# Patient Record
Sex: FEMALE | Race: WHITE | NOT HISPANIC OR LATINO | Employment: OTHER | URBAN - METROPOLITAN AREA
[De-identification: names, ages, dates, MRNs, and addresses within clinical notes are randomized per-mention and may not be internally consistent; named-entity substitution may affect disease eponyms.]

---

## 2023-11-01 ENCOUNTER — OFFICE VISIT (OUTPATIENT)
Dept: PRIMARY CARE | Facility: CLINIC | Age: 68
End: 2023-11-01
Payer: COMMERCIAL

## 2023-11-01 VITALS
WEIGHT: 215.39 LBS | OXYGEN SATURATION: 94 % | RESPIRATION RATE: 16 BRPM | SYSTOLIC BLOOD PRESSURE: 130 MMHG | DIASTOLIC BLOOD PRESSURE: 80 MMHG | BODY MASS INDEX: 35.89 KG/M2 | HEIGHT: 65 IN | HEART RATE: 79 BPM

## 2023-11-01 DIAGNOSIS — R26.9 GAIT ABNORMALITY: Primary | ICD-10-CM

## 2023-11-01 DIAGNOSIS — F41.9 ANXIETY: ICD-10-CM

## 2023-11-01 DIAGNOSIS — M16.10 HIP ARTHRITIS: ICD-10-CM

## 2023-11-01 DIAGNOSIS — B19.20 HEPATITIS C VIRUS INFECTION WITHOUT HEPATIC COMA, UNSPECIFIED CHRONICITY: ICD-10-CM

## 2023-11-01 PROBLEM — Z96.652 HISTORY OF TOTAL LEFT KNEE REPLACEMENT: Status: ACTIVE | Noted: 2023-11-01

## 2023-11-01 PROBLEM — F41.0 PANIC DISORDER: Status: ACTIVE | Noted: 2023-11-01

## 2023-11-01 PROBLEM — A69.20 LYME DISEASE: Status: ACTIVE | Noted: 2023-11-01

## 2023-11-01 PROBLEM — Z96.643 HISTORY OF TOTAL REPLACEMENT OF BOTH HIP JOINTS: Status: ACTIVE | Noted: 2023-11-01

## 2023-11-01 PROBLEM — M53.86 SCIATICA ASSOCIATED WITH DISORDER OF LUMBAR SPINE: Status: ACTIVE | Noted: 2023-11-01

## 2023-11-01 PROBLEM — Z86.74 HISTORY OF CARDIAC ARREST: Status: ACTIVE | Noted: 2023-11-01

## 2023-11-01 PROBLEM — B18.2 CHRONIC HEPATITIS C WITHOUT HEPATIC COMA (MULTI): Status: ACTIVE | Noted: 2023-11-01

## 2023-11-01 PROBLEM — M54.16 LUMBAR RADICULOPATHY, CHRONIC: Status: ACTIVE | Noted: 2023-11-01

## 2023-11-01 PROBLEM — E89.0 POSTOPERATIVE HYPOTHYROIDISM: Status: ACTIVE | Noted: 2023-11-01

## 2023-11-01 PROBLEM — M48.061 LUMBAR STENOSIS: Status: ACTIVE | Noted: 2023-11-01

## 2023-11-01 PROBLEM — I10 PRIMARY HYPERTENSION: Status: ACTIVE | Noted: 2023-11-01

## 2023-11-01 PROBLEM — Z86.2 HISTORY OF THROMBOCYTOPENIA: Status: ACTIVE | Noted: 2023-11-01

## 2023-11-01 PROBLEM — Z96.649 HISTORY OF REVISION OF TOTAL HIP ARTHROPLASTY: Status: ACTIVE | Noted: 2023-11-01

## 2023-11-01 PROCEDURE — 3075F SYST BP GE 130 - 139MM HG: CPT | Performed by: INTERNAL MEDICINE

## 2023-11-01 PROCEDURE — 99204 OFFICE O/P NEW MOD 45 MIN: CPT | Performed by: INTERNAL MEDICINE

## 2023-11-01 PROCEDURE — 3079F DIAST BP 80-89 MM HG: CPT | Performed by: INTERNAL MEDICINE

## 2023-11-01 PROCEDURE — 1159F MED LIST DOCD IN RCRD: CPT | Performed by: INTERNAL MEDICINE

## 2023-11-01 RX ORDER — FLUOCINONIDE 0.5 MG/G
CREAM TOPICAL 2 TIMES DAILY
COMMUNITY
Start: 2023-01-20 | End: 2023-11-01 | Stop reason: ALTCHOICE

## 2023-11-01 RX ORDER — GABAPENTIN 400 MG/1
800 CAPSULE ORAL 3 TIMES DAILY
COMMUNITY
Start: 2023-10-24

## 2023-11-01 RX ORDER — ASPIRIN 81 MG/1
81 TABLET ORAL 2 TIMES DAILY
COMMUNITY
Start: 2020-12-15

## 2023-11-01 RX ORDER — CLINDAMYCIN PHOSPHATE 11.9 MG/ML
SOLUTION TOPICAL
COMMUNITY
Start: 2016-02-03 | End: 2023-11-01 | Stop reason: ALTCHOICE

## 2023-11-01 RX ORDER — ALPRAZOLAM 0.5 MG/1
0.5 TABLET ORAL 2 TIMES DAILY PRN
COMMUNITY
Start: 2023-10-24

## 2023-11-01 RX ORDER — LEVOTHYROXINE SODIUM 137 UG/1
137 TABLET ORAL DAILY
COMMUNITY
Start: 2023-08-11

## 2023-11-01 RX ORDER — ERGOCALCIFEROL 1.25 MG/1
100000 CAPSULE ORAL WEEKLY
COMMUNITY
Start: 2020-12-15

## 2023-11-01 RX ORDER — LISINOPRIL AND HYDROCHLOROTHIAZIDE 12.5; 2 MG/1; MG/1
1 TABLET ORAL DAILY
COMMUNITY
Start: 2023-06-20

## 2023-11-01 RX ORDER — MUPIROCIN 20 MG/G
OINTMENT TOPICAL
COMMUNITY
Start: 2023-01-05 | End: 2023-11-01 | Stop reason: ALTCHOICE

## 2023-11-01 ASSESSMENT — ENCOUNTER SYMPTOMS
LOSS OF SENSATION IN FEET: 0
OCCASIONAL FEELINGS OF UNSTEADINESS: 1
DEPRESSION: 0

## 2023-11-01 NOTE — PROGRESS NOTES
Subjective   Patient ID: Rashmi Oreilly is a 68 y.o. female who presents to [facility]. She is switching care from [facility]  She is mostly here because she was told that [facility] has very good physical therapy.  She has a longstanding relationship with her PCP at [facility].    Chief complaint: Patient had a L hip arthroplasty in 2019 with complicated recovery and subsequent revision and L TKA , and has not had full function of the leg since. She would like a referral for physical therapy.     Since the surgery, she experiences significant pain of the L leg and foot with cramping of L foot. She manages her pain with ibuprofen, 600-800 mg at a time TID. Ibuprofen does help with the pain, but she is concerned long-term effects on her liver.     Mental health: Patient has a history of panic disorder as diagnosed by a psychiatrist in the past. She manages her panic with alprazolam, which she says has significantly improved her quality of life by limiting the number of panic attacks that she experiences. Current frequency of panic attacks is every 6 weeks or so, and they generally occur at night, awakening her from sleep.         Also with reported history of hepatitis C, she says she has never received treatment for this.  She is not very interested, says she has talked to her [employer] group and she believes that unless she has actual symptoms does any treatment.      PMH  - Panic disorder, with frequent panic attacks    - Cardiac arrest with ventricular fibrillation 2/2 AICD implantation (2008); [facility] clinical summary note generated 11/1/23 notes AICD may no longer be functional due to fractured lead   - Chronic HCV  - Hypertension - patient does not check her BP at home  - Hypothyroidism 2/2 thyroidectomy for malignant neoplasm of thyroid  - Lyme Disease, 1995 (treated)  - Sciatica 2/2 spondylosis, occurs in either leg     PSH  Bilateral hip arthroplasty  Total knee arthroplasty (left)    Review of Systems  No fevers no chills,  "no weight changes.    No URI symptoms    No cough no shortness of breath    No chest pain no palpitations    Appetite intact, no abdominal pain.    No new or unusual headaches.        Objective   /80   Pulse 79   Resp 16   Ht 1.651 m (5' 5\")   Wt 97.7 kg (215 lb 6.2 oz)   SpO2 94%   BMI 35.84 kg/m²     Physical Exam  Coherent and appropriate.    Neck is supple and none tender    Breathing comfortably, clear to auscultation bilaterally    Regular rate rhythm, no murmur gallops or rubs.  Good distal pulses.  No edema.  No JVD.    Abdomen soft and nontender, normal bowel sounds.    Extremities warm well perfused with no clubbing or cyanosis    Cognition intact.      Assessment/Plan   Rashmi Oreilly is a 68-yo woman with PMH of HTN, hypothyroidism s/p thyroidectomy for PTC, anxiety, chronic HCV (not treated), cardiac arrest 2/2 ventricular fibrillation s/p AICD implantation (2008) here to establish primary care Her chief concern at this time is getting a referral to physical therapy in hopes of improving use of her left leg s/p lasting damage from SANDI and revision.     Chronic hip pain: Referral to physical therapy.    Chronic hepatitis C: Does not appear to be treated, she is very hesitant, strongly encouraged her to follow-up with her primary PCP at [facility] to get tested, I do not see any viral load in the past.  Based on exam no concern for cirrhosis.  She also had recent ultrasound of liver that was essentially normal.    Chronic anxiety: Had a long discussion with patient, explained to her that alprazolam is not really treatment of choice for chronic anxiety, especially with history of alcohol abuse, patient understands but says that this is the only that works for her, I have encouraged her to follow-up with her regular PCP at [facility], patient understands that I will not be giving her alprazolam.    Patient will resume her care at [facility].  "

## 2023-11-30 ENCOUNTER — APPOINTMENT (OUTPATIENT)
Dept: PHYSICAL THERAPY | Facility: CLINIC | Age: 68
End: 2023-11-30
Payer: COMMERCIAL

## 2023-11-30 NOTE — PROGRESS NOTES
Physical Therapy  Physical Therapy Orthopedic Evaluation    Patient Name: Rashmi Oreilly  MRN: [id number]  Today's Date: 11/30/2023       Insurance:  Visit number: *** of ***  Authorization info: ***  Insurance Type: Payor: [facility] / Plan: [facility] / Product Type: *No Product type* /      Current Problem  No diagnosis found.    Referred by: ***    General:       Precautions:     Medical History Form: Reviewed (scanned into chart)    Subjective:   CLAUDINE: Pt reports to evaluation due to chronic R/L hip pain.     Previous Med Management:    PMH:    Aggravating Factors:    Relieving Factors:    Pain/Symptom Scale:  Current:  Worst:  Best:  Location/Nature:  Relieving/ Aggravating/ Meds:    PLOF:  ADL:  Work:   Athletics:   Recreation/ Hobbies:    Red Flags:    Goals:     Language: English      Red Flags: Do you have any of the following? {Yes/No:73265}  Fever/chills, unexplained weight changes, dizziness/fainting, unexplained change in bowel or bladder functions, unexplained malaise or muscle weakness, night pain/sweats, numbness or tingling    Objective:      Outcome Measures:  {PT Outcome Measures:69508}     Treatments:      EDUCATION: Home exercise program, plan of care, activity modifications, pain management, and injury pathology       Assessment:     Patient is a [__] year old male/female that presents to physical therapy evaluation today due to complaints of []. Patient impairments include flexibility deficits of [], strength deficits in [] musculature, and motor control deficits including []. Due to these impairments, the patients has the following functional limitations: pain with [], difficulty [] etc. Skilled therapy recommended in order to to address their impairments and progress towards the associated functional goals. Prognosis is excellent/good/fair secondary to their current presentation and client understanding. The pt demonstrates a  good understanding of their current plan of care, rehab expectations, and prognosis.       Plan:     Planned Interventions include: therapeutic exercise, self-care home management, manual therapy, therapeutic activities, gait training, neuromuscular coordination, vasopneumatic, dry needling, aquatic therapy  Frequency: {Frequency:87134}  Duration: {Duration:08902}  Goals: Set and discussed today      Plan of care was developed with input and agreement by the patient      Zachery Ivey PT

## 2023-12-07 ENCOUNTER — EVALUATION (OUTPATIENT)
Dept: PHYSICAL THERAPY | Facility: CLINIC | Age: 68
End: 2023-12-07
Payer: COMMERCIAL

## 2023-12-07 DIAGNOSIS — M25.552 CHRONIC LEFT HIP PAIN: Primary | ICD-10-CM

## 2023-12-07 DIAGNOSIS — M16.10 HIP ARTHRITIS: ICD-10-CM

## 2023-12-07 DIAGNOSIS — R26.2 DIFFICULTY WALKING: ICD-10-CM

## 2023-12-07 DIAGNOSIS — G89.29 CHRONIC LEFT HIP PAIN: Primary | ICD-10-CM

## 2023-12-07 DIAGNOSIS — R29.898 WEAKNESS OF BOTH HIPS: ICD-10-CM

## 2023-12-07 PROCEDURE — 97162 PT EVAL MOD COMPLEX 30 MIN: CPT | Mod: GP

## 2023-12-07 PROCEDURE — 97110 THERAPEUTIC EXERCISES: CPT | Mod: GP

## 2023-12-07 NOTE — PROGRESS NOTES
Physical Therapy  Physical Therapy Orthopedic Evaluation    Patient Name: Rashmi Oreilly  MRN: [id number]  Today's Date: 12/12/2023       Insurance:  Visit number: 1 of [place]  Authorization info: No auth   Insurance Type: Payor: [facility] / Plan: [facility] / Product Type: *No Product type* /      Current Problem  1. Chronic left hip pain        2. Hip arthritis  Referral to Physical Therapy      3. Difficulty walking        4. Weakness of both hips            Referred by: Dr. Stewart    General:  General  Reason for Referral: L hip pain  Referred By: Dr. Nadir Stewart    Precautions:  Precautions  Precautions Comment: Panic disorder, Neuropathy, HBP, Hep C, Osteoporosis.  Medical History Form: Reviewed (scanned into chart)    Subjective:   CLAUDINE: Pt reports to evaluation due to L hip pain. Pt currently ambulating with step to gait pattern with walker assistance. Pt had her L hip replaced in 2018. She also had her R hip and L knee replaced. Pt felt like the surgeon who did her L hip did not measure correctly because her L leg is longer than her R. Pt has tried to put a heel lift in her R shoe before but does not feel like it has helped. Pt has to walk with a walker now and has gained 40lbs because of it. Pt also has some sciatica down the L leg and low back. Pt also has neuropathy in her feet.     Previous Med Management: Nothing besides her replacement    PMH: None relevant to the L hip. Hx of sciatica    Aggravating Factors: Walking, sitting for a long, rolling in bed. (everything)     Relieving Factors: Nothing really    Pain/Symptom Scale:  Current: 6/10  Worst: 9/10  Best: 5/10  Location/Nature: All around the hip and down the L leg. Describes as a bad tooth ache   Meds: Gabapentin, Advil     PLOF: Prior to 2018 pt was able to get around a lot better but always had pain  ADL: Pt needs to take her time with ADLs but is independent   Work: Retired teacher    Recreation/ Hobbies: None     Goals: Pt would like to try and walk better and decrease her pain.     Language: English      Red Flags: Do you have any of the following? No  Fever/chills, unexplained weight changes, dizziness/fainting, unexplained change in bowel or bladder functions, unexplained malaise or muscle weakness, night pain/sweats, numbness or tingling    Objective:  Palpation/ Observation   Pt's L hip higher than R in standing but not sitting. Increased tenderness and tightness over lateral aspect of L hip. R leg 1 inch shorter than L.     LE MMT  Hip flexion- R: 4/5 L: 4/5  Hip abduction - R: 4+/5 L:4/5  Hip adduction- R: 4+/5 L: 4+/5  Hip extension- R: 4/5 L: 4/5  Knee extension- R: 4/5 L: 4/5  Knee flexion- R: 4/5 L: 4/5    Gait assessment  Pt walked with lateral lean to to R, step to gait pattern, and assistance from walker. Pt only able to walk unassisted for very short distances.     Outcome Measures:  Other Measures  Lower Extremity Funtional Score (LEFS): 18/80     Treatments:  Access Code: [email or web address]  URL: [email or web address]  Date: 12/12/2023  Prepared by: Zachery Ivey    Exercises  - Supine Posterior Pelvic Tilt  - 1 x daily - 7 x weekly - 3 sets - 10 reps  - Supine Hip Adduction Isometric with Ball  - 1 x daily - 7 x weekly - 3 sets - 10 reps  - Hooklying Isometric Hip Abduction with Belt  - 1 x daily - 7 x weekly - 3 sets - 10 reps    EDUCATION: Home exercise program, plan of care, activity modifications, pain management, and injury pathology       Assessment:     Patient is a 68 year old female that presents to physical therapy evaluation today due to complaints of L hip pain. Patient impairments include flexibility deficits of L hip, strength deficits in hip musculature, and motor control deficits including lack of LE control. Due to these impairments, the patients has the following functional limitations: pain with walking, difficulty standing for long periods of  time, decreased ability to perform ADLs, and an overall decrease in functional mobility. Skilled therapy recommended in order to to address their impairments and progress towards the associated functional goals. Prognosis is fair secondary to their current presentation and client understanding. The pt demonstrates a good understanding of their current plan of care, rehab expectations, and prognosis.          Plan:     Planned Interventions include: therapeutic exercise, self-care home management, manual therapy, therapeutic activities, gait training, neuromuscular coordination, vasopneumatic, dry needling, aquatic therapy  Frequency: 1 x Week  Duration: 8 Weeks  Goals: Set and discussed today  Active       PT Problem       PT Goals       Start:  12/12/23       1. Pt will increase LEFS with 50/80 or better in order to demo an increase in L knee function  2. Pt will demo 4+/5 or all hip/knee MMT in order to demo an increase in LE strength   3.Pt will be able to ascend and descend 10 steps with reciprocal gait pattern and proper knee control in order to demo and increase in functional mobility   4. Pt will demo compliance and independence with HEP   5.  Pt will be able to walk for 10 mins with step through gait pattern and non antalgic gait pattern with no AD               Plan of care was developed with input and agreement by the patient      Zachery Ivey PT

## 2023-12-12 PROBLEM — R29.898 WEAKNESS OF BOTH HIPS: Status: ACTIVE | Noted: 2023-12-12

## 2023-12-12 PROBLEM — R26.2 DIFFICULTY WALKING: Status: ACTIVE | Noted: 2023-12-12

## 2023-12-12 PROBLEM — G89.29 CHRONIC LEFT HIP PAIN: Status: ACTIVE | Noted: 2023-12-12

## 2023-12-12 PROBLEM — M25.552 CHRONIC LEFT HIP PAIN: Status: ACTIVE | Noted: 2023-12-12

## 2023-12-14 ENCOUNTER — APPOINTMENT (OUTPATIENT)
Dept: PHYSICAL THERAPY | Facility: CLINIC | Age: 68
End: 2023-12-14
Payer: COMMERCIAL

## 2023-12-22 ENCOUNTER — APPOINTMENT (OUTPATIENT)
Dept: PHYSICAL THERAPY | Facility: CLINIC | Age: 68
End: 2023-12-22
Payer: COMMERCIAL

## 2025-02-10 ENCOUNTER — APPOINTMENT (OUTPATIENT)
Dept: RADIOLOGY | Facility: HOSPITAL | Age: 70
DRG: 917 | End: 2025-02-10
Payer: MEDICARE

## 2025-02-10 ENCOUNTER — HOSPITAL ENCOUNTER (INPATIENT)
Facility: HOSPITAL | Age: 70
DRG: 917 | End: 2025-02-10
Attending: EMERGENCY MEDICINE | Admitting: INTERNAL MEDICINE
Payer: MEDICARE

## 2025-02-10 ENCOUNTER — APPOINTMENT (OUTPATIENT)
Dept: CARDIOLOGY | Facility: HOSPITAL | Age: 70
DRG: 917 | End: 2025-02-10
Payer: MEDICARE

## 2025-02-10 DIAGNOSIS — J96.00 ACUTE RESPIRATORY FAILURE, UNSPECIFIED WHETHER WITH HYPOXIA OR HYPERCAPNIA: ICD-10-CM

## 2025-02-10 DIAGNOSIS — T50.904D: ICD-10-CM

## 2025-02-10 DIAGNOSIS — Z95.810 S/P ICD (INTERNAL CARDIAC DEFIBRILLATOR) PROCEDURE: ICD-10-CM

## 2025-02-10 DIAGNOSIS — I47.29 OTHER VENTRICULAR TACHYCARDIA: ICD-10-CM

## 2025-02-10 DIAGNOSIS — T50.902A INTENTIONAL DRUG OVERDOSE, INITIAL ENCOUNTER (MULTI): Primary | ICD-10-CM

## 2025-02-10 DIAGNOSIS — I46.9 CARDIAC ARREST: ICD-10-CM

## 2025-02-10 DIAGNOSIS — M79.89 LEG SWELLING: ICD-10-CM

## 2025-02-10 DIAGNOSIS — I27.20 PULMONARY HYPERTENSION (MULTI): ICD-10-CM

## 2025-02-10 DIAGNOSIS — Z95.810 ICD (IMPLANTABLE CARDIOVERTER-DEFIBRILLATOR) IN PLACE: ICD-10-CM

## 2025-02-10 DIAGNOSIS — I50.31 ACUTE DIASTOLIC (CONGESTIVE) HEART FAILURE: ICD-10-CM

## 2025-02-10 DIAGNOSIS — L70.0 ACNE VULGARIS: ICD-10-CM

## 2025-02-10 DIAGNOSIS — R60.0 EDEMA OF LEFT UPPER EXTREMITY: ICD-10-CM

## 2025-02-10 DIAGNOSIS — I07.1 SEVERE TRICUSPID REGURGITATION BY PRIOR ECHOCARDIOGRAM: ICD-10-CM

## 2025-02-10 PROBLEM — M87.052 IDIOPATHIC ASEPTIC NECROSIS OF LEFT FEMUR (MULTI): Status: ACTIVE | Noted: 2018-12-05

## 2025-02-10 PROBLEM — A69.20 LYME DISEASE, UNSPECIFIED: Status: ACTIVE | Noted: 2018-12-05

## 2025-02-10 PROBLEM — G92.9 TOXIC ENCEPHALOPATHY: Status: ACTIVE | Noted: 2025-02-10

## 2025-02-10 PROBLEM — K59.00 CONSTIPATION, UNSPECIFIED: Status: ACTIVE | Noted: 2018-12-05

## 2025-02-10 PROBLEM — N18.30 STAGE 3 CHRONIC KIDNEY DISEASE (MULTI): Status: ACTIVE | Noted: 2018-12-05

## 2025-02-10 PROBLEM — F32.9 MAJOR DEPRESSIVE DISORDER, SINGLE EPISODE, UNSPECIFIED: Status: ACTIVE | Noted: 2018-12-05

## 2025-02-10 PROBLEM — Z96.642 STATUS POST TOTAL REPLACEMENT OF LEFT HIP: Status: ACTIVE | Noted: 2018-11-29

## 2025-02-10 PROBLEM — E04.1 NONTOXIC SINGLE THYROID NODULE: Status: ACTIVE | Noted: 2018-12-05

## 2025-02-10 PROBLEM — T50.901A POLYSUBSTANCE OVERDOSE: Status: ACTIVE | Noted: 2025-02-10

## 2025-02-10 PROBLEM — F17.210 NICOTINE DEPENDENCE, CIGARETTES, UNCOMPLICATED: Status: ACTIVE | Noted: 2018-12-05

## 2025-02-10 PROBLEM — M62.50 MUSCLE WASTING AND ATROPHY, NOT ELSEWHERE CLASSIFIED, UNSPECIFIED SITE: Status: ACTIVE | Noted: 2018-12-05

## 2025-02-10 PROBLEM — D61.818 OTHER PANCYTOPENIA (MULTI): Status: ACTIVE | Noted: 2018-12-05

## 2025-02-10 PROBLEM — J98.8 AIRWAY COMPROMISE: Status: ACTIVE | Noted: 2025-02-10

## 2025-02-10 PROBLEM — I48.0 PAROXYSMAL ATRIAL FIBRILLATION (MULTI): Status: ACTIVE | Noted: 2023-09-13

## 2025-02-10 LAB
ALBUMIN SERPL BCP-MCNC: 3.2 G/DL (ref 3.4–5)
ALP SERPL-CCNC: 114 U/L (ref 33–136)
ALT SERPL W P-5'-P-CCNC: 37 U/L (ref 7–45)
AMMONIA PLAS-SCNC: 47 UMOL/L (ref 16–53)
AMPHETAMINES UR QL SCN: ABNORMAL
ANION GAP BLDA CALCULATED.4IONS-SCNC: 12 MMO/L (ref 10–25)
ANION GAP BLDA CALCULATED.4IONS-SCNC: 12 MMO/L (ref 10–25)
ANION GAP BLDV CALCULATED.4IONS-SCNC: 11 MMOL/L (ref 10–25)
ANION GAP SERPL CALC-SCNC: 11 MMOL/L (ref 10–20)
ANION GAP SERPL CALC-SCNC: 13 MMOL/L (ref 10–20)
APAP SERPL-MCNC: <10 UG/ML
APPEARANCE UR: CLEAR
AST SERPL W P-5'-P-CCNC: 51 U/L (ref 9–39)
BARBITURATES UR QL SCN: ABNORMAL
BASE EXCESS BLDA CALC-SCNC: -0.8 MMOL/L (ref -2–3)
BASE EXCESS BLDA CALC-SCNC: 0.6 MMOL/L (ref -2–3)
BASE EXCESS BLDV CALC-SCNC: 1.8 MMOL/L (ref -2–3)
BASOPHILS # BLD AUTO: 0.04 X10*3/UL (ref 0–0.1)
BASOPHILS # BLD AUTO: 0.08 X10*3/UL (ref 0–0.1)
BASOPHILS NFR BLD AUTO: 0.8 %
BASOPHILS NFR BLD AUTO: 1 %
BENZODIAZ UR QL SCN: ABNORMAL
BILIRUB DIRECT SERPL-MCNC: 0.4 MG/DL (ref 0–0.3)
BILIRUB SERPL-MCNC: 1.2 MG/DL (ref 0–1.2)
BILIRUB UR STRIP.AUTO-MCNC: NEGATIVE MG/DL
BNP SERPL-MCNC: 111 PG/ML (ref 0–99)
BODY TEMPERATURE: 37 DEGREES CELSIUS
BUN SERPL-MCNC: 26 MG/DL (ref 6–23)
BUN SERPL-MCNC: 26 MG/DL (ref 6–23)
BZE UR QL SCN: ABNORMAL
CA-I BLDA-SCNC: 1.2 MMOL/L (ref 1.1–1.33)
CA-I BLDA-SCNC: 1.31 MMOL/L (ref 1.1–1.33)
CA-I BLDV-SCNC: 1.3 MMOL/L (ref 1.1–1.33)
CALCIUM SERPL-MCNC: 9.3 MG/DL (ref 8.6–10.3)
CALCIUM SERPL-MCNC: 9.4 MG/DL (ref 8.6–10.3)
CANNABINOIDS UR QL SCN: ABNORMAL
CARDIAC TROPONIN I PNL SERPL HS: 4 NG/L (ref 0–13)
CARDIAC TROPONIN I PNL SERPL HS: 4 NG/L (ref 0–13)
CHLORIDE BLDA-SCNC: 109 MMOL/L (ref 98–107)
CHLORIDE BLDA-SCNC: 110 MMOL/L (ref 98–107)
CHLORIDE BLDV-SCNC: 109 MMOL/L (ref 98–107)
CHLORIDE SERPL-SCNC: 108 MMOL/L (ref 98–107)
CHLORIDE SERPL-SCNC: 111 MMOL/L (ref 98–107)
CO2 SERPL-SCNC: 23 MMOL/L (ref 21–32)
CO2 SERPL-SCNC: 28 MMOL/L (ref 21–32)
COLOR UR: ABNORMAL
CREAT SERPL-MCNC: 1.33 MG/DL (ref 0.5–1.05)
CREAT SERPL-MCNC: 1.38 MG/DL (ref 0.5–1.05)
EGFRCR SERPLBLD CKD-EPI 2021: 42 ML/MIN/1.73M*2
EGFRCR SERPLBLD CKD-EPI 2021: 44 ML/MIN/1.73M*2
EOSINOPHIL # BLD AUTO: 0.09 X10*3/UL (ref 0–0.7)
EOSINOPHIL # BLD AUTO: 0.1 X10*3/UL (ref 0–0.7)
EOSINOPHIL NFR BLD AUTO: 1 %
EOSINOPHIL NFR BLD AUTO: 2.3 %
ERYTHROCYTE [DISTWIDTH] IN BLOOD BY AUTOMATED COUNT: 14.4 % (ref 11.5–14.5)
ERYTHROCYTE [DISTWIDTH] IN BLOOD BY AUTOMATED COUNT: 14.5 % (ref 11.5–14.5)
ETHANOL SERPL-MCNC: <10 MG/DL
FENTANYL+NORFENTANYL UR QL SCN: ABNORMAL
FLUAV RNA RESP QL NAA+PROBE: NOT DETECTED
FLUBV RNA RESP QL NAA+PROBE: NOT DETECTED
GLUCOSE BLD MANUAL STRIP-MCNC: 156 MG/DL (ref 74–99)
GLUCOSE BLD MANUAL STRIP-MCNC: 157 MG/DL (ref 74–99)
GLUCOSE BLDA-MCNC: 134 MG/DL (ref 74–99)
GLUCOSE BLDA-MCNC: 159 MG/DL (ref 74–99)
GLUCOSE BLDV-MCNC: 121 MG/DL (ref 74–99)
GLUCOSE SERPL-MCNC: 118 MG/DL (ref 74–99)
GLUCOSE SERPL-MCNC: 146 MG/DL (ref 74–99)
GLUCOSE UR STRIP.AUTO-MCNC: NORMAL MG/DL
HCO3 BLDA-SCNC: 23.5 MMOL/L (ref 22–26)
HCO3 BLDA-SCNC: 24.5 MMOL/L (ref 22–26)
HCO3 BLDV-SCNC: 25.7 MMOL/L (ref 22–26)
HCT VFR BLD AUTO: 27.9 % (ref 36–46)
HCT VFR BLD AUTO: 37.2 % (ref 36–46)
HCT VFR BLD EST: 38 % (ref 36–46)
HCT VFR BLD EST: 41 % (ref 36–46)
HCT VFR BLD EST: 41 % (ref 36–46)
HGB BLD-MCNC: 12.8 G/DL (ref 12–16)
HGB BLD-MCNC: 9 G/DL (ref 12–16)
HGB BLDA-MCNC: 13.7 G/DL (ref 12–16)
HGB BLDA-MCNC: 13.8 G/DL (ref 12–16)
HGB BLDV-MCNC: 12.8 G/DL (ref 12–16)
HOLD SPECIMEN: NORMAL
IMM GRANULOCYTES # BLD AUTO: 0.01 X10*3/UL (ref 0–0.7)
IMM GRANULOCYTES # BLD AUTO: 0.06 X10*3/UL (ref 0–0.7)
IMM GRANULOCYTES NFR BLD AUTO: 0.3 % (ref 0–0.9)
IMM GRANULOCYTES NFR BLD AUTO: 0.6 % (ref 0–0.9)
INHALED O2 CONCENTRATION: 100 %
INHALED O2 CONCENTRATION: 100 %
INHALED O2 CONCENTRATION: 36 %
INR PPP: 1.4 (ref 0.9–1.1)
KETONES UR STRIP.AUTO-MCNC: NEGATIVE MG/DL
LACTATE BLDA-SCNC: 1.3 MMOL/L (ref 0.4–2)
LACTATE BLDA-SCNC: 3.3 MMOL/L (ref 0.4–2)
LACTATE BLDV-SCNC: 1.3 MMOL/L (ref 0.4–2)
LACTATE SERPL-SCNC: 1.1 MMOL/L (ref 0.4–2)
LEUKOCYTE ESTERASE UR QL STRIP.AUTO: ABNORMAL
LYMPHOCYTES # BLD AUTO: 1.55 X10*3/UL (ref 1.2–4.8)
LYMPHOCYTES # BLD AUTO: 5.13 X10*3/UL (ref 1.2–4.8)
LYMPHOCYTES NFR BLD AUTO: 39.4 %
LYMPHOCYTES NFR BLD AUTO: 52.6 %
MAGNESIUM SERPL-MCNC: 1.48 MG/DL (ref 1.6–2.4)
MAGNESIUM SERPL-MCNC: 1.54 MG/DL (ref 1.6–2.4)
MCH RBC QN AUTO: 33.3 PG (ref 26–34)
MCH RBC QN AUTO: 33.5 PG (ref 26–34)
MCHC RBC AUTO-ENTMCNC: 32.3 G/DL (ref 32–36)
MCHC RBC AUTO-ENTMCNC: 34.4 G/DL (ref 32–36)
MCV RBC AUTO: 104 FL (ref 80–100)
MCV RBC AUTO: 97 FL (ref 80–100)
METHADONE UR QL SCN: ABNORMAL
MONOCYTES # BLD AUTO: 0.35 X10*3/UL (ref 0.1–1)
MONOCYTES # BLD AUTO: 0.66 X10*3/UL (ref 0.1–1)
MONOCYTES NFR BLD AUTO: 6.8 %
MONOCYTES NFR BLD AUTO: 8.9 %
MUCOUS THREADS #/AREA URNS AUTO: ABNORMAL /LPF
NEUTROPHILS # BLD AUTO: 1.89 X10*3/UL (ref 1.2–7.7)
NEUTROPHILS # BLD AUTO: 3.73 X10*3/UL (ref 1.2–7.7)
NEUTROPHILS NFR BLD AUTO: 38.2 %
NEUTROPHILS NFR BLD AUTO: 48.1 %
NITRITE UR QL STRIP.AUTO: NEGATIVE
NRBC BLD-RTO: 0 /100 WBCS (ref 0–0)
NRBC BLD-RTO: 0 /100 WBCS (ref 0–0)
OPIATES UR QL SCN: ABNORMAL
OXYCODONE+OXYMORPHONE UR QL SCN: ABNORMAL
OXYHGB MFR BLDA: 93 % (ref 94–98)
OXYHGB MFR BLDA: 96.4 % (ref 94–98)
OXYHGB MFR BLDV: 65.3 % (ref 45–75)
PCO2 BLDA: 36 MM HG (ref 38–42)
PCO2 BLDA: 37 MM HG (ref 38–42)
PCO2 BLDV: 37 MM HG (ref 41–51)
PCP UR QL SCN: ABNORMAL
PH BLDA: 7.41 PH (ref 7.38–7.42)
PH BLDA: 7.44 PH (ref 7.38–7.42)
PH BLDV: 7.45 PH (ref 7.33–7.43)
PH UR STRIP.AUTO: 5.5 [PH]
PLATELET # BLD AUTO: 51 X10*3/UL (ref 150–450)
PLATELET # BLD AUTO: 77 X10*3/UL (ref 150–450)
PO2 BLDA: 130 MM HG (ref 85–95)
PO2 BLDA: 82 MM HG (ref 85–95)
PO2 BLDV: 39 MM HG (ref 35–45)
POTASSIUM BLDA-SCNC: 3.1 MMOL/L (ref 3.5–5.3)
POTASSIUM BLDA-SCNC: 4.8 MMOL/L (ref 3.5–5.3)
POTASSIUM BLDV-SCNC: 4.5 MMOL/L (ref 3.5–5.3)
POTASSIUM SERPL-SCNC: 3.1 MMOL/L (ref 3.5–5.3)
POTASSIUM SERPL-SCNC: 4.2 MMOL/L (ref 3.5–5.3)
PROT SERPL-MCNC: 6.8 G/DL (ref 6.4–8.2)
PROT UR STRIP.AUTO-MCNC: NEGATIVE MG/DL
PROTHROMBIN TIME: 15.9 SECONDS (ref 9.8–12.8)
RBC # BLD AUTO: 2.69 X10*6/UL (ref 4–5.2)
RBC # BLD AUTO: 3.84 X10*6/UL (ref 4–5.2)
RBC # UR STRIP.AUTO: NEGATIVE MG/DL
RBC #/AREA URNS AUTO: ABNORMAL /HPF
RSV RNA RESP QL NAA+PROBE: NOT DETECTED
SALICYLATES SERPL-MCNC: <3 MG/DL
SAO2 % BLDA: 97 % (ref 94–100)
SAO2 % BLDA: 99 % (ref 94–100)
SAO2 % BLDV: 69 % (ref 45–75)
SARS-COV-2 RNA RESP QL NAA+PROBE: NOT DETECTED
SODIUM BLDA-SCNC: 140 MMOL/L (ref 136–145)
SODIUM BLDA-SCNC: 143 MMOL/L (ref 136–145)
SODIUM BLDV-SCNC: 141 MMOL/L (ref 136–145)
SODIUM SERPL-SCNC: 141 MMOL/L (ref 136–145)
SODIUM SERPL-SCNC: 146 MMOL/L (ref 136–145)
SP GR UR STRIP.AUTO: 1.01
SQUAMOUS #/AREA URNS AUTO: ABNORMAL /HPF
TSH SERPL-ACNC: 1.16 MIU/L (ref 0.44–3.98)
UROBILINOGEN UR STRIP.AUTO-MCNC: NORMAL MG/DL
WBC # BLD AUTO: 3.9 X10*3/UL (ref 4.4–11.3)
WBC # BLD AUTO: 9.8 X10*3/UL (ref 4.4–11.3)
WBC #/AREA URNS AUTO: ABNORMAL /HPF

## 2025-02-10 PROCEDURE — 84132 ASSAY OF SERUM POTASSIUM: CPT | Performed by: EMERGENCY MEDICINE

## 2025-02-10 PROCEDURE — 80143 DRUG ASSAY ACETAMINOPHEN: CPT | Performed by: EMERGENCY MEDICINE

## 2025-02-10 PROCEDURE — 2500000005 HC RX 250 GENERAL PHARMACY W/O HCPCS: Performed by: INTERNAL MEDICINE

## 2025-02-10 PROCEDURE — 74018 RADEX ABDOMEN 1 VIEW: CPT | Mod: FOREIGN READ | Performed by: RADIOLOGY

## 2025-02-10 PROCEDURE — 84132 ASSAY OF SERUM POTASSIUM: CPT | Performed by: NURSE PRACTITIONER

## 2025-02-10 PROCEDURE — 74018 RADEX ABDOMEN 1 VIEW: CPT

## 2025-02-10 PROCEDURE — 87086 URINE CULTURE/COLONY COUNT: CPT | Mod: AHULAB | Performed by: EMERGENCY MEDICINE

## 2025-02-10 PROCEDURE — 36415 COLL VENOUS BLD VENIPUNCTURE: CPT | Performed by: EMERGENCY MEDICINE

## 2025-02-10 PROCEDURE — 84443 ASSAY THYROID STIM HORMONE: CPT | Performed by: EMERGENCY MEDICINE

## 2025-02-10 PROCEDURE — 96374 THER/PROPH/DIAG INJ IV PUSH: CPT | Mod: 59

## 2025-02-10 PROCEDURE — 70450 CT HEAD/BRAIN W/O DYE: CPT | Performed by: RADIOLOGY

## 2025-02-10 PROCEDURE — 2500000004 HC RX 250 GENERAL PHARMACY W/ HCPCS (ALT 636 FOR OP/ED): Performed by: EMERGENCY MEDICINE

## 2025-02-10 PROCEDURE — 2500000004 HC RX 250 GENERAL PHARMACY W/ HCPCS (ALT 636 FOR OP/ED): Performed by: INTERNAL MEDICINE

## 2025-02-10 PROCEDURE — 93005 ELECTROCARDIOGRAM TRACING: CPT

## 2025-02-10 PROCEDURE — 81001 URINALYSIS AUTO W/SCOPE: CPT | Performed by: EMERGENCY MEDICINE

## 2025-02-10 PROCEDURE — 82947 ASSAY GLUCOSE BLOOD QUANT: CPT

## 2025-02-10 PROCEDURE — 0BH17EZ INSERTION OF ENDOTRACHEAL AIRWAY INTO TRACHEA, VIA NATURAL OR ARTIFICIAL OPENING: ICD-10-PCS | Performed by: INTERNAL MEDICINE

## 2025-02-10 PROCEDURE — 99291 CRITICAL CARE FIRST HOUR: CPT | Performed by: INTERNAL MEDICINE

## 2025-02-10 PROCEDURE — 85610 PROTHROMBIN TIME: CPT | Performed by: EMERGENCY MEDICINE

## 2025-02-10 PROCEDURE — 85025 COMPLETE CBC W/AUTO DIFF WBC: CPT | Performed by: NURSE PRACTITIONER

## 2025-02-10 PROCEDURE — 5A1945Z RESPIRATORY VENTILATION, 24-96 CONSECUTIVE HOURS: ICD-10-PCS | Performed by: INTERNAL MEDICINE

## 2025-02-10 PROCEDURE — 2500000005 HC RX 250 GENERAL PHARMACY W/O HCPCS: Performed by: EMERGENCY MEDICINE

## 2025-02-10 PROCEDURE — 93010 ELECTROCARDIOGRAM REPORT: CPT | Performed by: INTERNAL MEDICINE

## 2025-02-10 PROCEDURE — 31500 INSERT EMERGENCY AIRWAY: CPT | Performed by: EMERGENCY MEDICINE

## 2025-02-10 PROCEDURE — 83880 ASSAY OF NATRIURETIC PEPTIDE: CPT | Performed by: EMERGENCY MEDICINE

## 2025-02-10 PROCEDURE — 82140 ASSAY OF AMMONIA: CPT | Performed by: EMERGENCY MEDICINE

## 2025-02-10 PROCEDURE — 99291 CRITICAL CARE FIRST HOUR: CPT | Performed by: NURSE PRACTITIONER

## 2025-02-10 PROCEDURE — 2500000004 HC RX 250 GENERAL PHARMACY W/ HCPCS (ALT 636 FOR OP/ED)

## 2025-02-10 PROCEDURE — 83735 ASSAY OF MAGNESIUM: CPT | Performed by: EMERGENCY MEDICINE

## 2025-02-10 PROCEDURE — 84484 ASSAY OF TROPONIN QUANT: CPT | Performed by: EMERGENCY MEDICINE

## 2025-02-10 PROCEDURE — 85025 COMPLETE CBC W/AUTO DIFF WBC: CPT | Performed by: EMERGENCY MEDICINE

## 2025-02-10 PROCEDURE — 2500000004 HC RX 250 GENERAL PHARMACY W/ HCPCS (ALT 636 FOR OP/ED): Performed by: NURSE PRACTITIONER

## 2025-02-10 PROCEDURE — 71045 X-RAY EXAM CHEST 1 VIEW: CPT | Mod: FOREIGN READ | Performed by: RADIOLOGY

## 2025-02-10 PROCEDURE — 96375 TX/PRO/DX INJ NEW DRUG ADDON: CPT | Mod: 59

## 2025-02-10 PROCEDURE — 70450 CT HEAD/BRAIN W/O DYE: CPT

## 2025-02-10 PROCEDURE — 94002 VENT MGMT INPAT INIT DAY: CPT

## 2025-02-10 PROCEDURE — 87637 SARSCOV2&INF A&B&RSV AMP PRB: CPT | Performed by: EMERGENCY MEDICINE

## 2025-02-10 PROCEDURE — 80307 DRUG TEST PRSMV CHEM ANLYZR: CPT | Performed by: EMERGENCY MEDICINE

## 2025-02-10 PROCEDURE — 83735 ASSAY OF MAGNESIUM: CPT | Performed by: INTERNAL MEDICINE

## 2025-02-10 PROCEDURE — 2020000001 HC ICU ROOM DAILY

## 2025-02-10 PROCEDURE — 3E043XZ INTRODUCTION OF VASOPRESSOR INTO CENTRAL VEIN, PERCUTANEOUS APPROACH: ICD-10-PCS | Performed by: INTERNAL MEDICINE

## 2025-02-10 PROCEDURE — 37799 UNLISTED PX VASCULAR SURGERY: CPT | Performed by: NURSE PRACTITIONER

## 2025-02-10 PROCEDURE — 83605 ASSAY OF LACTIC ACID: CPT | Performed by: EMERGENCY MEDICINE

## 2025-02-10 PROCEDURE — 51702 INSERT TEMP BLADDER CATH: CPT

## 2025-02-10 PROCEDURE — 2500000005 HC RX 250 GENERAL PHARMACY W/O HCPCS

## 2025-02-10 PROCEDURE — 82248 BILIRUBIN DIRECT: CPT | Performed by: EMERGENCY MEDICINE

## 2025-02-10 PROCEDURE — 99291 CRITICAL CARE FIRST HOUR: CPT | Mod: 25 | Performed by: EMERGENCY MEDICINE

## 2025-02-10 PROCEDURE — 99152 MOD SED SAME PHYS/QHP 5/>YRS: CPT

## 2025-02-10 RX ORDER — ONDANSETRON HYDROCHLORIDE 2 MG/ML
4 INJECTION, SOLUTION INTRAVENOUS ONCE
Status: COMPLETED | OUTPATIENT
Start: 2025-02-10 | End: 2025-02-10

## 2025-02-10 RX ORDER — GABAPENTIN 400 MG/1
400 CAPSULE ORAL 3 TIMES DAILY
Status: DISCONTINUED | OUTPATIENT
Start: 2025-02-10 | End: 2025-02-19 | Stop reason: HOSPADM

## 2025-02-10 RX ORDER — POLYETHYLENE GLYCOL 3350 17 G/17G
17 POWDER, FOR SOLUTION ORAL DAILY
Status: DISCONTINUED | OUTPATIENT
Start: 2025-02-10 | End: 2025-02-19 | Stop reason: HOSPADM

## 2025-02-10 RX ORDER — ENOXAPARIN SODIUM 100 MG/ML
40 INJECTION SUBCUTANEOUS EVERY 24 HOURS
Status: DISCONTINUED | OUTPATIENT
Start: 2025-02-10 | End: 2025-02-12

## 2025-02-10 RX ORDER — POTASSIUM CHLORIDE 14.9 MG/ML
INJECTION INTRAVENOUS
Status: COMPLETED
Start: 2025-02-10 | End: 2025-02-10

## 2025-02-10 RX ORDER — MIDAZOLAM HYDROCHLORIDE 1 MG/ML
2 INJECTION, SOLUTION INTRAMUSCULAR; INTRAVENOUS ONCE
Status: COMPLETED | OUTPATIENT
Start: 2025-02-10 | End: 2025-02-10

## 2025-02-10 RX ORDER — FAMOTIDINE 20 MG/1
20 TABLET, FILM COATED ORAL 2 TIMES DAILY
Status: DISCONTINUED | OUTPATIENT
Start: 2025-02-10 | End: 2025-02-11

## 2025-02-10 RX ORDER — NOREPINEPHRINE BITARTRATE/D5W 8 MG/250ML
0-.5 PLASTIC BAG, INJECTION (ML) INTRAVENOUS CONTINUOUS
Status: DISCONTINUED | OUTPATIENT
Start: 2025-02-10 | End: 2025-02-13

## 2025-02-10 RX ORDER — ALPRAZOLAM 0.5 MG/1
0.25 TABLET ORAL DAILY PRN
Status: DISCONTINUED | OUTPATIENT
Start: 2025-02-10 | End: 2025-02-14

## 2025-02-10 RX ORDER — LISINOPRIL AND HYDROCHLOROTHIAZIDE 12.5; 2 MG/1; MG/1
1 TABLET ORAL DAILY
Status: DISCONTINUED | OUTPATIENT
Start: 2025-02-10 | End: 2025-02-10 | Stop reason: CLARIF

## 2025-02-10 RX ORDER — ONDANSETRON HYDROCHLORIDE 2 MG/ML
4 INJECTION, SOLUTION INTRAVENOUS ONCE
Status: COMPLETED | OUTPATIENT
Start: 2025-02-10 | End: 2025-02-16

## 2025-02-10 RX ORDER — POTASSIUM CHLORIDE 14.9 MG/ML
20 INJECTION INTRAVENOUS ONCE
Status: COMPLETED | OUTPATIENT
Start: 2025-02-10 | End: 2025-02-10

## 2025-02-10 RX ORDER — POLYETHYLENE GLYCOL 3350 17 G/17G
17 POWDER, FOR SOLUTION ORAL DAILY
Status: DISCONTINUED | OUTPATIENT
Start: 2025-02-11 | End: 2025-02-10 | Stop reason: SDUPTHER

## 2025-02-10 RX ORDER — MAGNESIUM SULFATE HEPTAHYDRATE 40 MG/ML
4 INJECTION, SOLUTION INTRAVENOUS ONCE
Status: COMPLETED | OUTPATIENT
Start: 2025-02-10 | End: 2025-02-11

## 2025-02-10 RX ORDER — ETOMIDATE 2 MG/ML
INJECTION INTRAVENOUS CODE/TRAUMA/SEDATION MEDICATION
Status: COMPLETED | OUTPATIENT
Start: 2025-02-10 | End: 2025-02-10

## 2025-02-10 RX ORDER — ASPIRIN 81 MG/1
81 TABLET ORAL DAILY
Status: DISCONTINUED | OUTPATIENT
Start: 2025-02-10 | End: 2025-02-19 | Stop reason: HOSPADM

## 2025-02-10 RX ORDER — EPINEPHRINE 0.1 MG/ML
INJECTION INTRACARDIAC; INTRAVENOUS CODE/TRAUMA/SEDATION MEDICATION
Status: COMPLETED | OUTPATIENT
Start: 2025-02-10 | End: 2025-02-10

## 2025-02-10 RX ORDER — MIDAZOLAM HYDROCHLORIDE 1 MG/ML
INJECTION INTRAMUSCULAR; INTRAVENOUS
Status: COMPLETED
Start: 2025-02-10 | End: 2025-02-10

## 2025-02-10 RX ORDER — NALOXONE HYDROCHLORIDE 1 MG/ML
INJECTION INTRAMUSCULAR; INTRAVENOUS; SUBCUTANEOUS
Status: COMPLETED
Start: 2025-02-10 | End: 2025-02-10

## 2025-02-10 RX ORDER — NOREPINEPHRINE BITARTRATE/D5W 8 MG/250ML
PLASTIC BAG, INJECTION (ML) INTRAVENOUS
Status: COMPLETED
Start: 2025-02-10 | End: 2025-02-10

## 2025-02-10 RX ORDER — HYDROCHLOROTHIAZIDE 12.5 MG/1
12.5 TABLET ORAL DAILY
Status: DISCONTINUED | OUTPATIENT
Start: 2025-02-11 | End: 2025-02-15

## 2025-02-10 RX ORDER — PROPOFOL 10 MG/ML
INJECTION, EMULSION INTRAVENOUS
Status: COMPLETED
Start: 2025-02-10 | End: 2025-02-10

## 2025-02-10 RX ORDER — SODIUM CHLORIDE, SODIUM LACTATE, POTASSIUM CHLORIDE, CALCIUM CHLORIDE 600; 310; 30; 20 MG/100ML; MG/100ML; MG/100ML; MG/100ML
75 INJECTION, SOLUTION INTRAVENOUS CONTINUOUS
Status: ACTIVE | OUTPATIENT
Start: 2025-02-10 | End: 2025-02-11

## 2025-02-10 RX ORDER — POTASSIUM CHLORIDE 14.9 MG/ML
20 INJECTION INTRAVENOUS ONCE
Status: DISCONTINUED | OUTPATIENT
Start: 2025-02-10 | End: 2025-02-10

## 2025-02-10 RX ORDER — INDOMETHACIN 25 MG/1
CAPSULE ORAL CODE/TRAUMA/SEDATION MEDICATION
Status: COMPLETED | OUTPATIENT
Start: 2025-02-10 | End: 2025-02-10

## 2025-02-10 RX ORDER — SUCCINYLCHOLINE CHLORIDE 20 MG/ML
INJECTION INTRAMUSCULAR; INTRAVENOUS CODE/TRAUMA/SEDATION MEDICATION
Status: COMPLETED | OUTPATIENT
Start: 2025-02-10 | End: 2025-02-10

## 2025-02-10 RX ORDER — PROPOFOL 10 MG/ML
0-50 INJECTION, EMULSION INTRAVENOUS CONTINUOUS
Status: DISCONTINUED | OUTPATIENT
Start: 2025-02-10 | End: 2025-02-12

## 2025-02-10 RX ORDER — MAGNESIUM SULFATE HEPTAHYDRATE 40 MG/ML
2 INJECTION, SOLUTION INTRAVENOUS ONCE
Status: COMPLETED | OUTPATIENT
Start: 2025-02-10 | End: 2025-02-10

## 2025-02-10 RX ORDER — LISINOPRIL 20 MG/1
20 TABLET ORAL DAILY
Status: DISCONTINUED | OUTPATIENT
Start: 2025-02-11 | End: 2025-02-15

## 2025-02-10 RX ORDER — ONDANSETRON HYDROCHLORIDE 2 MG/ML
INJECTION, SOLUTION INTRAVENOUS
Status: COMPLETED
Start: 2025-02-10 | End: 2025-02-10

## 2025-02-10 RX ORDER — POTASSIUM CHLORIDE 29.8 MG/ML
40 INJECTION INTRAVENOUS ONCE
Status: COMPLETED | OUTPATIENT
Start: 2025-02-10 | End: 2025-02-10

## 2025-02-10 RX ORDER — FAMOTIDINE 10 MG/ML
20 INJECTION, SOLUTION INTRAVENOUS 2 TIMES DAILY
Status: DISCONTINUED | OUTPATIENT
Start: 2025-02-10 | End: 2025-02-11

## 2025-02-10 RX ORDER — NALOXONE HYDROCHLORIDE 1 MG/ML
2 INJECTION INTRAMUSCULAR; INTRAVENOUS; SUBCUTANEOUS ONCE
Status: COMPLETED | OUTPATIENT
Start: 2025-02-10 | End: 2025-02-10

## 2025-02-10 RX ORDER — ERGOCALCIFEROL 1.25 MG/1
100000 CAPSULE ORAL WEEKLY
Status: DISCONTINUED | OUTPATIENT
Start: 2025-02-10 | End: 2025-02-10 | Stop reason: WASHOUT

## 2025-02-10 RX ORDER — AMIODARONE HYDROCHLORIDE 150 MG/3ML
INJECTION, SOLUTION INTRAVENOUS CODE/TRAUMA/SEDATION MEDICATION
Status: COMPLETED | OUTPATIENT
Start: 2025-02-10 | End: 2025-02-10

## 2025-02-10 RX ADMIN — AMIODARONE HYDROCHLORIDE 150 MG: 50 INJECTION, SOLUTION INTRAVENOUS at 19:05

## 2025-02-10 RX ADMIN — MIDAZOLAM HYDROCHLORIDE 2 MG: 1 INJECTION, SOLUTION INTRAMUSCULAR; INTRAVENOUS at 19:30

## 2025-02-10 RX ADMIN — SODIUM CHLORIDE, POTASSIUM CHLORIDE, SODIUM LACTATE AND CALCIUM CHLORIDE 75 ML/HR: 600; 310; 30; 20 INJECTION, SOLUTION INTRAVENOUS at 19:35

## 2025-02-10 RX ADMIN — PROPOFOL 10 MCG/KG/MIN: 10 INJECTION, EMULSION INTRAVENOUS at 15:44

## 2025-02-10 RX ADMIN — MAGNESIUM SULFATE HEPTAHYDRATE 4 G: 40 INJECTION, SOLUTION INTRAVENOUS at 21:50

## 2025-02-10 RX ADMIN — EPINEPHRINE 0.1 MG: 0.1 INJECTION INTRACARDIAC; INTRAVENOUS at 19:11

## 2025-02-10 RX ADMIN — Medication 0.15 MCG/KG/MIN: at 19:13

## 2025-02-10 RX ADMIN — POTASSIUM CHLORIDE 20 MEQ: 14.9 INJECTION INTRAVENOUS at 20:28

## 2025-02-10 RX ADMIN — MIDAZOLAM HYDROCHLORIDE 2 MG: 1 INJECTION, SOLUTION INTRAMUSCULAR; INTRAVENOUS at 19:16

## 2025-02-10 RX ADMIN — PROPOFOL 15 MCG/KG/MIN: 10 INJECTION, EMULSION INTRAVENOUS at 16:06

## 2025-02-10 RX ADMIN — AMIODARONE HYDROCHLORIDE 360 MG: 1.8 INJECTION, SOLUTION INTRAVENOUS at 19:32

## 2025-02-10 RX ADMIN — SODIUM BICARBONATE 50 MEQ: 84 INJECTION, SOLUTION INTRAVENOUS at 19:07

## 2025-02-10 RX ADMIN — ETOMIDATE 20 MG: 2 INJECTION, SOLUTION INTRAVENOUS at 15:37

## 2025-02-10 RX ADMIN — NALOXONE HYDROCHLORIDE 2 MG: 1 INJECTION PARENTERAL at 15:59

## 2025-02-10 RX ADMIN — POTASSIUM CHLORIDE 20 MEQ: 14.9 INJECTION, SOLUTION INTRAVENOUS at 20:28

## 2025-02-10 RX ADMIN — ONDANSETRON 4 MG: 2 INJECTION INTRAMUSCULAR; INTRAVENOUS at 15:58

## 2025-02-10 RX ADMIN — SUCCINYLCHOLINE CHLORIDE 140 MG: 20 INJECTION, SOLUTION INTRAMUSCULAR; INTRAVENOUS at 15:37

## 2025-02-10 RX ADMIN — ONDANSETRON HYDROCHLORIDE 4 MG: 2 INJECTION, SOLUTION INTRAVENOUS at 15:58

## 2025-02-10 RX ADMIN — EPINEPHRINE 1 MG: 0.1 INJECTION INTRACARDIAC; INTRAVENOUS at 19:01

## 2025-02-10 RX ADMIN — FAMOTIDINE 20 MG: 10 INJECTION, SOLUTION INTRAVENOUS at 20:08

## 2025-02-10 RX ADMIN — POTASSIUM CHLORIDE 40 MEQ: 29.8 INJECTION, SOLUTION INTRAVENOUS at 21:49

## 2025-02-10 RX ADMIN — VASOPRESSIN 0.03 UNITS/MIN: 0.2 INJECTION INTRAVENOUS at 20:09

## 2025-02-10 RX ADMIN — PROPOFOL 20 MCG/KG/MIN: 10 INJECTION, EMULSION INTRAVENOUS at 16:21

## 2025-02-10 RX ADMIN — MAGNESIUM SULFATE IN WATER 2 G: 2 INJECTION, SOLUTION INTRAVENOUS at 19:24

## 2025-02-10 RX ADMIN — NALOXONE HYDROCHLORIDE 2 MG: 1 INJECTION INTRAMUSCULAR; INTRAVENOUS; SUBCUTANEOUS at 15:59

## 2025-02-10 RX ADMIN — Medication 60 PERCENT: at 23:17

## 2025-02-10 RX ADMIN — HYDROCORTISONE SODIUM SUCCINATE 100 MG: 100 INJECTION, POWDER, FOR SOLUTION INTRAMUSCULAR; INTRAVENOUS at 20:08

## 2025-02-10 SDOH — SOCIAL STABILITY: SOCIAL INSECURITY: HAS ANYONE EVER THREATENED TO HURT YOUR FAMILY OR YOUR PETS?: UNABLE TO ASSESS

## 2025-02-10 SDOH — SOCIAL STABILITY: SOCIAL INSECURITY: ARE YOU OR HAVE YOU BEEN THREATENED OR ABUSED PHYSICALLY, EMOTIONALLY, OR SEXUALLY BY ANYONE?: UNABLE TO ASSESS

## 2025-02-10 SDOH — SOCIAL STABILITY: SOCIAL INSECURITY: HAVE YOU HAD THOUGHTS OF HARMING ANYONE ELSE?: UNABLE TO ASSESS

## 2025-02-10 SDOH — SOCIAL STABILITY: SOCIAL INSECURITY: DO YOU FEEL ANYONE HAS EXPLOITED OR TAKEN ADVANTAGE OF YOU FINANCIALLY OR OF YOUR PERSONAL PROPERTY?: UNABLE TO ASSESS

## 2025-02-10 SDOH — SOCIAL STABILITY: SOCIAL INSECURITY: ARE THERE ANY APPARENT SIGNS OF INJURIES/BEHAVIORS THAT COULD BE RELATED TO ABUSE/NEGLECT?: UNABLE TO ASSESS

## 2025-02-10 SDOH — SOCIAL STABILITY: SOCIAL INSECURITY: ABUSE: ADULT

## 2025-02-10 SDOH — SOCIAL STABILITY: SOCIAL INSECURITY: DOES ANYONE TRY TO KEEP YOU FROM HAVING/CONTACTING OTHER FRIENDS OR DOING THINGS OUTSIDE YOUR HOME?: UNABLE TO ASSESS

## 2025-02-10 SDOH — SOCIAL STABILITY: SOCIAL INSECURITY: HAVE YOU HAD ANY THOUGHTS OF HARMING ANYONE ELSE?: UNABLE TO ASSESS

## 2025-02-10 SDOH — SOCIAL STABILITY: SOCIAL INSECURITY: WERE YOU ABLE TO COMPLETE ALL THE BEHAVIORAL HEALTH SCREENINGS?: NO

## 2025-02-10 SDOH — SOCIAL STABILITY: SOCIAL INSECURITY: DO YOU FEEL UNSAFE GOING BACK TO THE PLACE WHERE YOU ARE LIVING?: UNABLE TO ASSESS

## 2025-02-10 ASSESSMENT — ACTIVITIES OF DAILY LIVING (ADL)
PATIENT'S MEMORY ADEQUATE TO SAFELY COMPLETE DAILY ACTIVITIES?: UNABLE TO ASSESS
HEARING - RIGHT EAR: UNABLE TO ASSESS
FEEDING YOURSELF: UNABLE TO ASSESS
JUDGMENT_ADEQUATE_SAFELY_COMPLETE_DAILY_ACTIVITIES: UNABLE TO ASSESS
TOILETING: UNABLE TO ASSESS
GROOMING: UNABLE TO ASSESS
ADEQUATE_TO_COMPLETE_ADL: UNABLE TO ASSESS
HEARING - LEFT EAR: UNABLE TO ASSESS
WALKS IN HOME: UNABLE TO ASSESS
BATHING: UNABLE TO ASSESS
DRESSING YOURSELF: UNABLE TO ASSESS

## 2025-02-10 ASSESSMENT — LIFESTYLE VARIABLES
AUDIT-C TOTAL SCORE: -1
HOW MANY STANDARD DRINKS CONTAINING ALCOHOL DO YOU HAVE ON A TYPICAL DAY: PATIENT UNABLE TO ANSWER
HOW OFTEN DO YOU HAVE A DRINK CONTAINING ALCOHOL: PATIENT UNABLE TO ANSWER
AUDIT-C TOTAL SCORE: -1
SKIP TO QUESTIONS 9-10: 0
HOW OFTEN DO YOU HAVE 6 OR MORE DRINKS ON ONE OCCASION: PATIENT UNABLE TO ANSWER

## 2025-02-10 ASSESSMENT — PAIN - FUNCTIONAL ASSESSMENT
PAIN_FUNCTIONAL_ASSESSMENT: CPOT (CRITICAL CARE PAIN OBSERVATION TOOL)
PAIN_FUNCTIONAL_ASSESSMENT: CPOT (CRITICAL CARE PAIN OBSERVATION TOOL)

## 2025-02-10 ASSESSMENT — COGNITIVE AND FUNCTIONAL STATUS - GENERAL: PATIENT BASELINE BEDBOUND: UNABLE TO ASSESS AT THIS TIME

## 2025-02-10 NOTE — ED NOTES
Zofran and narcan override pull per MD at bedside and administered to patient./      Hoang Granados RN  02/10/25 [id number]

## 2025-02-10 NOTE — ED TRIAGE NOTES
"Pt brought to room 2 from EMS with the c/o overdose. Per  EMS pt snorted a white substance from a bag that she got from her friend because she \"just wanted to feel better\". Pt non-responsive upon entry of room. MD and RT at bedside with multiple RN and medic. Pt placed on cardiac monitor with cycling BP's and continuous pulse oximetry. IV inserted 18F L-AC, blood work obtained. While medic was performing EKG, RN completed straight cath for urine toxicology panel and urinalysis.   "

## 2025-02-10 NOTE — H&P
"  Impression/Plan: Rashmi Oreilly is a 68 y/o f pmhx of HCV cirrhosis, OA s/p Left SANDI, ICD placement, depression, anxiety on Alprazolam at home, who is admitted to the ICU with acute respiratory failure with hypoxemia d/t aspiration (most likely) in the setting of airway compromise as a consequence of toxic encephalopathy d/t cocaine, fentanyl with likely less contribution from benzodiazepines as patient takes Alprazolam at home.         Neuro  - A-F bundle   - Sleep Hygiene measures: appropriate daytime stimulation/physical activity. Lights out at 2100, avoidance of night time lab draws/night baths, avoidance of delirium provoking medications  - Sedation: propofol gtt titrated to goal RASS 0 to -2       CV  - goal MAP >/= 65 mm hg         Pulm:   - Vent support: adjust settings as needed to maintain SpO2 > 88%, pH >7.25, Ppl < 30 cm H2O while optimizing patient vent synchrony       Renal/electrolyte/acid-base:   - avoid nephrotoxins as much as possible   - replace electrolytes as indicated     GI/Nutrition   - advance diet once swallow eval is passed   - GI ppx: H2B    ID  - nil     Heme   - DVT ppx: SCenox    Endocrine  - goal serum glucose 140-180 mg/dL     Musculoskeletal/skin  - skin protective measures   - PT/OT when able     Family contact:  Margaret Baer (mother)       Critical Care time: 50 min                 History Of Present Illness  Rashmi Oreilly is a 69 y.o. female with a pmhx of HCV cirrhosis, OA s/p Left SANDI, ICD placement, depression, anxiety on Alprazolam at home, who presented to the ED with acute encephalopathy after nasally ingesting an \"unknown\" white substance from a friend as she \" wanted to feel better\" per ED triage notes.  Per ED staff, patient was profoundly encephalopathic, thus was intubated for airway protection. Subsequent tox screen was notably + for cocaine and fentanyl in addition to Benzodiazepines. CT head without contrast was unremarkable for ICH. Patient is admitted to " the ICU for further care.      Past Medical History  Past Medical History:   Diagnosis Date    Anxiety     Cirrhosis (Multi)     Depression     Disease of thyroid gland     Hepatitis C     Spondylolysis, lumbar region        Surgical History  Past Surgical History:   Procedure Laterality Date    PACEMAKER PLACEMENT      TOTAL HIP ARTHROPLASTY          Social History  She reports that she has been smoking cigarettes. She has a 6 pack-year smoking history. She has been exposed to tobacco smoke. She has never used smokeless tobacco. She reports that she does not drink alcohol and does not use drugs.    Family History  Reviewed      Allergies  Cephalexin    Review of Systems   Unable to perform ROS: Intubated        Physical Exam     Last Recorded Vitals  Blood pressure 89/76, pulse 65, temperature 34.9 °C (94.8 °F), resp. rate (!) 26, weight 93 kg (205 lb 0.4 oz), SpO2 (!) 93%.    Obese, elderly female, Intubated, sedated   Neuro: No focal deficits, however exam is limited by sedation   CV: S1s2  Pulm: CTAB  Chest: neg accessory muscle recruitment   Abd: soft, non-tender, umbilical hernia which is reducible   Ext: well perfused, cap refill < 2 sec  Skin: warm and dry, chronic venous stasis changes over lower legs b/l, no mottling or rash

## 2025-02-10 NOTE — H&P
"Critical Care Medicine:  History & Physical    History of Present Illness     69 y.o. female with a PMH of cardiac arrest in 2010 s/p AICD ,  Afib, HTN HLD, chronically on xanax (11 years) panic disorder, remote hx of IVDU,, chronic Low back pain,  Chronic Hep C with cirrhosis, hypothyroid,  former smoker, presented to [facility] ED ? From [place] d/t change in [place]. Allegedly a friend brought in a \" white powder\" and she snorted. Limited HPI, gathered from EMR and report, EMS report she was somnolent and lethargic, given 8mg of Narcan which did not yield  positive result. She was intubated in the ER.  No report of fall or trauma.   Her Utox came back positive Benzodiazepine, Cocaine and Fentanyl. Head CT was negative for bleed. Initial ABG was unremarkbale. Scr 1.33 ( previously was 1.43)     Interval Note: code blue was called, patient HR bertha down and became pulseless. 6-10 mins of CPR,  with ~1mg +0.5mg + 0 .1 epi given, in between sequence with brief rosc.. Ultimately obtained ROSC  See Code Run sheet.   Was started on Levo and Vasopressin.     ROS:  Review of Systems   Reason unable to perform ROS: unable: Intubated and sedated.       Objective   Previous History     Medical History  As above.    Surgical History  Past Surgical History:   Procedure Laterality Date    PACEMAKER PLACEMENT      TOTAL HIP ARTHROPLASTY       Allergies  Allergies   Allergen Reactions    Cephalexin Unknown     Pt reported swelling of breast and abdomen and not feeling like herself.       Home Medications  Current Outpatient Medications   Medication Instructions    ALPRAZolam (XANAX) 0.5 mg, oral, 2 times daily PRN    aspirin 81 mg, oral, 2 times daily    ergocalciferol (VITAMIN D-2) 100,000 Units, oral, Weekly    gabapentin (NEURONTIN) 800 mg, oral, 3 times daily    levothyroxine (SYNTHROID, LEVOXYL) 137 mcg, oral, Daily, 3 tablets once per day    lisinopriL-hydrochlorothiazide 20-12.5 mg tablet 1 tablet, oral, Daily "     Social History  Social History     Tobacco Use   Smoking Status Every Day    Current packs/day: 1.00    Average packs/day: 1 pack/day for 6.0 years (6.0 ttl pk-yrs)    Types: Cigarettes    Passive exposure: Current   Smokeless Tobacco Never       reports no history of alcohol use.    reports no history of drug use.     Family History  family history is not on file.    Objective     Vitals:    02/10/25 1533   Weight: 93 kg (205 lb 0.4 oz)   Body mass index is 34.12 kg/m².        2/10/2025     4:15 PM 2/10/2025     4:30 PM 2/10/2025     4:45 PM 2/10/2025     5:02 PM 2/10/2025     5:20 PM 2/10/2025     6:05 PM 2/10/2025     6:29 PM   Vitals   Systolic 115 107 102  89     Diastolic 92 90 87  76     Heart Rate 71 65 65 63 65     Temp    35 °C (95 °F) 34.9 °C (94.8 °F)  35.1 °C (95.1 °F)   Resp   27  26 23         Vent settings:  Vent Mode: Assist control/Volume control plus  S RR:  [14] 14  S VT:  [400 mL] 400 mL  PEEP/CPAP (cm H2O):  [5 cm H20] 5 cm H20  MAP (cm H2O):  [8.1-9.3] 9.3  No intake or output data in the 24 hours ending 02/10/25 1849    Physical Exam  Constitutional:       Appearance: She is ill-appearing.   Eyes:      Comments: Sluggish reaction,    Cardiovascular:      Rate and Rhythm: Rhythm irregular.   Pulmonary:      Comments: Dimished   Abdominal:      General: Abdomen is protuberant. Bowel sounds are decreased.   Genitourinary:     Comments: Bazan Clear yellow   Skin:     General: Skin is cool.   Neurological:      Mental Status: She is unresponsive.          Medications     Scheduled Medications:   [Held by provider] aspirin, 81 mg, oral, Daily  enoxaparin, 40 mg, subcutaneous, q24h  [Held by provider] ergocalciferol, 100,000 Units, oral, Weekly  famotidine, 20 mg, oral, BID   Or  famotidine, 20 mg, intravenous, BID  [Held by provider] gabapentin, 800 mg, oral, TID  [Held by provider] levothyroxine, 137 mcg, oral, Daily  [Held by provider] lisinopriL-hydrochlorothiazide, 1 tablet, oral,  "Daily  magnesium sulfate, 2 g, intravenous, Once  ondansetron, 4 mg, intravenous, Once  polyethylene glycol, 17 g, oral, Daily  psyllium, 1 packet, oral, Daily       Continuous Medications:   lactated Ringer's, 75 mL/hr  propofol, 0-50 mcg/kg/min, Last Rate: 10 mcg/kg/min (02/10/25 1821)       PRN Medications:     Labs     Results from last 72 hours   Lab Units 02/10/25  1524   GLUCOSE mg/dL 118*   SODIUM mmol/L 141   POTASSIUM mmol/L 4.2   CHLORIDE mmol/L 111*   CO2 mmol/L 23   BUN mg/dL 26*   CREATININE mg/dL 1.33*   EGFR mL/min/1.73m*2 44*   CALCIUM mg/dL 9.4   ALBUMIN g/dL 3.2*   MAGNESIUM mg/dL 1.48*     Results from last 72 hours   Lab Units 02/10/25  1524   ALK PHOS U/L 114   ALT U/L 37   AST U/L 51*   BILIRUBIN TOTAL mg/dL 1.2   PROTEIN TOTAL g/dL 6.8     Results from last 72 hours   Lab Units 02/10/25  1722 02/10/25  1524   TROPHS ng/L 4 4     Results from last 72 hours   Lab Units 02/10/25  1521   LACTATE mmol/L 1.1     Results from last 72 hours   Lab Units 02/10/25  1521   WBC AUTO x10*3/uL 3.9*   NRBC AUTO /100 WBCs 0.0   RBC AUTO x10*6/uL 2.69*   HEMOGLOBIN g/dL 9.0*   HEMATOCRIT % 27.9*   MCV fL 104*   MCH pg 33.5   MCHC g/dL 32.3   RDW % 14.5   PLATELETS AUTO x10*3/uL 51*     Results from last 72 hours   Lab Units 02/10/25  1611 02/10/25  1524   POCT PH, ARTERIAL pH 7.41  --    POCT PCO2, ARTERIAL mm Hg 37*  --    POCT PO2, ARTERIAL mm Hg 82*  --    POCT HCO3 CALCULATED, ARTERIAL mmol/L 23.5  --    POCT LACTATE, ARTERIAL mmol/L 1.3  --    POCT BASE EXCESS, ARTERIAL mmol/L -0.8  --    FIO2 % 100 36     No results found for: \"BLOODCULT\", \"GRAMSTAIN\"  No results found for: \"URINECULTURE\"    Imaging and Diagnostic Studies   CT head wo IV contrast    Result Date: 2/10/2025  Interpreted By:  Blaine Perez, STUDY: SARIKA RAMIREZ; 2/10/2025 5:17 pm   INDICATION: Signs/Symptoms:change in ms;   COMPARISON: None   ACCESSION NUMBER(S): [id number]   ORDERING CLINICIAN: SARIKA RAMIREZ   TECHNIQUE: Contiguous " axial images were acquired from the vertex through the posterior fossa without IV contrast. All CT examinations are performed with 1 or more of the following dose reduction techniques: Automated exposure control, adjustment of mA and/or kv according to patient's size, or use of iterative reconstruction techniques.   FINDINGS: No focal mass effect or midline shift is identified. The ventricles and sulci are symmetric and appropriate for the patient's age.   The gray white matter differentiation is preserved. Mild-moderate degree of nonspecific white matter hypodensity which can be seen with chronic small-vessel ischemic disease.   No acute intracranial hemorrhage is seen. No intra-axial or extra-axial fluid collection is seen.   The visualized paranasal sinuses and mastoid air cells are clear.       No CT evidence for acute intracranial pathology.   Signed by: Blaine Perez 2/10/2025 5:49 PM Dictation workstation:   [id number]    XR chest abdomen for OG NG placement    Result Date: 2/10/2025  Interpreted By:  Rachael James, STUDY: XR CHEST ABDOMEN FOR OG NG PLACEMENT; ;  2/10/2025 3:59 pm   INDICATION: Signs/Symptoms:sob.     COMPARISON: None.   ACCESSION NUMBER(S): [id number]   ORDERING CLINICIAN: SARIKA RAMIREZ   FINDINGS: AP radiographs of the chest/upper abdomen   Left subclavian pacemaker/AICD. The enteric tube distal tip projects at the expected location of the gastric body. The endotracheal tube distal tip projects 5.3 cm above the seven. Cardiomediastinal silhouette is within the upper limits of normal in size. Mild opacification of the left lung base may reflect atelectasis or small infiltrate. The right lung is clear. No pulmonary edema, pleural effusion or pneumothorax. Limited evaluation of the bowel gas pattern due to overlying soft tissue opacification.       1. Mild opacification of the left lung base may reflect atelectasis or small infiltrate. 2. The enteric tube distal tip projects at the  expected location of the gastric body. 3. The endotracheal tube distal tip projects 5.3 cm above the seven.   MACRO: None   Signed by: Rachael James 2/10/2025 4:28 PM Dictation workstation:   [id number]    ECG 12 lead    Result Date: 2/10/2025  Sinus rhythm with occasional Premature ventricular complexes Low voltage QRS Possible Anterolateral infarct , age undetermined Prolonged QT Abnormal ECG No previous ECGs available          Assessment / Plan     # Acute Toxic Encephalopathy   # ? Overdose  #Cardiac arrest    -? AICD function vs Malfunction   # Acute Resp Failure   # Lactic Acidosis     Neuro:    -keep intubated, sedated  -temp goal @ 36   -Q1 neuro check per ICU protocol   -CAM ICU score q-shift   -Sleep/wake cycle hygiene   -Delirium precaution     CV:     Hx  HLD HTN   -Levophed and Vassorepssin with map goal >65mmhg   -TTE  -Cards cx for AICD interrogation, given the arrest   -continue Asprin  -hold  HTN medication     -Continuous cardiac monitoring and hourly vital signs??   -Electrolytes daily monitoring     ? PULM:    -keep pt on the vent   -oxygen support   -keep pt on the vent  -titrate fio2 with ABG/SPO2   ?   GI/FEN/Endo:??    -NPO,   -PPI  -synthroid   -Daily electrolyte as replete as needed??   -POCT Q4, ISS, Hypoglycemia protocol      Renal/: currently at baseline  -Strict I&O   -Daily weights   -Renal Panel in am   -ICU electrolyte replacement protocol- keep potassium >4, magnesium >2. Trend daily BMP, mag, phos.   -Avoid Nephrotoxic Agents, NSAIDs/ Renal dose medications?   -Beni      Heme/ID:???   -SCDs   -pDVT-  lovenox  Subcutaneous   -ATB ; curerntly I have no indication for ATB  -she was given ATB in the ED>   -urinalysis: small LE    MSK/Integumentary:   Reposition, Pressure relieving measures   PT/OT-may need   Post discharge care: [facility]      Lines: IV, CVC, art line placed    ???????????????????????????????????????????????????????????????????????????????????????????????????????????????????????????????????????????????????????  Social/family/dispo: ICU, called patient mother Corinne Healey, updated. Patient has a , mother will try to reach him and update him     Code; Full.        Ana Paula Mcclain, APRN-CNP    This patient is critically ill/injured due to acute impairment in one or more vital organ systems, such that there is a probably of imminent or life-threatening deterioration of the patient's condition.  I spent 120 minutes in the direct delivery of medical care that involves high complexity decision making to treat single or multiple vital organ system failure and/or prevent further life-threatening deterioration of the patient's condition.  This time does not include separately billable procedures.

## 2025-02-10 NOTE — ED PROVIDER NOTES
HPI   Chief Complaint   Patient presents with    Drug Overdose       HPI: []  69-year-old white female history of anxiety, liver cirrhosis, depression, thyroid disease, hepatitis C chronic back pain and ICD placement comes in with the overdose.  History obtained from EMS.  They states that patient took unknown amount of white powder from a friend and snorted it.  She became unresponsive.  EMS found her to be somewhat somnolent and lethargic.  She was given intranasal naloxone 8 mg with no results.  No stiff trauma falls fever chills nausea vomit diarrhea cough congestion incontinence seizures syncope onus given no hematemesis melena or hematochezia, no  hemoptysis.  History somewhat limited unfortunately.    Past history: Hepatitis C, liver cirrhosis, anxiety, depression, ICD placement  Social: Not available.  REVIEW OF SYSTEMS:    GENERAL.: No weight loss, fatigue, anorexia, insomnia, fever.    EYES: No vision loss, double vision, drainage, eye pain.    ENT: No pharyngitis, dry mouth.    CARDIOPULMONARY: No chest pain, palpitations, syncope, near syncope. No shortness of breath, cough, hemoptysis.    GI: No abdominal pain, change in bowel habits, melena, hematemesis, hematochezia, nausea, vomiting, diarrhea.    : No discharge, dysuria, frequency, urgency, hematuria.    MS: No limb pain, joint pain, joint swelling.  Neuro: Positive altered mental status  SKIN: No rashes.    PSYCH: No depression, anxiety, suicidality, homicidality.    Review of systems is otherwise negative unless stated above or in history of present illness.  Social history, family history, allergies reviewed.  PHYSICAL EXAM:    GENERAL: Vitals noted, rate distress.  Extremely lethargic and somnolent but arousable  Eyes: Pupils about 3 mm equal round reactive to light and accommodation EOMs could not be assessed she was uncooperative.  EENT: TMs clear. Posterior oropharynx unremarkable. No meningismus. No LAD.     NECK: Supple. Nontender. No  midline tenderness.     CARDIAC: Regular, rate, rhythm. No murmurs rubs or gallops. No JVD    PULMONARY: Lungs clear bilaterally with good aeration. No wheezes rales or rhonchi. No respiratory distress.     ABDOMEN: Soft, nonsurgical. Nontender. No peritoneal signs. Normoactive bowel sounds. No pulsatile masses.     EXTREMITIES: 2+ bilateral symmetric nonpitting peripheral edema. Negative Homans bilaterally, no cords.    SKIN: No rash. Intact.  Chronic venous dermatitis lower extremities bilaterally.    NEURO: No focal neurologic deficits, NIH score of 0. Cranial nerves normal as tested from II through XII.  Grossly intact moving all 4 extremities    MEDICAL DECISION MAKING:  EKG upon arrival to my interpretation shows a normal sinus rhythm low voltage QRS complexes rate in the mid 60s with nonspecific ST and T wave change.  CBC with shows pancytopenia, chemistries remarkable LFTs slightly abnormal, magnesium low 1.48 initial venous blood gas shows no hypercapnia.  CT head negative.  Chest x-ray negative.  Serum drug screen negative, urine Riskin positive for fentanyl and benzodiazepines and cocaine.    Treatment ED/ED course: On arrival patient was placed on cardiac monitor IV established she was given naloxone with no relief, the next 20 minutes patient became more unresponsive and obtunded.  At which point it was decided to intubate her for airway protection.  Patient was given etomidate and succinylcholine and she was intubated wire direct laryngoscopy with a 7M ETT without complications for attempted good placement which was confirmed by auscultation and by CO2 monitoring.  Post repeat x-ray shows a tube was above the seven about 5 cm which was pushed down.  Patient was also placed on a propofol drip for sedation.    Impression: #1 acute encephalopathy, #2 acute respite failure due to drug overdose, #3 drug overdose    Plan/MDM: 69-year-old female history of for depression anxiety liver cirrhosis hepatitis C  comes in with overdose, urine drug screen positive cocaine fentanyl and benzodiazepine.  Patient was intubated due to the fact she was getting more obtunded and unresponsive for airway compromise and airway protection, suspicion for stroke TIA septic shock sepsis STEMI NSTEMI is low.  Patient will be hospitalized to the ICU for further care.              Patient History   Past Medical History:   Diagnosis Date    Anxiety     Cirrhosis (Multi)     Depression     Disease of thyroid gland     Hepatitis C     Spondylolysis, lumbar region      Past Surgical History:   Procedure Laterality Date    PACEMAKER PLACEMENT      TOTAL HIP ARTHROPLASTY       No family history on file.  Social History     Tobacco Use    Smoking status: Every Day     Current packs/day: 1.00     Average packs/day: 1 pack/day for 6.0 years (6.0 ttl pk-yrs)     Types: Cigarettes     Passive exposure: Current    Smokeless tobacco: Never   Vaping Use    Vaping status: Never Used   Substance Use Topics    Alcohol use: Never    Drug use: Never       Physical Exam   ED Triage Vitals   Temperature Heart Rate Respirations BP   02/10/25 1702 02/10/25 1510 02/10/25 1510 02/10/25 1510   35 °C (95 °F) 70 (!) 8 (!) 133/94      Pulse Ox Temp Source Heart Rate Source Patient Position   02/10/25 1510 02/10/25 1702 -- --   (!) 93 % Bladder        BP Location FiO2 (%)     -- --             Physical Exam      ED Course & MDM   ED Course as of 02/10/25 1804   Mon Feb 10, 2025   1804 Upon arrival patient was arousable but somnolent over the next half an hour she became more responsive and obtunded due to airway compromise she was intubated without any complications, and patient will be hospitalized to the ICU for further care. [MT]      ED Course User Index  [MT] Caro Agarwal MD         Diagnoses as of 02/10/25 1804   Intentional drug overdose, initial encounter (Multi)   Acute respiratory failure, unspecified whether with hypoxia or hypercapnia                 No  data recorded                                 Medical Decision Making      Procedure  Critical Care    Performed by: Caro Agarwal MD  Authorized by: Caro Agarwal MD    Critical care provider statement:     Critical care time (minutes):  65    Critical care time was exclusive of:  Separately billable procedures and treating other patients    Critical care was necessary to treat or prevent imminent or life-threatening deterioration of the following conditions:  Toxidrome and respiratory failure    Critical care was time spent personally by me on the following activities:  Blood draw for specimens, development of treatment plan with patient or surrogate, discussions with consultants, discussions with primary provider, evaluation of patient's response to treatment, examination of patient, review of old charts, re-evaluation of patient's condition, pulse oximetry, ordering and review of radiographic studies, ordering and review of laboratory studies, ordering and performing treatments and interventions and obtaining history from patient or surrogate    Care discussed with: admitting provider    Intubation    Performed by: Caro Agarwal MD  Authorized by: Caro Agarwal MD    Consent:     Consent obtained:  Emergent situation    Consent given by: emergent.    Risks, benefits, and alternatives were discussed comment:  No    Procedural risks discussed: emergent.    Alternatives discussed: emergent.  Universal protocol:     Procedure explained and questions answered to patient or proxy's satisfaction: no      Relevant documents present and verified: yes      Test results available: yes      Imaging studies available: no      Required blood products, implants, devices, and special equipment available: yes      Site/side marked: no      Immediately prior to procedure, a time out was called: yes      Patient identity confirmed:  Hospital-assigned identification number and arm band  Pre-procedure details:      Indications: airway protection, altered consciousness and respiratory failure      Patient status:  Unresponsive    Look externally: no concerns      Mouth opening - incisor distance:  2 finger widths    Hyoid-mental distance: 2 finger widths      Hyoid-thyroid distance: 2 or more finger widths      Mallampati score:  IV    Obstruction: none      Neck mobility: normal      C-collar present comment:  No    Pharmacologic strategy: RSI      Induction agents:  Etomidate    Paralytics:  Succinylcholine  Procedure details:     Preoxygenation:  Bag valve mask    CPR in progress: no      Number of attempts:  1  Successful intubation attempt details:     Intubation method:  Oral    Intubation technique: direct      Laryngoscope blade:  Mac 4    Bougie used: no      Grade view: IV      Tube size (mm):  7.5    Tube type:  Cuffed    Tube visualized through cords: yes    Placement assessment:     Tube secured with:  ETT sarabia    Breath sounds:  Equal    Placement verification: chest rise, colorimetric ETCO2, CXR verification and waveform ETCO2      CXR findings:  High    Tube repositioned: yes    Post-procedure details:     Procedure completion:  Tolerated       Caro Agarwal MD  02/10/25 [id number]

## 2025-02-10 NOTE — Clinical Note
Closure device placed in the right radial artery. Site closed by radial compression system. 15 mls of air in TR band

## 2025-02-10 NOTE — Clinical Note
Prepped: right groin, right radial and right brachial. Pt has skin tears on lower abdomen and rt groin area

## 2025-02-10 NOTE — ED NOTES
Pt intubated per MD at bedside, color change noted on CO2 detector. OG, and armendariz catheter placed at this time. XR called for post intubation chest XR. Per MD propofol initiated for post intubation sedation.        Hoang Granados RN  02/10/25 [id number]

## 2025-02-11 ENCOUNTER — APPOINTMENT (OUTPATIENT)
Dept: CARDIOLOGY | Facility: HOSPITAL | Age: 70
DRG: 917 | End: 2025-02-11
Payer: MEDICARE

## 2025-02-11 LAB
ALBUMIN SERPL BCP-MCNC: 2.8 G/DL (ref 3.4–5)
ALP SERPL-CCNC: 100 U/L (ref 33–136)
ALT SERPL W P-5'-P-CCNC: 36 U/L (ref 7–45)
ANION GAP SERPL CALC-SCNC: 11 MMOL/L (ref 10–20)
ANION GAP SERPL CALC-SCNC: 8 MMOL/L (ref 10–20)
AST SERPL W P-5'-P-CCNC: 47 U/L (ref 9–39)
ATRIAL RATE: 67 BPM
BACTERIA UR CULT: NO GROWTH
BASOPHILS # BLD AUTO: 0.01 X10*3/UL (ref 0–0.1)
BASOPHILS NFR BLD AUTO: 0.2 %
BILIRUB SERPL-MCNC: 1.2 MG/DL (ref 0–1.2)
BUN SERPL-MCNC: 28 MG/DL (ref 6–23)
BUN SERPL-MCNC: 28 MG/DL (ref 6–23)
CALCIUM SERPL-MCNC: 8.7 MG/DL (ref 8.6–10.3)
CALCIUM SERPL-MCNC: 8.9 MG/DL (ref 8.6–10.3)
CHLORIDE SERPL-SCNC: 113 MMOL/L (ref 98–107)
CHLORIDE SERPL-SCNC: 114 MMOL/L (ref 98–107)
CO2 SERPL-SCNC: 22 MMOL/L (ref 21–32)
CO2 SERPL-SCNC: 24 MMOL/L (ref 21–32)
CREAT SERPL-MCNC: 1.41 MG/DL (ref 0.5–1.05)
CREAT SERPL-MCNC: 1.44 MG/DL (ref 0.5–1.05)
EGFRCR SERPLBLD CKD-EPI 2021: 40 ML/MIN/1.73M*2
EGFRCR SERPLBLD CKD-EPI 2021: 41 ML/MIN/1.73M*2
EOSINOPHIL # BLD AUTO: 0.01 X10*3/UL (ref 0–0.7)
EOSINOPHIL NFR BLD AUTO: 0.2 %
ERYTHROCYTE [DISTWIDTH] IN BLOOD BY AUTOMATED COUNT: 14.2 % (ref 11.5–14.5)
GLUCOSE BLD MANUAL STRIP-MCNC: 109 MG/DL (ref 74–99)
GLUCOSE BLD MANUAL STRIP-MCNC: 112 MG/DL (ref 74–99)
GLUCOSE BLD MANUAL STRIP-MCNC: 122 MG/DL (ref 74–99)
GLUCOSE BLD MANUAL STRIP-MCNC: 144 MG/DL (ref 74–99)
GLUCOSE BLD MANUAL STRIP-MCNC: 91 MG/DL (ref 74–99)
GLUCOSE BLD MANUAL STRIP-MCNC: 93 MG/DL (ref 74–99)
GLUCOSE SERPL-MCNC: 144 MG/DL (ref 74–99)
GLUCOSE SERPL-MCNC: 151 MG/DL (ref 74–99)
HCT VFR BLD AUTO: 33.8 % (ref 36–46)
HGB BLD-MCNC: 11.8 G/DL (ref 12–16)
HOLD SPECIMEN: NORMAL
IMM GRANULOCYTES # BLD AUTO: 0.02 X10*3/UL (ref 0–0.7)
IMM GRANULOCYTES NFR BLD AUTO: 0.3 % (ref 0–0.9)
LACTATE SERPL-SCNC: 1.2 MMOL/L (ref 0.4–2)
LYMPHOCYTES # BLD AUTO: 1.04 X10*3/UL (ref 1.2–4.8)
LYMPHOCYTES NFR BLD AUTO: 16.6 %
MAGNESIUM SERPL-MCNC: 2.65 MG/DL (ref 1.6–2.4)
MCH RBC QN AUTO: 33.7 PG (ref 26–34)
MCHC RBC AUTO-ENTMCNC: 34.9 G/DL (ref 32–36)
MCV RBC AUTO: 97 FL (ref 80–100)
MONOCYTES # BLD AUTO: 0.23 X10*3/UL (ref 0.1–1)
MONOCYTES NFR BLD AUTO: 3.7 %
NEUTROPHILS # BLD AUTO: 4.94 X10*3/UL (ref 1.2–7.7)
NEUTROPHILS NFR BLD AUTO: 79 %
NRBC BLD-RTO: 0 /100 WBCS (ref 0–0)
P AXIS: 71 DEGREES
P OFFSET: 173 MS
P ONSET: 122 MS
PLATELET # BLD AUTO: 56 X10*3/UL (ref 150–450)
POTASSIUM SERPL-SCNC: 5 MMOL/L (ref 3.5–5.3)
POTASSIUM SERPL-SCNC: 5.3 MMOL/L (ref 3.5–5.3)
PR INTERVAL: 196 MS
PROT SERPL-MCNC: 6 G/DL (ref 6.4–8.2)
Q ONSET: 220 MS
QRS COUNT: 11 BEATS
QRS DURATION: 82 MS
QT INTERVAL: 484 MS
QTC CALCULATION(BAZETT): 511 MS
QTC FREDERICIA: 502 MS
R AXIS: 51 DEGREES
RBC # BLD AUTO: 3.5 X10*6/UL (ref 4–5.2)
SODIUM SERPL-SCNC: 140 MMOL/L (ref 136–145)
SODIUM SERPL-SCNC: 142 MMOL/L (ref 136–145)
T AXIS: 26 DEGREES
T OFFSET: 462 MS
VENTRICULAR RATE: 67 BPM
WBC # BLD AUTO: 6.3 X10*3/UL (ref 4.4–11.3)

## 2025-02-11 PROCEDURE — 94003 VENT MGMT INPAT SUBQ DAY: CPT

## 2025-02-11 PROCEDURE — 82374 ASSAY BLOOD CARBON DIOXIDE: CPT | Performed by: NURSE PRACTITIONER

## 2025-02-11 PROCEDURE — 2500000001 HC RX 250 WO HCPCS SELF ADMINISTERED DRUGS (ALT 637 FOR MEDICARE OP): Performed by: INTERNAL MEDICINE

## 2025-02-11 PROCEDURE — 83605 ASSAY OF LACTIC ACID: CPT | Performed by: NURSE PRACTITIONER

## 2025-02-11 PROCEDURE — 2500000004 HC RX 250 GENERAL PHARMACY W/ HCPCS (ALT 636 FOR OP/ED): Performed by: NURSE PRACTITIONER

## 2025-02-11 PROCEDURE — 2500000002 HC RX 250 W HCPCS SELF ADMINISTERED DRUGS (ALT 637 FOR MEDICARE OP, ALT 636 FOR OP/ED): Performed by: NURSE PRACTITIONER

## 2025-02-11 PROCEDURE — 36620 INSERTION CATHETER ARTERY: CPT | Performed by: INTERNAL MEDICINE

## 2025-02-11 PROCEDURE — 2020000001 HC ICU ROOM DAILY

## 2025-02-11 PROCEDURE — 82947 ASSAY GLUCOSE BLOOD QUANT: CPT

## 2025-02-11 PROCEDURE — 99291 CRITICAL CARE FIRST HOUR: CPT | Performed by: INTERNAL MEDICINE

## 2025-02-11 PROCEDURE — 02HV33Z INSERTION OF INFUSION DEVICE INTO SUPERIOR VENA CAVA, PERCUTANEOUS APPROACH: ICD-10-PCS | Performed by: INTERNAL MEDICINE

## 2025-02-11 PROCEDURE — 84075 ASSAY ALKALINE PHOSPHATASE: CPT | Performed by: INTERNAL MEDICINE

## 2025-02-11 PROCEDURE — 37799 UNLISTED PX VASCULAR SURGERY: CPT | Performed by: INTERNAL MEDICINE

## 2025-02-11 PROCEDURE — 36556 INSERT NON-TUNNEL CV CATH: CPT | Performed by: INTERNAL MEDICINE

## 2025-02-11 PROCEDURE — 83735 ASSAY OF MAGNESIUM: CPT | Performed by: INTERNAL MEDICINE

## 2025-02-11 PROCEDURE — 2500000005 HC RX 250 GENERAL PHARMACY W/O HCPCS: Performed by: INTERNAL MEDICINE

## 2025-02-11 PROCEDURE — 37799 UNLISTED PX VASCULAR SURGERY: CPT | Performed by: NURSE PRACTITIONER

## 2025-02-11 PROCEDURE — 2500000004 HC RX 250 GENERAL PHARMACY W/ HCPCS (ALT 636 FOR OP/ED): Performed by: INTERNAL MEDICINE

## 2025-02-11 PROCEDURE — 99222 1ST HOSP IP/OBS MODERATE 55: CPT

## 2025-02-11 PROCEDURE — 93306 TTE W/DOPPLER COMPLETE: CPT

## 2025-02-11 PROCEDURE — 92950 HEART/LUNG RESUSCITATION CPR: CPT | Performed by: INTERNAL MEDICINE

## 2025-02-11 PROCEDURE — 5A12012 PERFORMANCE OF CARDIAC OUTPUT, SINGLE, MANUAL: ICD-10-PCS | Performed by: INTERNAL MEDICINE

## 2025-02-11 PROCEDURE — 85025 COMPLETE CBC W/AUTO DIFF WBC: CPT | Performed by: INTERNAL MEDICINE

## 2025-02-11 PROCEDURE — 94681 O2 UPTK CO2 OUTP % O2 XTRC: CPT

## 2025-02-11 RX ORDER — FENTANYL CITRATE-0.9 % NACL/PF 10 MCG/ML
25-200 PLASTIC BAG, INJECTION (ML) INTRAVENOUS CONTINUOUS
Status: DISCONTINUED | OUTPATIENT
Start: 2025-02-11 | End: 2025-02-11

## 2025-02-11 RX ORDER — FAMOTIDINE 10 MG/ML
20 INJECTION, SOLUTION INTRAVENOUS DAILY
Status: DISCONTINUED | OUTPATIENT
Start: 2025-02-12 | End: 2025-02-12

## 2025-02-11 RX ORDER — PHENOBARBITAL SODIUM 65 MG/ML
65 INJECTION, SOLUTION INTRAMUSCULAR; INTRAVENOUS ONCE
Status: COMPLETED | OUTPATIENT
Start: 2025-02-11 | End: 2025-02-11

## 2025-02-11 RX ORDER — ASPIRIN 81 MG/1
81 TABLET ORAL DAILY
COMMUNITY

## 2025-02-11 RX ORDER — FAMOTIDINE 20 MG/1
20 TABLET, FILM COATED ORAL DAILY
Status: DISCONTINUED | OUTPATIENT
Start: 2025-02-12 | End: 2025-02-12

## 2025-02-11 RX ORDER — FELODIPINE 2.5 MG/1
2.5 TABLET, EXTENDED RELEASE ORAL DAILY
COMMUNITY

## 2025-02-11 RX ADMIN — VASOPRESSIN 0.03 UNITS/MIN: 0.2 INJECTION INTRAVENOUS at 03:30

## 2025-02-11 RX ADMIN — Medication 70 PERCENT: at 07:32

## 2025-02-11 RX ADMIN — PHENOBARBITAL SODIUM 65 MG: 65 INJECTION INTRAMUSCULAR at 11:08

## 2025-02-11 RX ADMIN — FAMOTIDINE 20 MG: 20 TABLET, FILM COATED ORAL at 08:27

## 2025-02-11 RX ADMIN — FENTANYL CITRATE 50 MCG/HR: 50 INJECTION, SOLUTION INTRAMUSCULAR; INTRAVENOUS at 03:25

## 2025-02-11 RX ADMIN — FENTANYL CITRATE 75 MCG/HR: 50 INJECTION, SOLUTION INTRAMUSCULAR; INTRAVENOUS at 04:34

## 2025-02-11 RX ADMIN — Medication 70 PERCENT: at 02:41

## 2025-02-11 RX ADMIN — LEVOTHYROXINE SODIUM 137 MCG: 0.14 TABLET ORAL at 08:27

## 2025-02-11 RX ADMIN — PSYLLIUM HUSK 1 PACKET: 3.4 POWDER ORAL at 08:27

## 2025-02-11 RX ADMIN — POLYETHYLENE GLYCOL 3350 17 G: 17 POWDER, FOR SOLUTION ORAL at 08:28

## 2025-02-11 RX ADMIN — PROPOFOL 35 MCG/KG/MIN: 10 INJECTION, EMULSION INTRAVENOUS at 20:22

## 2025-02-11 RX ADMIN — PROPOFOL 20 MCG/KG/MIN: 10 INJECTION, EMULSION INTRAVENOUS at 15:47

## 2025-02-11 RX ADMIN — PROPOFOL 10 MCG/KG/MIN: 10 INJECTION, EMULSION INTRAVENOUS at 03:30

## 2025-02-11 RX ADMIN — Medication 60 PERCENT: at 19:31

## 2025-02-11 ASSESSMENT — COGNITIVE AND FUNCTIONAL STATUS - GENERAL
PERSONAL GROOMING: A LOT
TURNING FROM BACK TO SIDE WHILE IN FLAT BAD: A LOT
DRESSING REGULAR UPPER BODY CLOTHING: A LOT
MOBILITY SCORE: 12
MOVING FROM LYING ON BACK TO SITTING ON SIDE OF FLAT BED WITH BEDRAILS: A LOT
DAILY ACTIVITIY SCORE: 12
DRESSING REGULAR LOWER BODY CLOTHING: A LOT
EATING MEALS: A LOT
HELP NEEDED FOR BATHING: A LOT
WALKING IN HOSPITAL ROOM: A LOT
CLIMB 3 TO 5 STEPS WITH RAILING: A LOT
MOVING TO AND FROM BED TO CHAIR: A LOT
TOILETING: A LOT
STANDING UP FROM CHAIR USING ARMS: A LOT

## 2025-02-11 ASSESSMENT — PAIN - FUNCTIONAL ASSESSMENT
PAIN_FUNCTIONAL_ASSESSMENT: CPOT (CRITICAL CARE PAIN OBSERVATION TOOL)

## 2025-02-11 NOTE — CONSULTS
Nutrition Initial Assessment Note    Reason for Assessment: Admission nursing screening    Pt admitted for:  Intentional drug overdose, initial encounter (Multi) [T50.902A]  Acute respiratory failure, unspecified whether with hypoxia or hypercapnia [J96.00]    MST triggered for unsure for weight loss.  Chart reviewed and attempted to visit pt however in pt care at time of attempt.  Pt currently intubated and sedated, trophic enteral feeds ordered.    Past Medical History:   Diagnosis Date    Anxiety     Cirrhosis (Multi)     Depression     Disease of thyroid gland     Hepatitis C     Spondylolysis, lumbar region      Results for orders placed or performed during the hospital encounter of 02/10/25 (from the past 24 hours)   Troponin, High Sensitivity, 1 Hour   Result Value Ref Range    Troponin I, High Sensitivity 4 0 - 13 ng/L   Magnesium   Result Value Ref Range    Magnesium 1.54 (L) 1.60 - 2.40 mg/dL   CBC and Auto Differential   Result Value Ref Range    WBC 9.8 4.4 - 11.3 x10*3/uL    nRBC 0.0 0.0 - 0.0 /100 WBCs    RBC 3.84 (L) 4.00 - 5.20 x10*6/uL    Hemoglobin 12.8 12.0 - 16.0 g/dL    Hematocrit 37.2 36.0 - 46.0 %    MCV 97 80 - 100 fL    MCH 33.3 26.0 - 34.0 pg    MCHC 34.4 32.0 - 36.0 g/dL    RDW 14.4 11.5 - 14.5 %    Platelets 77 (L) 150 - 450 x10*3/uL    Neutrophils % 38.2 40.0 - 80.0 %    Immature Granulocytes %, Automated 0.6 0.0 - 0.9 %    Lymphocytes % 52.6 13.0 - 44.0 %    Monocytes % 6.8 2.0 - 10.0 %    Eosinophils % 1.0 0.0 - 6.0 %    Basophils % 0.8 0.0 - 2.0 %    Neutrophils Absolute 3.73 1.20 - 7.70 x10*3/uL    Immature Granulocytes Absolute, Automated 0.06 0.00 - 0.70 x10*3/uL    Lymphocytes Absolute 5.13 (H) 1.20 - 4.80 x10*3/uL    Monocytes Absolute 0.66 0.10 - 1.00 x10*3/uL    Eosinophils Absolute 0.10 0.00 - 0.70 x10*3/uL    Basophils Absolute 0.08 0.00 - 0.10 x10*3/uL   Basic Metabolic Panel   Result Value Ref Range    Glucose 146 (H) 74 - 99 mg/dL    Sodium 146 (H) 136 - 145 mmol/L     Potassium 3.1 (L) 3.5 - 5.3 mmol/L    Chloride 108 (H) 98 - 107 mmol/L    Bicarbonate 28 21 - 32 mmol/L    Anion Gap 13 10 - 20 mmol/L    Urea Nitrogen 26 (H) 6 - 23 mg/dL    Creatinine 1.38 (H) 0.50 - 1.05 mg/dL    eGFR 42 (L) >60 mL/min/1.73m*2    Calcium 9.3 8.6 - 10.3 mg/dL   Gray Top   Result Value Ref Range    Extra Tube Hold for add-ons.    Light Blue Top   Result Value Ref Range    Extra Tube Hold for add-ons.    SST TOP   Result Value Ref Range    Extra Tube Hold for add-ons.    Blood Gas Arterial Full Panel   Result Value Ref Range    POCT pH, Arterial 7.44 (H) 7.38 - 7.42 pH    POCT pCO2, Arterial 36 (L) 38 - 42 mm Hg    POCT pO2, Arterial 130 (H) 85 - 95 mm Hg    POCT SO2, Arterial 99 94 - 100 %    POCT Oxy Hemoglobin, Arterial 96.4 94.0 - 98.0 %    POCT Hematocrit Calculated, Arterial 41.0 36.0 - 46.0 %    POCT Sodium, Arterial 143 136 - 145 mmol/L    POCT Potassium, Arterial 3.1 (L) 3.5 - 5.3 mmol/L    POCT Chloride, Arterial 110 (H) 98 - 107 mmol/L    POCT Ionized Calcium, Arterial 1.20 1.10 - 1.33 mmol/L    POCT Glucose, Arterial 159 (H) 74 - 99 mg/dL    POCT Lactate, Arterial 3.3 (H) 0.4 - 2.0 mmol/L    POCT Base Excess, Arterial 0.6 -2.0 - 3.0 mmol/L    POCT HCO3 Calculated, Arterial 24.5 22.0 - 26.0 mmol/L    POCT Hemoglobin, Arterial 13.8 12.0 - 16.0 g/dL    POCT Anion Gap, Arterial 12 10 - 25 mmo/L    Patient Temperature 37.0 degrees Celsius    FiO2 100 %   POCT GLUCOSE   Result Value Ref Range    POCT Glucose 157 (H) 74 - 99 mg/dL   ECG 12 lead   Result Value Ref Range    Ventricular Rate 67 BPM    Atrial Rate 67 BPM    WY Interval 196 ms    QRS Duration 82 ms    QT Interval 484 ms    QTC Calculation(Bazett) 511 ms    P Axis 71 degrees    R Axis 51 degrees    T Axis 26 degrees    QRS Count 11 beats    Q Onset 220 ms    P Onset 122 ms    P Offset 173 ms    T Offset 462 ms    QTC Fredericia 502 ms   POCT GLUCOSE   Result Value Ref Range    POCT Glucose 156 (H) 74 - 99 mg/dL   Basic metabolic  panel   Result Value Ref Range    Glucose 151 (H) 74 - 99 mg/dL    Sodium 140 136 - 145 mmol/L    Potassium 5.0 3.5 - 5.3 mmol/L    Chloride 113 (H) 98 - 107 mmol/L    Bicarbonate 24 21 - 32 mmol/L    Anion Gap 8 (L) 10 - 20 mmol/L    Urea Nitrogen 28 (H) 6 - 23 mg/dL    Creatinine 1.41 (H) 0.50 - 1.05 mg/dL    eGFR 41 (L) >60 mL/min/1.73m*2    Calcium 8.9 8.6 - 10.3 mg/dL   POCT GLUCOSE   Result Value Ref Range    POCT Glucose 144 (H) 74 - 99 mg/dL   CBC and Auto Differential   Result Value Ref Range    WBC 6.3 4.4 - 11.3 x10*3/uL    nRBC 0.0 0.0 - 0.0 /100 WBCs    RBC 3.50 (L) 4.00 - 5.20 x10*6/uL    Hemoglobin 11.8 (L) 12.0 - 16.0 g/dL    Hematocrit 33.8 (L) 36.0 - 46.0 %    MCV 97 80 - 100 fL    MCH 33.7 26.0 - 34.0 pg    MCHC 34.9 32.0 - 36.0 g/dL    RDW 14.2 11.5 - 14.5 %    Platelets 56 (L) 150 - 450 x10*3/uL    Neutrophils % 79.0 40.0 - 80.0 %    Immature Granulocytes %, Automated 0.3 0.0 - 0.9 %    Lymphocytes % 16.6 13.0 - 44.0 %    Monocytes % 3.7 2.0 - 10.0 %    Eosinophils % 0.2 0.0 - 6.0 %    Basophils % 0.2 0.0 - 2.0 %    Neutrophils Absolute 4.94 1.20 - 7.70 x10*3/uL    Immature Granulocytes Absolute, Automated 0.02 0.00 - 0.70 x10*3/uL    Lymphocytes Absolute 1.04 (L) 1.20 - 4.80 x10*3/uL    Monocytes Absolute 0.23 0.10 - 1.00 x10*3/uL    Eosinophils Absolute 0.01 0.00 - 0.70 x10*3/uL    Basophils Absolute 0.01 0.00 - 0.10 x10*3/uL   Comprehensive Metabolic Panel   Result Value Ref Range    Glucose 144 (H) 74 - 99 mg/dL    Sodium 142 136 - 145 mmol/L    Potassium 5.3 3.5 - 5.3 mmol/L    Chloride 114 (H) 98 - 107 mmol/L    Bicarbonate 22 21 - 32 mmol/L    Anion Gap 11 10 - 20 mmol/L    Urea Nitrogen 28 (H) 6 - 23 mg/dL    Creatinine 1.44 (H) 0.50 - 1.05 mg/dL    eGFR 40 (L) >60 mL/min/1.73m*2    Calcium 8.7 8.6 - 10.3 mg/dL    Albumin 2.8 (L) 3.4 - 5.0 g/dL    Alkaline Phosphatase 100 33 - 136 U/L    Total Protein 6.0 (L) 6.4 - 8.2 g/dL    AST 47 (H) 9 - 39 U/L    Bilirubin, Total 1.2 0.0 - 1.2 mg/dL     ALT 36 7 - 45 U/L   Magnesium   Result Value Ref Range    Magnesium 2.65 (H) 1.60 - 2.40 mg/dL   POCT GLUCOSE   Result Value Ref Range    POCT Glucose 122 (H) 74 - 99 mg/dL   POCT GLUCOSE   Result Value Ref Range    POCT Glucose 112 (H) 74 - 99 mg/dL   POCT GLUCOSE   Result Value Ref Range    POCT Glucose 109 (H) 74 - 99 mg/dL   Transthoracic Echo (TTE) Complete   Result Value Ref Range    BSA 2.11 m2     Scheduled medications  [Held by provider] aspirin, 81 mg, oral, Daily  [Held by provider] enoxaparin, 40 mg, subcutaneous, q24h  [START ON 2/12/2025] famotidine, 20 mg, oral, Daily   Or  [START ON 2/12/2025] famotidine, 20 mg, intravenous, Daily  [Held by provider] gabapentin, 400 mg, oral, TID  [Held by provider] lisinopril, 20 mg, oral, Daily   And  [Held by provider] hydroCHLOROthiazide, 12.5 mg, oral, Daily  levothyroxine, 137 mcg, oral, Daily  ondansetron, 4 mg, intravenous, Once  polyethylene glycol, 17 g, oral, Daily  psyllium, 1 packet, oral, Daily      Continuous medications  lactated Ringer's, 75 mL/hr, Last Rate: 75 mL/hr (02/10/25 1935)  norepinephrine, 0-0.5 mcg/kg/min (Dosing Weight), Last Rate: 0.05 mcg/kg/min (02/11/25 1515)  propofol, 0-50 mcg/kg/min, Last Rate: 20 mcg/kg/min (02/11/25 1547)      PRN medications  PRN medications: [Held by provider] ALPRAZolam, oxygen  Dietary Orders (From admission, onward)       Start     Ordered    02/11/25 0914  Enteral feeding with NPO 10; 150; Water; Every 6 hours  Diet effective now        Question Answer Comment   Tube feeding formula: Jevity 1.5    Tube feeding continuous rate (mL/hr): 10    Tube feeding flush (mL): 150    Flush type: Water    Flush frequency: Every 6 hours        02/11/25 0913    02/10/25 2216  May Not Participate in Room Service  ( ROOM SERVICE MAY NOT PARTICIPATE)  Once        Question:  .  Answer:  Yes    02/10/25 2215                    Intake/Output Summary (Last 24 hours) at 2/11/2025 1632  Last data filed at 2/11/2025  "1500  Gross per 24 hour   Intake --   Output 620 ml   Net -620 ml       History:  Food and Nutrient History: Jevity 1.5, trophic feed 10ml/hr 150ml h2o flush x 4    Anthropometrics:  Height: 165.1 cm (5' 5\")  Weight: 97 kg (213 lb 13.5 oz)  BMI (Calculated): 35.59    Weight History / % Weight Change: 94.6kg 1/29/25, 97.0kg 1/2/25, 96.6kg 8/13/24, 92.1kg 6/5/24  Significant Weight Loss: No    Energy Needs:  Height: 165.1 cm (5' 5\")  Minute Ventilation (L/min): 7.34 L/min  Temp: 37.4 °C (99.3 °F)    Total Energy Estimated Needs in 24 hours (kCal): 1250 kCal  Energy Estimated Needs per kg Body Weight in 24 hours (kCal/kg): 1420 kCal/kg  Method for Estimating Needs: 22-25 IBW    Total Protein Estimated Needs in 24 Hours (g): 55 g  Protein Estimated Needs per kg Body Weight in 24 Hours (g/kg): 115 g/kg  Method for Estimating 24 Hour Protein Needs: 1.0-2.0 IBW    Method for Estimating 24 Hour Fluid Needs: 1ml/kcal or per MD    Nutrition Focused Physical Findings:  Defer Subcutaneous Fat Loss Assessment: Defer all  Defer All Reason: Pt unavailable. Will attempt upon follow up.    Defer Muscle Wasting Assessment: Defer all  Defer All Reason: Pt unavailable. Will attempt upon follow up.    Edema: +2 mild  Edema Location: LUE, RLE, LLE    Skin: Negative  Respiratory : Positive (intubated)     Nutrition Diagnosis   Patient has Nutrition Diagnosis: Yes  Nutrition Diagnosis 1: Inadequate oral intake  Diagnosis Status (1): New  Related to (1): acute illness  As Evidenced by (1): pt NPO requiring enteral feeds to meet nutritional needs       Nutrition Interventions/Recommendations   Food and/or Nutrient Delivery Interventions  Enteral Intake: Management of volume of enteral nutrition, Management of delivery rate of enteral nutrition, Management of flushing of feeding tube  Goal: Jevity 1.5, goal rate 20ml/hr would provide 720kcal, 30.6g protein & 365ml free h2o. Flushes per MD rec ( current order 150 x 4). Propofol currently at " 11.16ml/hr and would provide an additional 294.6kcal per 24 hrs. Formula at goal rate with propofol would meet 75% of pts nutritional needs.    Collaboration and Referral of Nutrition Care: Collaboration by nutrition professional with other providers  Coordination of Care with Providers: Nursing, Provider    Education Documentation  No documentation found.      Nutrition Monitoring and Evaluation   Food and Nutrient Related History  Enteral and Parenteral Nutrition Intake Determination: Enteral nutrition intake - Tolerate TF at goal rate, Enteral nutrition intake - To meet > 75% estimated energy needs, Enteral nutrition formula/solution, Enteral nutrition intake - Other  Criteria: Monitor TF tolerance; Contact nutrition services for intolerances    Anthropometrics: Body Composition/Growth/Weight History  Body Weight: Body weight - Maintain stable weight, Body weight - Weight reduction from fluids, as needed    Biochemical Data, Medical Tests and Procedures  Electrolyte and Renal Panel: Other (Comment)  Criteria: as clinically indicated    Gastrointestinal Profile: Other (Comment)  Criteria: as clinically indicated    Glucose/Endocrine Profile: Other (Comment)  Criteria: as clinically indicated    Nutritional Anemia Profile: Other (Comment)  Criteria: as clinically indicated    Vitamin Profile: Other (Comment)  Criteria: as clinically indicated    Nutrition Focused Physical Findings  Digestive System Finding: Other (Comment)  Criteria: Stool output, Urine volume, Overall appearance    Edema Finding: +2 Pitting edema    Time Spent (min): 60 minutes  Last Date of Nutrition Visit: 02/11/25  Nutrition Follow-Up Needed?: Dietitian to reassess per policy  Follow up Comment: NILAY ANTONIO   no

## 2025-02-11 NOTE — PROGRESS NOTES
Critical Care Medicine Progress Note    Impression/Plan:Rashmi Oreilly is a 70 y/o f pmhx of HCV cirrhosis, OA s/p Left SANDI, ICD placement for VT, depression, anxiety on Alprazolam at home, who is admitted to the ICU with acute respiratory failure with hypoxemia d/t aspiration (most likely) in the setting of airway compromise as a consequence of toxic encephalopathy d/t cocaine, fentanyl with likely less contribution from benzodiazepines as patient takes Alprazolam at home.     Timeline:   2/10 - cardiopulmonary arrest, manifesting as PEA. During ACLS, patient was noted to have VT x 2 prompting shock. Amiodarone bolus given x 1, along with epinephrine per ACLS guidelines. Family notified. Vasopressors have since been weaned. Of note, on further investigation into patient's chart, patient did not follow-up with her cardiologist as advised for AICD up-grade.     On-going issues include the following:   Encephalopathy - multifactorial d/t polysuybstance overdose (cocaine and fentanyl most likely - POA)) coupled with anoxic ischemic component d/t PEA arrest with subsequent VT during ACLS resuscitation (~10 min). Patient resists eye opening on exam. She has begun to follow commands and answer questions appropriately since initial exam at 0830.   Acute respiratory failure d/t air-way compromise (POA) - remains on vent support  Aspiration pneumonitis d/t polysubstance overdose  Hypokalemia, hypomagnesemia - replaced           Neuro  - A-F bundle   - Sleep Hygiene measures: appropriate daytime stimulation/physical activity. Lights out at 2100, avoidance of night time lab draws/night baths, avoidance of delirium provoking medications  - maintain goal Temp </= 37C  - secondary neuro protective measures: avoidance of fever, seizure, arrhythmia, hypoxemia/hyperoxia, hyper/hypocapnia ect.   - Sedation: propofol gtt titrated to goal RASS 0 to -2   - phenobarb 65 mg IV x 1 to facilitate propofol weaning while addressing  anxiety/benzodiazepine dependence   - serial neuro exams off propofol         CV  - AICD interrogation pending   - goal MAP >/= 65 mm hg            Pulm:   - Vent support: adjust settings as needed to maintain SpO2 > 88%, pH >7.25, Ppl < 30 cm H2O while optimizing patient vent synchrony   - will wean FiO2 and once able to achieve SpO2 goals at 40% will transition to PSV mode        Renal/electrolyte/acid-base:   - avoid nephrotoxins as much as possible   - replace electrolytes as indicated   - maintain armendariz for accurate I/O      GI/Nutrition   - TF trophic feeds for today  - GI ppx: H2B     ID  - nil      Heme   - DVT ppx: SCD's     Endocrine  - goal serum glucose 140-180 mg/dL      Musculoskeletal/skin  - skin protective measures   - PT/OT when able      Family contact:  Margaret Keyur (mother) and Johnson ()         Critical Care time: 50 min                Interval History: see above            Objective   Vitals:  Most Recent:  Vitals:    02/11/25 0732   BP:    Pulse:    Resp: 18   Temp:    SpO2: 95%       24hr Min/Max:  Temp  Min: 34.5 °C (94.1 °F)  Max: 37.2 °C (99 °F)  Pulse  Min: 51  Max: 143  BP  Min: 89/76  Max: 178/114  Resp  Min: 8  Max: 31  SpO2  Min: 91 %  Max: 100 %    LDA:  CVC 02/10/25 Triple lumen Right Femoral (Active)   Placement Date/Time: 02/10/25 1910   Hand Hygiene Performed Prior to CVC Insertion: Yes  Site Prep: Chlorhexidine   Site Prep Agent has Completely Dried Before Insertion: Yes  All 5 Sterile Barriers Used (Gloves, Gown, Cap, Mask, Large Sterile Drape):...   Number of days: 0       Arterial Line 02/10/25 Right Femoral (Active)   Placement Date/Time: 02/10/25 1910   Hand Hygiene Completed: Yes  Orientation: Right  Location: Femoral  Site Prep: Chlorhexidine   Local Anesthetic: Injectable   Number of days: 0       ETT  7.5 mm (Active)   Placement Date/Time: 02/10/25 1541   Single Lumen Tube Size: 7.5 mm  Laryngoscope: Sarthak  Location: Oral  Airway Insertion Attempts: 2   "Placement Verification: Auscultation;Symmetrical chest wall movement;Capnometry   Number of days: 0       Urethral Catheter 16 Fr. (Active)   Placement Date/Time: 02/10/25 1547   Placed by: sabrina  Tube Size (Fr.): 16 Fr.  Catheter Balloon Size: 10 mL   Number of days: 0       NG/OG/Feeding Tube 16 Fr Center mouth (Active)   Placement Date/Time: 02/10/25 1545   Placed by: sabrina  Type of Tube: NG/OG Tube  Tube Length: 55 cm  NG/OG Tube Size: 16 Fr  Tube Location: Center mouth   Number of days: 0             Hemodynamic parameters for last 24 hours:       I/O:  I/O last 2 completed shifts:  In: - (0 mL/kg)   Out: 375 (3.9 mL/kg) [Urine:375 (0.2 mL/kg/hr)]  Weight: 97 kg     Physical Exam:   /81   Pulse 54   Temp 37.1 °C (98.8 °F)   Resp 18   Ht 1.651 m (5' 5\")   Wt 97 kg (213 lb 14.4 oz)   SpO2 95%   BMI 35.59 kg/m²     Gen: intubated, calm, NAD  Neuro: resists eye-opening, and turns head away from me, does not follow my commands - she has begun to follow commands as of 0915  CV: s1,s2  Pulm: CTAB  Abd: obese, soft, non-tender   Ext: well perfused, cap refill < 3 sec  Skin: chronic venous stasis changes noted to lower legs b/l, no mottling              "

## 2025-02-11 NOTE — PROCEDURES
"CENTRAL LINE INSERTION    Site: Right femoral vein      Pre-procedure diagnosis: Shock  Post-procedure diagnosis: Same  Indication(s):  Administration of caustic agent(s), Monitor CVP or access central venous blood to guide therapy, and Inadequate IV access  Performed by:  Me  Assistant(s):  Alvarado Angel  EBL:   Min    Consent obtained: Emergent  Patient identified: Yes  Timeout performed: Emergent  Sedation / analgesia: None  Sterility:   Hat & mask on myself and assistant(s).  Site prepped with 2% CHG and 70% IPA solution using applicator.  Sterile gloves, gown, and drape.  Landmarks identified: Yes  Ultrasound guided: No.  Local anesthetic: None.    Vein punctured with echogenic 18 Ga. x 2.5\" XTW introducer needle.  Supplied 0.032\" diameter spring guidewire thread easily through introducer needle and needle removed.  Small skin incision made adjacent to guidewire using #11 scalpel.  Site dilated using 8.5 Fr tissue dilator over guidewire.  7 Fr x 16 cm triple lumen catheter then thready easily over guidewire and guidewire removed.  Ports aspirated to remove air, then flushed with sterile NS.  Catheter secured with suture x 2 and CHG dressing.    No complications.  Other details:  Procedure done alon arrest    ARROW Pressure Injectable Arrowg+frederic Blue Plus  Three-Lumen CVC Kit      Lissa Grayson DO  "

## 2025-02-11 NOTE — H&P (VIEW-ONLY)
Inpatient consult to Cardiology  Consult performed by: Fernandez Dorantes, SAGRARIO-CNP  Consult ordered by: SAGRARIO Barba-CNP  Reason for consult: AICD interrogation s/p brief cardiac arrest in ICU. Hx of cardiac arrest in 2010.        History Of Present Illness:    Rashmi Oreilly is a 69 y.o. female with past medical history significant for Cardiac arrest secondary to ventricular fibrillation  in setting of electrolyte abnormalities  s/p  [facility] placement 4/1/2010,  Hypertension, Hyperlipidemia,  Chronic hepatitis C with cirrhosis, Hypothyroidism s/p thyroidectomy for papillary thyroid cancer, Anxiety/depression, Panic disorder, Lyme disease 1995, Osteoarthritis s/p total knee arthroplasty, Drug and alcohol use. Presented with mental status change/overdose. Cardiology is consulted for AICD interrogation s/p brief cardiac arrest in ICU. Hx of cardiac arrest in 2010.     Patient intubated/sedated. No family at the bedside. Information obtained from chart review. According to documentation, patient snorted a white powder from a friend and became unresponsive. Brought to [facility] ED.  She was treated with naloxone 8 mg with no results.  CT head showed no evidence of acute intracranial pathology. High sensitivity troponin negative x 2.   K 4.2 BUN 26 creatinine 1.33 magnesium 1.48 TSH 1.16 Flu and covid negative initial VBG PH 7.45 Pco2 37 bicarb 25.7 lactate 1.3 ammonia 47 Urine toxicology positive for cocaine, benzodiazepines and fentanyl. She was intubated in ED  for airway protection and admitted to ICU.  While in the ICU, she sustained a cardiac  arrest.  Per documentation, she bertha down and became pulseless. Also had VT.   CPR was performed for 6-10 minutes and treated with epinephrine, bicarbonate and amiodarone  with subsequent ROSC. She was started on levophed and vasopressin which have since been weaned off. Potassium after code 3.1 mag 1.5. ABG PH 7.44 PCO2 36 PO2 130 bicarb 24.5 lactate  3.3.  Received  total 6 g magnesium, total 60 meq potassium and IV fluids. She is currently intubated/sedated on propofol/fentanyl and IV fluids. She is hemodynamically stable.     Per documentation, patient sustained cardiac arrest secondary to VF in setting of electrolyte abnormalities in [place] in 2010 following admission for trauma and EtOH intoxication.  Left heart cath and echocardiogram at that time was normal.  She had a normal NM myocardial perfusion scan in 2018.  Per documentation in 2019, patient has signs of ICD fracture lead nearing NABEEL and it was deemed nonfunctional. Per device interrogation in Care Everywhere done  8/23/2018 patient did have VT detection which was treated successfully with ATP. She also had high atrial rated with EGMs showing AT/AFL with <1% burden.  She has had poor cardiology follow-up since her arrest.      Home CV meds: aspirin 81 mg daily, Lisinopril-hydrochlorothiazide 20-12.5 mg daily          Last Recorded Vitals:  Vitals:    02/11/25 0500 02/11/25 0515 02/11/25 0530 02/11/25 0545   BP:       Pulse: 56 52 51 54   Resp: 23 24 23 22   Temp: 37.1 °C (98.8 °F) 37.1 °C (98.8 °F) 37.1 °C (98.8 °F) 37.1 °C (98.8 °F)   TempSrc:       SpO2: 94% 94% 94% 94%   Weight:       Height:           Last Labs:  LABS:  CMP:  Results from last 7 days   Lab Units 02/11/25  0436 02/11/25  0147 02/10/25  1930 02/10/25  1722 02/10/25  1524   SODIUM mmol/L 142 140 146*  --  141   POTASSIUM mmol/L 5.3 5.0 3.1*  --  4.2   CHLORIDE mmol/L 114* 113* 108*  --  111*   CO2 mmol/L 22 24 28  --  23   ANION GAP mmol/L 11 8* 13  --  11   BUN mg/dL 28* 28* 26*  --  26*   CREATININE mg/dL 1.44* 1.41* 1.38*  --  1.33*   EGFR mL/min/1.73m*2 40* 41* 42*  --  44*   MAGNESIUM mg/dL  --   --   --  1.54* 1.48*   ALBUMIN g/dL 2.8*  --   --   --  3.2*   ALT U/L 36  --   --   --  37   AST U/L 47*  --   --   --  51*   BILIRUBIN TOTAL mg/dL 1.2  --   --   --  1.2     CBC:  Results from last 7 days   Lab Units  "02/11/25  0436 02/10/25  1930 02/10/25  1521   WBC AUTO x10*3/uL 6.3 9.8 3.9*   HEMOGLOBIN g/dL 11.8* 12.8 9.0*   HEMATOCRIT % 33.8* 37.2 27.9*   PLATELETS AUTO x10*3/uL 56* 77* 51*   MCV fL 97 97 104*     COAG:   Results from last 7 days   Lab Units 02/10/25  1524   INR  1.4*     ABO: No results found for: \"ABO\"  HEME/ENDO:  Results from last 7 days   Lab Units 02/10/25  1524   TSH mIU/L 1.16      CARDIAC:   Results from last 7 days   Lab Units 02/10/25  1722 02/10/25  1524 02/10/25  1521   TROPHS ng/L 4 4  --    BNP pg/mL  --   --  111*   No results for input(s): \"CHOL\", \"LDLF\", \"LDL\", \"LDLCALC\", \"HDL\", \"TRIG\" in the last 90482 hours. '    Imagine Results  XR chest abdomen for OG NG placement   Final Result   1.ET tube in place with the tip about 2.0 cm above the level of   the seven. Enteric tube in place with the tip in the right midabdomen   and could be in the gastric antrum or proximal duodenum.   2.Relatively gasless gastrointestinal pattern with no overt   evidence of bowel obstruction.   3.Bibasilar subsegmental atelectasis or scarring with possible   nodule in the right upper lobe versus artifact and possible nodule   versus scarring or atelectasis in the left base. Attention to these on   follow-up is suggested.   Signed by Amy Noe DO      CT head wo IV contrast   Final Result   No CT evidence for acute intracranial pathology.        Signed by: Blaine Perez 2/10/2025 5:49 PM   Dictation workstation:   [id number]      XR chest abdomen for OG NG placement   Final Result   1. Mild opacification of the left lung base may reflect atelectasis   or small infiltrate.   2. The enteric tube distal tip projects at the expected location of   the gastric body.   3. The endotracheal tube distal tip projects 5.3 cm above the seven.        MACRO:   None        Signed by: Rachael James 2/10/2025 4:28 PM   Dictation workstation:   [id number]      Transthoracic Echo (TTE) Complete    (Results Pending)       "   Last I/O:  I/O last 3 completed shifts:  In: - (0 mL/kg)   Out: 375 (3.9 mL/kg) [Urine:375 (0.1 mL/kg/hr)]  Weight: 97 kg     Past Cardiology Tests (Last 3 Years):  EKG:  ECG 12 lead 02/10/2025          Echo:  4/10/2023- [facility]   - Technically difficult exam due to suboptimal positioning, body habitus and patient experiencing rib,back and hip pain.   - Exam indication: Palpitations; Edema   - The left ventricle is small. Left ventricular systolic function is normal. EF = 63 ± 5% (2D 4-ch.) Grade I left ventricular diastolic dysfunction.   - The right ventricle is normal in size. Right ventricular systolic function is normal.   - The right atrial cavity is dilated.   - There is moderate (2+ - 3+) tricuspid valve regurgitation.   - Estimated right ventricular systolic pressure is 35 mmHg consistent with normal pulmonary artery pressures. Estimated right atrial pressure is 8 mmHg (although IVC not seen).   - Exam was compared with the prior CC echocardiographic exam performed on  9/6/2018.      9/6/2018- [facility]   CONCLUSIONS:   - Exam indication: Syncope; SOB   - The left ventricle is normal in size. Left ventricular systolic function is normal. EF = 55 ± 5% (2D biplane)   - The right ventricle is normal in size. Right ventricular systolic function is normal.   - There is moderately severe (3+) tricuspid valve regurgitation.   - The patient has not had a prior CC echocardiographic exam for comparison.       Cath:  Per documentation, left heart cath in 2010 in [place] was negative for CAD    Stress Test:  NM Cardiac Perf Stress/Pharm 11/21/2018   1. SPECT Perfusion Study: Normal.    2. There is no scintigraphic evidence for inducible ischemia.    3. No evidence of scarred myocardium.    4. Functional capacity N/A (pharmacological).    5. Left ventricle is normal in size. The left ventricle systolic function is normal.    6. Right ventricle is normal in size. The right ventricle systolic function  is normal.    7. This is a low risk scan.       Cardiac Imaging:        Past Medical History:  She has a past medical history of Anxiety, Cirrhosis (Multi), Depression, Disease of thyroid gland, Hepatitis C, and Spondylolysis, lumbar region.    Past Surgical History:  She has a past surgical history that includes Total hip arthroplasty and pacemaker placement.      Social History:  She reports that she has been smoking cigarettes. She has a 6 pack-year smoking history. She has been exposed to tobacco smoke. She has never used smokeless tobacco. She reports that she does not drink alcohol and does not use drugs.    Family History:  No family history on file.     Allergies:  Cephalexin    Inpatient Medications:  Scheduled medications   Medication Dose Route Frequency    [Held by provider] aspirin  81 mg oral Daily    [Held by provider] enoxaparin  40 mg subcutaneous q24h    famotidine  20 mg oral BID    Or    famotidine  20 mg intravenous BID    [Held by provider] gabapentin  400 mg oral TID    [Held by provider] lisinopril  20 mg oral Daily    And    [Held by provider] hydroCHLOROthiazide  12.5 mg oral Daily    levothyroxine  137 mcg oral Daily    ondansetron  4 mg intravenous Once    polyethylene glycol  17 g oral Daily    psyllium  1 packet oral Daily     PRN medications   Medication    [Held by provider] ALPRAZolam    oxygen     Continuous Medications   Medication Dose Last Rate    fentaNYL   mcg/hr 75 mcg/hr (02/11/25 0434)    lactated Ringer's  75 mL/hr 75 mL/hr (02/10/25 1935)    norepinephrine  0-0.5 mcg/kg/min (Dosing Weight) Stopped (02/10/25 1422)    propofol  0-50 mcg/kg/min 10 mcg/kg/min (02/11/25 0330)    vasopressin  0-0.03 Units/min Stopped (02/11/25 0603)     Outpatient Medications:  Current Outpatient Medications   Medication Instructions    ALPRAZolam (XANAX) 0.5 mg, oral, 2 times daily PRN    aspirin 81 mg, oral, 2 times daily    ergocalciferol (VITAMIN D-2) 100,000 Units, oral, Weekly     gabapentin (NEURONTIN) 800 mg, oral, 3 times daily    levothyroxine (SYNTHROID, LEVOXYL) 137 mcg, oral, Daily, 3 tablets once per day    lisinopriL-hydrochlorothiazide 20-12.5 mg tablet 1 tablet, oral, Daily       Physical Exam:  GENERAL: Intubated/sedated  SKIN: warm, dry  CARDIAC: Regular rate and rhythm no murmurs  PULMONARY: Normal respiratory efforts with ventilator, lungs clear to auscultation bilaterally.  ABDOMEN: soft, nondistended  EXTREMITIES: no lower extremity edema  NEURO: Intubated/sedated    I reviewed EKGs, labs and all imaging reports  I reviewed telemetry which showed       Assessment/Plan   Rashmi Oreilly is a 69 y.o. female with past medical history significant for Cardiac arrest secondary to ventricular fibrillation  in setting of electrolyte abnormalities  s/p  [facility] placement 4/1/2010,  Hypertension, Hyperlipidemia,  Chronic hepatitis C with cirrhosis, Hypothyroidism s/p thyroidectomy for papillary thyroid cancer, Anxiety/depression, Panic disorder, Lyme disease 1995, Osteoarthritis s/p total knee arthroplasty, Drug and alcohol use. Presented with mental status change/overdose. Cardiology is consulted for AICD interrogation s/p brief cardiac arrest in ICU. Hx of cardiac arrest in 2010.     Home CV meds: aspirin 81 mg daily, Lisinopril-hydrochlorothiazide 20-12.5 mg daily     #Cardiac arrest   Initial rhythm asystole. Also had VT requiring shock. Treated with epinephrine and amiodarone.  Per documentation, her AICD was NABEEL in 2018. AICD was initially placed 2010 after VF  arrest  in setting of electrolyte abnormalities.  No documentation of generator change out. Will contact [facility] representative to obtain further records  - We will obtain a transthoracic echocardiogram for structural evaluation including ejection fraction, assessment of regional wall motion abnormalities or valvular disease, and further evaluation of hemodynamics  -Plan to consult EP pending neurological  recovery.     -Keep K >4 Mag >2. Continue to monitor on telemetry.     Code Status:  Full Code      Fernandez Dorantes, APRN-CNP    ==========================  Attending note  ==========================  Both the MARIA VICTORIA and I have had a face to face encounter with the patient today. I have examined the patient and edited the documented physical examination as necessary.  I personally reviewed the patient's recent labs, medications, orders, EKGs, and pertinent cardiac imaging.  I have reviewed the MARIA VICTORIA's encounter note, approve the MARIA VICTORIA's documentation and have edited the note to reflect the diagnostic and therapeutic plan.    69-year-old female with a history of cardiac arrest secondary to ventricular fibrillation (2010) due to electrolyte imbalances, s/p [facility] AICD placement, hypertension, hyperlipidemia, chronic hepatitis C with cirrhosis, hypothyroidism s/p thyroidectomy for papillary thyroid cancer, anxiety/depression, panic disorder, Lyme disease (1995), and drug/alcohol use, presented with altered mental status following overdose. She reportedly snorted a white powder, became unresponsive, and was intubated in the ED for airway protection after no response to naloxone. Urine tox screen revealed cocaine, benzodiazepines, and fentanyl. While in the ICU, she experienced cardiac arrest with documented bradycardia, pulselessness, and VT, requiring CPR, epinephrine, bicarbonate, and amiodarone, with subsequent ROSC. Electrolyte correction included magnesium and potassium repletion, with current stable hemodynamics on sedation (propofol/fentanyl) and IV fluids. Past cardiac workup included normal left heart cath and echo (2010) and NM perfusion scan (2018). ICD interrogation (2018) showed prior VT successfully treated with ATP and AT/AFL with minimal burden, though the device had a near-NABEEL fractured lead, with poor cardiology follow-up since.  We will Contact [facility] representative to obtain further AICD records,  given that her device was at NABEEL in 2018 with no documentation of generator change. Also obtain transthoracic echocardiogram to assess ejection fraction, regional wall motion abnormalities, valvular disease, and hemodynamics. Pending neurological recovery, consult EP. Maintain potassium >4 and magnesium >2, and continue monitoring on telemetry.    Past medical history:  As above.    Medications were reviewed.    Allergies were reviewed.    Vital signs, telemetry, medications, labs, and imaging were reviewed as well.    Physical Exam:  GENERAL: Intubated/sedated  SKIN: warm, dry  CARDIAC: Regular rate and rhythm no murmurs  PULMONARY: Normal respiratory efforts with ventilator, lungs clear to auscultation bilaterally.  ABDOMEN: soft, nondistended  EXTREMITIES: no lower extremity edema  NEURO: Intubated/sedated    I reviewed EKGs, labs and all imaging reports  I reviewed telemetry which showed       Assessment/Plan   Rashmi Oreilly is a 69 y.o. female with past medical history significant for Cardiac arrest secondary to ventricular fibrillation  in setting of electrolyte abnormalities  s/p  [facility] placement 4/1/2010,  Hypertension, Hyperlipidemia,  Chronic hepatitis C with cirrhosis, Hypothyroidism s/p thyroidectomy for papillary thyroid cancer, Anxiety/depression, Panic disorder, Lyme disease 1995, Osteoarthritis s/p total knee arthroplasty, Drug and alcohol use. Presented with mental status change/overdose. Cardiology is consulted for AICD interrogation s/p brief cardiac arrest in ICU. Hx of cardiac arrest in 2010.     Home CV meds: aspirin 81 mg daily, Lisinopril-hydrochlorothiazide 20-12.5 mg daily     #Cardiac arrest   Initial rhythm asystole. Also had VT requiring shock. Treated with epinephrine and amiodarone.  Per documentation, her AICD was NABEEL in 2018. AICD was initially placed 2010 after VF  arrest  in setting of electrolyte abnormalities.  No documentation of generator change out. Will contact  [facility] representative to obtain further records  - We will obtain a transthoracic echocardiogram for structural evaluation including ejection fraction, assessment of regional wall motion abnormalities or valvular disease, and further evaluation of hemodynamics  -Plan to consult EP pending neurological recovery.     -Keep K >4 Mag >2. Continue to monitor on telemetry.     Sd Young MD

## 2025-02-11 NOTE — PROGRESS NOTES
Pharmacy Medication History Review    Rashmi Oreilly is a 69 y.o. female admitted for Intentional drug overdose, initial encounter (Multi). Pharmacy reviewed the patient's [id number] medications and allergies for accuracy.    The list below reflectives the updated PTA list. Please review each medication in order reconciliation for additional clarification and justification.  Medications Prior to Admission   Medication Sig Dispense Refill Last Dose/Taking    ALPRAZolam (Xanax) 0.25 mg tablet Take 1 tablet (0.25 mg) by mouth once daily at bedtime.   Past Week    aspirin 81 mg EC tablet Take 1 tablet (81 mg) by mouth once daily.   Past Week    ergocalciferol (Vitamin D-2) 1.25 MG (86289 UT) capsule Take 2 capsules (100,000 Units) by mouth once a week.   Past Week    felodipine ER (Plendil) 2.5 mg 24 hr tablet Take 1 tablet (2.5 mg) by mouth once daily. Do not crush, chew, or split.   Past Week    gabapentin (Neurontin) 400 mg capsule Take 1 capsule (400 mg) by mouth 3 times a day.   Past Week    levothyroxine (Synthroid, Levoxyl) 137 mcg tablet Take 1 tablet (137 mcg) by mouth once daily. 3 tablets once per day   Past Week    lisinopriL-hydrochlorothiazide 20-12.5 mg tablet Take 1 tablet by mouth once daily.   Past Week        The list below reflectives the updated allergy list. Please review each documented allergy for additional clarification and justification.  Allergies  Reviewed by Hoang Granados RN on 2/10/2025        Severity Reactions Comments    Cephalexin Not Specified Unknown Pt reported swelling of breast and abdomen and not feeling like herself.            Below are additional concerns with the patient's PTA list.  The following updates were made to the Prior to Admission medication list:     Source of Information:     Medications ADDED:   Felodipine 2.5mg ER once daily  Medications CHANGED:  Alprazolam 0.25mg BID to 0.25 daily  Gabapentin 800mg TID to 400mg TID  Medications REMOVED:    N/A  Medications NOT TAKING:   N/A    Allergy reviewed : Yes    Meds 2 Beds : N/A    Comments: Patient intubated and sedated. Med list gathered from outpatient fill history and last note see at CC.      Carson PedersonD

## 2025-02-11 NOTE — PROCEDURES
"ARTERIAL LINE INSERTION    Site: Right femoral artery      Pre-procedure diagnosis: PEA arrest  Post-procedure diagnosis: Same  Indication(s):  Monitor and guide therapy for hypotensive state with (risk of) end-organ damage  Performed by:  Me  Assistant(s):  Alvarado Angel  EBL:   Min    Consent obtained: Emergent  Patient identified: Yes  Timeout performed: Emergent  Sedation / analgesia: None.  Sterility:   Hat & mask on myself and assistant(s).  Site prepped with 2% CHG and 70% IPA solution using applicator.  Sterile gloves, gown (for femoral line), and drape.  Landmarks identified: Yes  Ultrasound guided: No.  Local anesthetic: None.    Artery punctured with 18 Ga. x 1.5\" introducer needle.  Supplied 0.025\" diameter spring guidewire thread easily through introducer needle and needle removed.  18 Ga. x 16 cm catheter thready easily over guidewire and guidewire removed.  Port aspirated to remove air, then flushed with sterile NS.  Catheter secured with suture x 2 and CHG dressing.    Arterial puncture x 1.  No complications.  Other details: None.    ARROW Arterial Catheterization Kit        Lissa Grayson,   "

## 2025-02-11 NOTE — PROCEDURES
Physician Post-Code Blue Note    Code Location:    Date of Event:  02/11/25     Primary Service:     Reason code was called:    Initial Rhythm: PEA    Airway management:  Patient was intubated prior to arrest.  BVM used during CPR    Chest compressions:   In process upon arrival      Result of the code:  [ ] MICU - remained in MICU    Primary service present during the code: yes  Attending notified:   Name:  Dr Grayson  Family notified:  Mother, , and sister    Patient did have 2 DC cardioversion for VT.  Please see Code documentation for more information.    Lissa Grayson DO

## 2025-02-11 NOTE — PROGRESS NOTES
Rashmi Oreilly is a 69 y.o. female on day 1 of admission presenting with Intentional drug overdose, initial encounter (Multi).    Plan: patient admitted from home after drug overdose from fentanyl and cocaine, went into PEA arrest, ROSC achieved. Intubated and sedated, will discuss DC planning once patient is more stable.   Disposition: Home?  Barrier: intubated, sedated, therapy eval if needed  ADOD:  2-4 days       02/11/25 1335   Discharge Planning   Living Arrangements Spouse/significant other;Parent   Support Systems Spouse/significant other;Parent   Assistance Needed independent   Type of Residence Private residence   Who is requesting discharge planning? Provider   Expected Discharge Disposition Home   Does the patient need discharge transport arranged? No   Stroke Family Assessment   Stroke Family Assessment Needed No   Intensity of Service   Intensity of Service 0-30 min         Desi Sanchez RN

## 2025-02-11 NOTE — CARE PLAN
Problem: Safety - Medical Restraint  Goal: Remains free of injury from restraints (Restraint for Interference with Medical Device)  Outcome: Progressing  Flowsheets (Taken 2/11/2025 1027)  Remains free of injury from restraints (restraint for interference with medical device): Every 2 hours: Monitor safety, psychosocial status, comfort, nutrition and hydration  Goal: Free from restraint(s) (Restraint for Interference with Medical Device)  Outcome: Progressing     Problem: Skin  Goal: Promote skin healing  Outcome: Progressing  Flowsheets (Taken 2/11/2025 1027)  Promote skin healing: Turn/reposition every 2 hours/use positioning/transfer devices     Problem: Fall/Injury  Goal: Not fall by end of shift  Outcome: Progressing  Goal: Be free from injury by end of the shift  Outcome: Progressing

## 2025-02-12 ENCOUNTER — APPOINTMENT (OUTPATIENT)
Dept: RADIOLOGY | Facility: HOSPITAL | Age: 70
DRG: 917 | End: 2025-02-12
Payer: MEDICARE

## 2025-02-12 ENCOUNTER — APPOINTMENT (OUTPATIENT)
Dept: CARDIOLOGY | Facility: HOSPITAL | Age: 70
DRG: 917 | End: 2025-02-12
Payer: MEDICARE

## 2025-02-12 LAB
ALBUMIN SERPL BCP-MCNC: 3.2 G/DL (ref 3.4–5)
ALP SERPL-CCNC: 110 U/L (ref 33–136)
ALT SERPL W P-5'-P-CCNC: 37 U/L (ref 7–45)
ANION GAP SERPL CALC-SCNC: 13 MMOL/L (ref 10–20)
AORTIC VALVE MEAN GRADIENT: 13 MMHG
AORTIC VALVE PEAK VELOCITY: 2.7 M/S
AST SERPL W P-5'-P-CCNC: 45 U/L (ref 9–39)
AV PEAK GRADIENT: 29 MMHG
AVA (PEAK VEL): 1.54 CM2
AVA (VTI): 1.3 CM2
BASOPHILS # BLD AUTO: 0.08 X10*3/UL (ref 0–0.1)
BASOPHILS NFR BLD AUTO: 0.4 %
BILIRUB SERPL-MCNC: 1.2 MG/DL (ref 0–1.2)
BODY SURFACE AREA: 2.14 M2
BUN SERPL-MCNC: 37 MG/DL (ref 6–23)
CALCIUM SERPL-MCNC: 9.2 MG/DL (ref 8.6–10.3)
CHLORIDE SERPL-SCNC: 112 MMOL/L (ref 98–107)
CO2 SERPL-SCNC: 25 MMOL/L (ref 21–32)
CREAT SERPL-MCNC: 1.87 MG/DL (ref 0.5–1.05)
EGFRCR SERPLBLD CKD-EPI 2021: 29 ML/MIN/1.73M*2
EJECTION FRACTION APICAL 4 CHAMBER: 70.6
EJECTION FRACTION: 63 %
EOSINOPHIL # BLD AUTO: 0.07 X10*3/UL (ref 0–0.7)
EOSINOPHIL NFR BLD AUTO: 0.4 %
ERYTHROCYTE [DISTWIDTH] IN BLOOD BY AUTOMATED COUNT: 15.4 % (ref 11.5–14.5)
GLUCOSE BLD MANUAL STRIP-MCNC: 107 MG/DL (ref 74–99)
GLUCOSE BLD MANUAL STRIP-MCNC: 116 MG/DL (ref 74–99)
GLUCOSE BLD MANUAL STRIP-MCNC: 121 MG/DL (ref 74–99)
GLUCOSE BLD MANUAL STRIP-MCNC: 88 MG/DL (ref 74–99)
GLUCOSE BLD MANUAL STRIP-MCNC: 96 MG/DL (ref 74–99)
GLUCOSE SERPL-MCNC: 113 MG/DL (ref 74–99)
HCT VFR BLD AUTO: 41.2 % (ref 36–46)
HGB BLD-MCNC: 12.8 G/DL (ref 12–16)
IMM GRANULOCYTES # BLD AUTO: 0.1 X10*3/UL (ref 0–0.7)
IMM GRANULOCYTES NFR BLD AUTO: 0.5 % (ref 0–0.9)
LACTATE SERPL-SCNC: 1.2 MMOL/L (ref 0.4–2)
LACTATE SERPL-SCNC: 2.2 MMOL/L (ref 0.4–2)
LEFT ATRIUM VOLUME AREA LENGTH INDEX BSA: 25.6 ML/M2
LEFT VENTRICLE INTERNAL DIMENSION DIASTOLE: 3.29 CM (ref 3.5–6)
LEFT VENTRICULAR OUTFLOW TRACT DIAMETER: 2.4 CM
LYMPHOCYTES # BLD AUTO: 3.72 X10*3/UL (ref 1.2–4.8)
LYMPHOCYTES NFR BLD AUTO: 19.8 %
MCH RBC QN AUTO: 32.9 PG (ref 26–34)
MCHC RBC AUTO-ENTMCNC: 31.1 G/DL (ref 32–36)
MCV RBC AUTO: 106 FL (ref 80–100)
MITRAL VALVE E/A RATIO: 0.43
MONOCYTES # BLD AUTO: 1.55 X10*3/UL (ref 0.1–1)
MONOCYTES NFR BLD AUTO: 8.2 %
NEUTROPHILS # BLD AUTO: 13.3 X10*3/UL (ref 1.2–7.7)
NEUTROPHILS NFR BLD AUTO: 70.7 %
NRBC BLD-RTO: 0 /100 WBCS (ref 0–0)
PLATELET # BLD AUTO: 84 X10*3/UL (ref 150–450)
POTASSIUM SERPL-SCNC: 4.8 MMOL/L (ref 3.5–5.3)
PROT SERPL-MCNC: 6.8 G/DL (ref 6.4–8.2)
RBC # BLD AUTO: 3.89 X10*6/UL (ref 4–5.2)
RIGHT VENTRICLE FREE WALL PEAK S': 17 CM/S
RIGHT VENTRICLE PEAK SYSTOLIC PRESSURE: 52.7 MMHG
SODIUM SERPL-SCNC: 145 MMOL/L (ref 136–145)
TRICUSPID ANNULAR PLANE SYSTOLIC EXCURSION: 2.7 CM
WBC # BLD AUTO: 18.8 X10*3/UL (ref 4.4–11.3)

## 2025-02-12 PROCEDURE — 4B02XTZ MEASUREMENT OF CARDIAC DEFIBRILLATOR, EXTERNAL APPROACH: ICD-10-PCS | Performed by: NURSE PRACTITIONER

## 2025-02-12 PROCEDURE — 2500000001 HC RX 250 WO HCPCS SELF ADMINISTERED DRUGS (ALT 637 FOR MEDICARE OP): Performed by: INTERNAL MEDICINE

## 2025-02-12 PROCEDURE — 99291 CRITICAL CARE FIRST HOUR: CPT | Performed by: INTERNAL MEDICINE

## 2025-02-12 PROCEDURE — 78830 RP LOCLZJ TUM SPECT W/CT 1: CPT | Performed by: NUCLEAR MEDICINE

## 2025-02-12 PROCEDURE — 37799 UNLISTED PX VASCULAR SURGERY: CPT | Performed by: NURSE PRACTITIONER

## 2025-02-12 PROCEDURE — 2500000004 HC RX 250 GENERAL PHARMACY W/ HCPCS (ALT 636 FOR OP/ED): Performed by: INTERNAL MEDICINE

## 2025-02-12 PROCEDURE — 94681 O2 UPTK CO2 OUTP % O2 XTRC: CPT

## 2025-02-12 PROCEDURE — 78830 RP LOCLZJ TUM SPECT W/CT 1: CPT

## 2025-02-12 PROCEDURE — 2500000005 HC RX 250 GENERAL PHARMACY W/O HCPCS: Performed by: INTERNAL MEDICINE

## 2025-02-12 PROCEDURE — 5A0935A ASSISTANCE WITH RESPIRATORY VENTILATION, LESS THAN 24 CONSECUTIVE HOURS, HIGH NASAL FLOW/VELOCITY: ICD-10-PCS | Performed by: INTERNAL MEDICINE

## 2025-02-12 PROCEDURE — 87070 CULTURE OTHR SPECIMN AEROBIC: CPT | Mod: AHULAB | Performed by: INTERNAL MEDICINE

## 2025-02-12 PROCEDURE — 83605 ASSAY OF LACTIC ACID: CPT | Performed by: NURSE PRACTITIONER

## 2025-02-12 PROCEDURE — 93970 EXTREMITY STUDY: CPT

## 2025-02-12 PROCEDURE — 37799 UNLISTED PX VASCULAR SURGERY: CPT | Performed by: INTERNAL MEDICINE

## 2025-02-12 PROCEDURE — 80053 COMPREHEN METABOLIC PANEL: CPT | Performed by: INTERNAL MEDICINE

## 2025-02-12 PROCEDURE — 2500000002 HC RX 250 W HCPCS SELF ADMINISTERED DRUGS (ALT 637 FOR MEDICARE OP, ALT 636 FOR OP/ED): Performed by: NURSE PRACTITIONER

## 2025-02-12 PROCEDURE — A9540 TC99M MAA: HCPCS | Performed by: INTERNAL MEDICINE

## 2025-02-12 PROCEDURE — 85025 COMPLETE CBC W/AUTO DIFF WBC: CPT | Performed by: INTERNAL MEDICINE

## 2025-02-12 PROCEDURE — 3430000001 HC RX 343 DIAGNOSTIC RADIOPHARMACEUTICALS: Performed by: INTERNAL MEDICINE

## 2025-02-12 PROCEDURE — 99233 SBSQ HOSP IP/OBS HIGH 50: CPT | Performed by: NURSE PRACTITIONER

## 2025-02-12 PROCEDURE — 2020000001 HC ICU ROOM DAILY

## 2025-02-12 PROCEDURE — 82947 ASSAY GLUCOSE BLOOD QUANT: CPT

## 2025-02-12 PROCEDURE — 94003 VENT MGMT INPAT SUBQ DAY: CPT

## 2025-02-12 PROCEDURE — 93970 EXTREMITY STUDY: CPT | Performed by: RADIOLOGY

## 2025-02-12 PROCEDURE — 2500000004 HC RX 250 GENERAL PHARMACY W/ HCPCS (ALT 636 FOR OP/ED): Performed by: STUDENT IN AN ORGANIZED HEALTH CARE EDUCATION/TRAINING PROGRAM

## 2025-02-12 RX ORDER — ALPRAZOLAM 0.5 MG/1
0.5 TABLET ORAL ONCE
Status: COMPLETED | OUTPATIENT
Start: 2025-02-12 | End: 2025-02-12

## 2025-02-12 RX ORDER — LIDOCAINE 560 MG/1
1 PATCH PERCUTANEOUS; TOPICAL; TRANSDERMAL DAILY
Status: DISCONTINUED | OUTPATIENT
Start: 2025-02-13 | End: 2025-02-19 | Stop reason: HOSPADM

## 2025-02-12 RX ORDER — HEPARIN SODIUM 5000 [USP'U]/ML
5000 INJECTION, SOLUTION INTRAVENOUS; SUBCUTANEOUS EVERY 8 HOURS SCHEDULED
Status: DISCONTINUED | OUTPATIENT
Start: 2025-02-12 | End: 2025-02-19 | Stop reason: HOSPADM

## 2025-02-12 RX ORDER — ALPRAZOLAM 0.25 MG/1
0.25 TABLET ORAL DAILY
Status: DISCONTINUED | OUTPATIENT
Start: 2025-02-13 | End: 2025-02-14

## 2025-02-12 RX ORDER — SODIUM CHLORIDE, SODIUM LACTATE, POTASSIUM CHLORIDE, CALCIUM CHLORIDE 600; 310; 30; 20 MG/100ML; MG/100ML; MG/100ML; MG/100ML
75 INJECTION, SOLUTION INTRAVENOUS CONTINUOUS
Status: ACTIVE | OUTPATIENT
Start: 2025-02-12 | End: 2025-02-13

## 2025-02-12 RX ADMIN — PROPOFOL 35 MCG/KG/MIN: 10 INJECTION, EMULSION INTRAVENOUS at 03:21

## 2025-02-12 RX ADMIN — ALPRAZOLAM 0.5 MG: 0.5 TABLET ORAL at 11:20

## 2025-02-12 RX ADMIN — POLYETHYLENE GLYCOL 3350 17 G: 17 POWDER, FOR SOLUTION ORAL at 08:14

## 2025-02-12 RX ADMIN — PROPOFOL 35 MCG/KG/MIN: 10 INJECTION, EMULSION INTRAVENOUS at 05:15

## 2025-02-12 RX ADMIN — HEPARIN SODIUM 5000 UNITS: 5000 INJECTION INTRAVENOUS; SUBCUTANEOUS at 21:37

## 2025-02-12 RX ADMIN — HEPARIN SODIUM 5000 UNITS: 5000 INJECTION INTRAVENOUS; SUBCUTANEOUS at 14:16

## 2025-02-12 RX ADMIN — LEVOTHYROXINE SODIUM 137 MCG: 0.14 TABLET ORAL at 08:14

## 2025-02-12 RX ADMIN — KIT FOR THE PREPARATION OF TECHNETIUM TC 99M ALBUMIN AGGREGATED 4.4 MILLICURIE: 2.5 INJECTION, POWDER, FOR SOLUTION INTRAVENOUS at 17:50

## 2025-02-12 RX ADMIN — PSYLLIUM HUSK 1 PACKET: 3.4 POWDER ORAL at 08:14

## 2025-02-12 RX ADMIN — Medication 8 L/MIN: at 20:00

## 2025-02-12 RX ADMIN — PIPERACILLIN SODIUM AND TAZOBACTAM SODIUM 3.38 G: 3; .375 INJECTION, SOLUTION INTRAVENOUS at 17:12

## 2025-02-12 RX ADMIN — SODIUM CHLORIDE, POTASSIUM CHLORIDE, SODIUM LACTATE AND CALCIUM CHLORIDE 75 ML/HR: 600; 310; 30; 20 INJECTION, SOLUTION INTRAVENOUS at 09:42

## 2025-02-12 RX ADMIN — PIPERACILLIN SODIUM AND TAZOBACTAM SODIUM 3.38 G: 3; .375 INJECTION, SOLUTION INTRAVENOUS at 11:19

## 2025-02-12 RX ADMIN — PIPERACILLIN SODIUM AND TAZOBACTAM SODIUM 3.38 G: 3; .375 INJECTION, SOLUTION INTRAVENOUS at 23:21

## 2025-02-12 RX ADMIN — FAMOTIDINE 20 MG: 20 TABLET, FILM COATED ORAL at 08:14

## 2025-02-12 RX ADMIN — Medication 15 L/MIN: at 12:22

## 2025-02-12 ASSESSMENT — COGNITIVE AND FUNCTIONAL STATUS - GENERAL
TURNING FROM BACK TO SIDE WHILE IN FLAT BAD: A LOT
CLIMB 3 TO 5 STEPS WITH RAILING: A LOT
MOVING FROM LYING ON BACK TO SITTING ON SIDE OF FLAT BED WITH BEDRAILS: A LOT
MOBILITY SCORE: 12
PERSONAL GROOMING: TOTAL
STANDING UP FROM CHAIR USING ARMS: A LOT
DRESSING REGULAR LOWER BODY CLOTHING: TOTAL
DRESSING REGULAR UPPER BODY CLOTHING: TOTAL
MOVING TO AND FROM BED TO CHAIR: A LOT
HELP NEEDED FOR BATHING: TOTAL
WALKING IN HOSPITAL ROOM: A LOT
TOILETING: TOTAL
DAILY ACTIVITIY SCORE: 6
EATING MEALS: TOTAL

## 2025-02-12 ASSESSMENT — PAIN SCALES - GENERAL: PAINLEVEL_OUTOF10: 0 - NO PAIN

## 2025-02-12 ASSESSMENT — PAIN - FUNCTIONAL ASSESSMENT
PAIN_FUNCTIONAL_ASSESSMENT: 0-10
PAIN_FUNCTIONAL_ASSESSMENT: CPOT (CRITICAL CARE PAIN OBSERVATION TOOL)

## 2025-02-12 NOTE — SIGNIFICANT EVENT
Patient extubated per . Placed on 15L HFNC, tolerating well at this time. No complications noted.

## 2025-02-12 NOTE — PROGRESS NOTES
Critical Care Medicine Progress Note     Impression/Plan:Rashmi Oreilly is a 68 y/o f pmhx of HCV cirrhosis, OA s/p Left SANDI, ICD placement for VT, depression, anxiety on Alprazolam at home, who is admitted to the ICU with acute respiratory failure with hypoxemia d/t aspiration (most likely) in the setting of airway compromise as a consequence of toxic encephalopathy d/t cocaine, fentanyl with likely less contribution from benzodiazepines as patient takes Alprazolam at home.      Timeline:   2/10 - cardiopulmonary arrest, manifesting as PEA. During ACLS, patient was noted to have VT x 2 prompting shock. Amiodarone bolus given x 1, along with epinephrine per ACLS guidelines. Family notified. Vasopressors have since been weaned. Of note, on further investigation into patient's chart, patient did not follow-up with her cardiologist as advised for AICD up-grade.      On-going issues include the following:   Encephalopathy - multifactorial d/t polysuybstance overdose (cocaine and fentanyl most likely - POA)) coupled with anoxic ischemic component d/t PEA arrest with subsequent VT during ACLS resuscitation (~10 min). AICD is non-functioning on interrogation. Continues to improve and is following commands.   Acute respiratory failure d/t air-way compromise (POA) - resolving. Liberated from vent support at 1200 on 2/12  Aspiration pneumonitis d/t polysubstance overdose - wbc rising. However, respiratory support requirements are improving. On Pip-tazo  KT stage2- rising Cr and decreasing urine out-put, likely related to periodic decreases in MAP on 2/11 while on propofol for sedation. MAP has been stable            Neuro  - A-F bundle   - Sleep Hygiene measures: appropriate daytime stimulation/physical activity. Lights out at 2100, avoidance of night time lab draws/night baths, avoidance of delirium provoking medications  -- resume home dose Alprazolam 25 mg po daily starting on 2/13        CV  - goal MAP >/= 65 mm hg             Pulm/ID:   - Extubated to NC O2  - Monitor very closely   - empiric Pip-tazo and follow sputum Cx's           Renal/electrolyte/acid-base:   - avoid nephrotoxins as much as possible   - replace electrolytes as indicated   - maintain armendariz for accurate I/O        GI/Nutrition   - TF trophic feeds for today  - GI ppx: H2B          Heme   - DVT ppx: SCD's     Endocrine  - goal serum glucose 140-180 mg/dL      Musculoskeletal/skin  - skin protective measures   - PT/OT when able      Family contact:  Margaret Baer (mother) and Johnson ()         Critical Care time: 80 min           Interval History: liberated from vent support at ~ 1200. Off Norepinephrine as propofol has been discontinued.             Objective   Vitals:  Most Recent:  Vitals:    02/12/25 1222   BP:    Pulse:    Resp:    Temp:    SpO2: 93%       24hr Min/Max:  Temp  Min: 36.4 °C (97.5 °F)  Max: 37.4 °C (99.3 °F)  Pulse  Min: 52  Max: 110  BP  Min: 87/55  Max: 110/91  Resp  Min: 0  Max: 32  SpO2  Min: 87 %  Max: 97 %    LDA:  Arterial Line 02/10/25 Right Femoral (Active)   Placement Date/Time: 02/10/25 1910   Hand Hygiene Completed: Yes  Orientation: Right  Location: Femoral  Site Prep: Chlorhexidine   Local Anesthetic: Injectable   Number of days: 1       Urethral Catheter 16 Fr. (Active)   Placement Date/Time: 02/10/25 1547   Placed by: sabrina  Tube Size (Fr.): 16 Fr.  Catheter Balloon Size: 10 mL   Number of days: 1       NG/OG/Feeding Tube 16 Fr Center mouth (Active)   Placement Date/Time: 02/10/25 1545   Placed by: sabrina  Type of Tube: NG/OG Tube  Tube Length: 55 cm  NG/OG Tube Size: 16 Fr  Tube Location: Center mouth   Number of days: 1             Hemodynamic parameters for last 24 hours:       I/O:  I/O last 2 completed shifts:  In: 318.5 (3.2 mL/kg) [I.V.:308.5 (3.1 mL/kg); NG/GT:10]  Out: 625 (6.3 mL/kg) [Urine:625 (0.3 mL/kg/hr)]  Weight: 99.7 kg     Physical Exam:   BP 87/55   Pulse 74   Temp 36.7 °C (98.1 °F)   Resp 18   Ht 1.651  "m (5' 5\")   Wt 99.7 kg (219 lb 12.8 oz)   SpO2 93%   BMI 36.58 kg/m²   Gen: appears slightly drowsy post-extubation, NAD  Neuro: GCS E4, V5, M6, no focal deficits   CV: tachycardic, S1s2  Pulm: CTAB  Chest: neg accessory muscle recruitment   Abd: obese, soft, non-tender   Ext: well perfused, chronic venous stasis changes to lower legs b/l cap refill < 2 sec  Skin: warm and dry, no mottling or rash                "

## 2025-02-12 NOTE — CARE PLAN
The patient's goals for the shift include  maintain map above 65     The clinical goals for the shift include patient will remain HDS throughout this shift

## 2025-02-12 NOTE — CARE PLAN
Problem: Safety - Medical Restraint  Goal: Remains free of injury from restraints (Restraint for Interference with Medical Device)  Outcome: Progressing  Flowsheets (Taken 2/12/2025 3554)  Remains free of injury from restraints (restraint for interference with medical device): Every 2 hours: Monitor safety, psychosocial status, comfort, nutrition and hydration  Goal: Free from restraint(s) (Restraint for Interference with Medical Device)  Outcome: Progressing     Problem: Skin  Goal: Promote skin healing  Outcome: Progressing  Flowsheets (Taken 2/12/2025 5664)  Promote skin healing: Turn/reposition every 2 hours/use positioning/transfer devices     Problem: Fall/Injury  Goal: Not fall by end of shift  Outcome: Progressing  Goal: Be free from injury by end of the shift  Outcome: Progressing

## 2025-02-12 NOTE — PROGRESS NOTES
02/12/25 1230   Discharge Planning   Expected Discharge Disposition Home        Patient remains in ICU and as of this morning she ws still intubated and sedated. Plan is to attempt to extubate today. Unable to determine discharge plan yet. Will continue to follow for discharge needs.

## 2025-02-12 NOTE — PROGRESS NOTES
"Subjective Data:  Remains intubated/sedated       Overnight Events:    On no vasopressor support  On propofol   Telemetry showed SB NSVT 7 beats   WBC 18.8 today      Objective Data:  Last Recorded Vitals:  Vitals:    02/12/25 0900 02/12/25 0915 02/12/25 0930 02/12/25 0945   BP: 100/68 95/69 (!) 110/91 87/55   Pulse: 67 68 70 74   Resp: 18 17 15 18   Temp: 36.7 °C (98.1 °F) 36.7 °C (98.1 °F) 36.8 °C (98.2 °F) 36.7 °C (98.1 °F)   TempSrc:       SpO2: 94% 95% 95% 93%   Weight:       Height:           Last Labs:  LABS:  CMP:  Results from last 7 days   Lab Units 02/12/25 0419 02/11/25 0436 02/11/25  0147 02/10/25  1930 02/10/25  1722 02/10/25  1524   SODIUM mmol/L 145 142 140 146*  --  141   POTASSIUM mmol/L 4.8 5.3 5.0 3.1*  --  4.2   CHLORIDE mmol/L 112* 114* 113* 108*  --  111*   CO2 mmol/L 25 22 24 28  --  23   ANION GAP mmol/L 13 11 8* 13  --  11   BUN mg/dL 37* 28* 28* 26*  --  26*   CREATININE mg/dL 1.87* 1.44* 1.41* 1.38*  --  1.33*   EGFR mL/min/1.73m*2 29* 40* 41* 42*  --  44*   MAGNESIUM mg/dL  --  2.65*  --   --  1.54* 1.48*   ALBUMIN g/dL 3.2* 2.8*  --   --   --  3.2*   ALT U/L 37 36  --   --   --  37   AST U/L 45* 47*  --   --   --  51*   BILIRUBIN TOTAL mg/dL 1.2 1.2  --   --   --  1.2     CBC:  Results from last 7 days   Lab Units 02/12/25  0419 02/11/25  0436 02/10/25  1930 02/10/25  1521   WBC AUTO x10*3/uL 18.8* 6.3 9.8 3.9*   HEMOGLOBIN g/dL 12.8 11.8* 12.8 9.0*   HEMATOCRIT % 41.2 33.8* 37.2 27.9*   PLATELETS AUTO x10*3/uL 84* 56* 77* 51*   MCV fL 106* 97 97 104*     COAG:   Results from last 7 days   Lab Units 02/10/25  1524   INR  1.4*     ABO: No results found for: \"ABO\"  HEME/ENDO:  Results from last 7 days   Lab Units 02/10/25  1524   TSH mIU/L 1.16      CARDIAC:   Results from last 7 days   Lab Units 02/10/25  1722 02/10/25  1524 02/10/25  1521   TROPHS ng/L 4 4  --    BNP pg/mL  --   --  111*   No results for input(s): \"CHOL\", \"LDLF\", \"LDL\", \"LDLCALC\", \"HDL\", \"TRIG\" in the last 87600 " hours.     [facility] Results  Cardiac Device Check - Inpatient         Transthoracic Echo (TTE) Complete   Final Result      XR chest abdomen for OG NG placement   Final Result   1.ET tube in place with the tip about 2.0 cm above the level of   the seven. Enteric tube in place with the tip in the right midabdomen   and could be in the gastric antrum or proximal duodenum.   2.Relatively gasless gastrointestinal pattern with no overt   evidence of bowel obstruction.   3.Bibasilar subsegmental atelectasis or scarring with possible   nodule in the right upper lobe versus artifact and possible nodule   versus scarring or atelectasis in the left base. Attention to these on   follow-up is suggested.   Signed by Amy Noe DO      CT head wo IV contrast   Final Result   No CT evidence for acute intracranial pathology.        Signed by: Blaine Perez 2/10/2025 5:49 PM   Dictation workstation:   [id number]      XR chest abdomen for OG NG placement   Final Result   1. Mild opacification of the left lung base may reflect atelectasis   or small infiltrate.   2. The enteric tube distal tip projects at the expected location of   the gastric body.   3. The endotracheal tube distal tip projects 5.3 cm above the seven.        MACRO:   None        Signed by: Rachael James 2/10/2025 4:28 PM   Dictation workstation:   [id number]            Last I/O:  I/O last 3 completed shifts:  In: 318.5 (3.2 mL/kg) [I.V.:308.5 (3.1 mL/kg); NG/GT:10]  Out: 920 (9.2 mL/kg) [Urine:920 (0.3 mL/kg/hr)]  Weight: 99.7 kg     Past Cardiology Tests (Last 3 Years):  EKG:  ECG 12 lead 02/10/2025    ECG 12 lead 02/10/2025                            Echo:  Transthoracic Echo (TTE) Complete 02/11/2025   1. Poorly visualized anatomical structures due to suboptimal image quality.   2. Left ventricular ejection fraction is normal, by visual estimate at 60-65%.   3. Right ventricular volume overload.   4. There is normal right ventricular global systolic  function.   5. Severely enlarged right ventricle.   6. Severe tricuspid regurgitation visualized.   7. Moderately elevated right ventricular systolic pressure.   8. Reported right ventricular systolic pressure may be underestimated due to severe tricuspid regurgitation.   9. The right atrium is moderately dilated.  10. There is moderate mitral annular calcification.  11. Moderate aortic valve stenosis.    4/10/2023- [facility]   - Technically difficult exam due to suboptimal positioning, body habitus and patient experiencing rib,back and hip pain.   - Exam indication: Palpitations; Edema   - The left ventricle is small. Left ventricular systolic function is normal. EF = 63 ± 5% (2D 4-ch.) Grade I left ventricular diastolic dysfunction.   - The right ventricle is normal in size. Right ventricular systolic function is normal.   - The right atrial cavity is dilated.   - There is moderate (2+ - 3+) tricuspid valve regurgitation.   - Estimated right ventricular systolic pressure is 35 mmHg consistent with normal pulmonary artery pressures. Estimated right atrial pressure is 8 mmHg (although IVC not seen).   - Exam was compared with the prior  echocardiographic exam performed on  9/6/2018.       9/6/2018- [facility]   CONCLUSIONS:   - Exam indication: Syncope; SOB   - The left ventricle is normal in size. Left ventricular systolic function is normal. EF = 55 ± 5% (2D biplane)   - The right ventricle is normal in size. Right ventricular systolic function is normal.   - There is moderately severe (3+) tricuspid valve regurgitation.   - The patient has not had a prior CC echocardiographic exam for comparison.       Ejection Fractions:  EF   Date/Time Value Ref Range Status   02/11/2025 03:37 PM 63 %      Cath:  Per documentation, left heart cath in 2010 in [place] was negative for CAD     Stress Test:  NM Cardiac Perf Stress/Pharm 11/21/2018   1. SPECT Perfusion Study: Normal.    2. There is no scintigraphic  evidence for inducible ischemia.    3. No evidence of scarred myocardium.    4. Functional capacity N/A (pharmacological).    5. Left ventricle is normal in size. The left ventricle systolic function is normal.    6. Right ventricle is normal in size. The right ventricle systolic function is normal.    7. This is a low risk scan.        Cardiac Imaging:        Inpatient Medications:  Scheduled medications   Medication Dose Route Frequency    aspirin  81 mg oral Daily    [Held by provider] enoxaparin  40 mg subcutaneous q24h    famotidine  20 mg oral Daily    Or    famotidine  20 mg intravenous Daily    [Held by provider] gabapentin  400 mg oral TID    [Held by provider] lisinopril  20 mg oral Daily    And    [Held by provider] hydroCHLOROthiazide  12.5 mg oral Daily    levothyroxine  137 mcg oral Daily    ondansetron  4 mg intravenous Once    polyethylene glycol  17 g oral Daily    psyllium  1 packet oral Daily     PRN medications   Medication    [Held by provider] ALPRAZolam    oxygen     Continuous Medications   Medication Dose Last Rate    lactated Ringer's  75 mL/hr 75 mL/hr (02/12/25 0942)    norepinephrine  0-0.5 mcg/kg/min (Dosing Weight) Stopped (02/12/25 0915)    propofol  0-50 mcg/kg/min 20 mcg/kg/min (02/12/25 0930)       Physical Exam:  GENERAL: Intubated/sedated  SKIN: warm, dry  CARDIAC: Regular rate and rhythm no murmurs  PULMONARY: Normal respiratory efforts with ventilator, lungs clear to auscultation bilaterally.  ABDOMEN: soft, nondistended  EXTREMITIES: no lower extremity edema  NEURO: Intubated/sedated     Assessment/Plan   Rashmi Oreilly is a 69 y.o. female with past medical history significant for Cardiac arrest secondary to ventricular fibrillation  in setting of electrolyte abnormalities  s/p  [facility] placement 4/1/2010,  Hypertension, Hyperlipidemia,  Chronic hepatitis C with cirrhosis, Hypothyroidism s/p thyroidectomy for papillary thyroid cancer, Anxiety/depression, Panic  disorder, Lyme disease 1995, Osteoarthritis s/p total knee arthroplasty, Drug and alcohol use. Presented with mental status change/overdose. Cardiology is consulted for AICD interrogation s/p brief cardiac arrest in ICU. Hx of cardiac arrest in 2010.       Home CV meds: aspirin 81 mg daily, Lisinopril-hydrochlorothiazide 20-12.5 mg daily      #Cardiac arrest   While in the ICU, she experienced cardiac arrest with documented bradycardia, pulselessness, and VT, requiring CPR, shock x 2,  epinephrine, bicarbonate and amiodarone, with subsequent ROSC   -Per documentation, her AICD was NABEEL in 2018. AICD was initially placed 2010 after VF  arrest  in setting of electrolyte abnormalities.  No documentation of generator change out.   -Device interrogated today 2/12/2024 which showed EOL  -Echocardiogram 2/11/2025 showed LVEF 60-65% no wall motion abnormalities right ventricle was severely enlarged with normal RV function moderate aortic stenosis and severe tricuspid regurgitation no pericardial effusion.     #Moderate Aortic stenosis   Outpatient surveillance       -Plan to consult EP pending neurological recovery.     -Keep K >4 Mag >2. Continue to monitor on telemetry.        Code Status:  Full Code    Fernandez Dorantes, APRN-CNP

## 2025-02-13 LAB
ALBUMIN SERPL BCP-MCNC: 2.8 G/DL (ref 3.4–5)
ALP SERPL-CCNC: 94 U/L (ref 33–136)
ALT SERPL W P-5'-P-CCNC: 29 U/L (ref 7–45)
ANION GAP SERPL CALC-SCNC: 11 MMOL/L (ref 10–20)
ANION GAP SERPL CALC-SCNC: 12 MMOL/L (ref 10–20)
AST SERPL W P-5'-P-CCNC: 37 U/L (ref 9–39)
ATRIAL RATE: 65 BPM
BASOPHILS # BLD AUTO: 0.07 X10*3/UL (ref 0–0.1)
BASOPHILS NFR BLD AUTO: 0.7 %
BILIRUB SERPL-MCNC: 1.9 MG/DL (ref 0–1.2)
BUN SERPL-MCNC: 30 MG/DL (ref 6–23)
BUN SERPL-MCNC: 32 MG/DL (ref 6–23)
CALCIUM SERPL-MCNC: 8.7 MG/DL (ref 8.6–10.3)
CALCIUM SERPL-MCNC: 8.7 MG/DL (ref 8.6–10.3)
CHLORIDE SERPL-SCNC: 111 MMOL/L (ref 98–107)
CHLORIDE SERPL-SCNC: 112 MMOL/L (ref 98–107)
CO2 SERPL-SCNC: 25 MMOL/L (ref 21–32)
CO2 SERPL-SCNC: 25 MMOL/L (ref 21–32)
CREAT SERPL-MCNC: 1.6 MG/DL (ref 0.5–1.05)
CREAT SERPL-MCNC: 1.64 MG/DL (ref 0.5–1.05)
EGFRCR SERPLBLD CKD-EPI 2021: 34 ML/MIN/1.73M*2
EGFRCR SERPLBLD CKD-EPI 2021: 35 ML/MIN/1.73M*2
EOSINOPHIL # BLD AUTO: 0.18 X10*3/UL (ref 0–0.7)
EOSINOPHIL NFR BLD AUTO: 1.8 %
ERYTHROCYTE [DISTWIDTH] IN BLOOD BY AUTOMATED COUNT: 14.8 % (ref 11.5–14.5)
GLUCOSE BLD MANUAL STRIP-MCNC: 105 MG/DL (ref 74–99)
GLUCOSE BLD MANUAL STRIP-MCNC: 124 MG/DL (ref 74–99)
GLUCOSE BLD MANUAL STRIP-MCNC: 87 MG/DL (ref 74–99)
GLUCOSE SERPL-MCNC: 144 MG/DL (ref 74–99)
GLUCOSE SERPL-MCNC: 99 MG/DL (ref 74–99)
HCT VFR BLD AUTO: 31.2 % (ref 36–46)
HGB BLD-MCNC: 10.1 G/DL (ref 12–16)
HOLD SPECIMEN: NORMAL
HOLD SPECIMEN: NORMAL
IMM GRANULOCYTES # BLD AUTO: 0.05 X10*3/UL (ref 0–0.7)
IMM GRANULOCYTES NFR BLD AUTO: 0.5 % (ref 0–0.9)
LACTATE SERPL-SCNC: 1.5 MMOL/L (ref 0.4–2)
LYMPHOCYTES # BLD AUTO: 1.82 X10*3/UL (ref 1.2–4.8)
LYMPHOCYTES NFR BLD AUTO: 18.5 %
MCH RBC QN AUTO: 33.4 PG (ref 26–34)
MCHC RBC AUTO-ENTMCNC: 32.4 G/DL (ref 32–36)
MCV RBC AUTO: 103 FL (ref 80–100)
MONOCYTES # BLD AUTO: 0.75 X10*3/UL (ref 0.1–1)
MONOCYTES NFR BLD AUTO: 7.6 %
NEUTROPHILS # BLD AUTO: 6.95 X10*3/UL (ref 1.2–7.7)
NEUTROPHILS NFR BLD AUTO: 70.9 %
NRBC BLD-RTO: 0 /100 WBCS (ref 0–0)
P AXIS: 66 DEGREES
P OFFSET: 181 MS
P ONSET: 125 MS
PLATELET # BLD AUTO: 51 X10*3/UL (ref 150–450)
POTASSIUM SERPL-SCNC: 3.8 MMOL/L (ref 3.5–5.3)
POTASSIUM SERPL-SCNC: 4.5 MMOL/L (ref 3.5–5.3)
PR INTERVAL: 194 MS
PROT SERPL-MCNC: 6.1 G/DL (ref 6.4–8.2)
Q ONSET: 222 MS
QRS COUNT: 12 BEATS
QRS DURATION: 78 MS
QT INTERVAL: 438 MS
QTC CALCULATION(BAZETT): 502 MS
QTC FREDERICIA: 480 MS
R AXIS: 32 DEGREES
RBC # BLD AUTO: 3.02 X10*6/UL (ref 4–5.2)
SODIUM SERPL-SCNC: 143 MMOL/L (ref 136–145)
SODIUM SERPL-SCNC: 144 MMOL/L (ref 136–145)
T AXIS: 12 DEGREES
T OFFSET: 441 MS
VENTRICULAR RATE: 79 BPM
WBC # BLD AUTO: 9.8 X10*3/UL (ref 4.4–11.3)

## 2025-02-13 PROCEDURE — 85025 COMPLETE CBC W/AUTO DIFF WBC: CPT | Performed by: NURSE PRACTITIONER

## 2025-02-13 PROCEDURE — 36415 COLL VENOUS BLD VENIPUNCTURE: CPT | Performed by: NURSE PRACTITIONER

## 2025-02-13 PROCEDURE — 2500000001 HC RX 250 WO HCPCS SELF ADMINISTERED DRUGS (ALT 637 FOR MEDICARE OP): Performed by: NURSE PRACTITIONER

## 2025-02-13 PROCEDURE — 83605 ASSAY OF LACTIC ACID: CPT | Performed by: NURSE PRACTITIONER

## 2025-02-13 PROCEDURE — 2500000002 HC RX 250 W HCPCS SELF ADMINISTERED DRUGS (ALT 637 FOR MEDICARE OP, ALT 636 FOR OP/ED): Performed by: NURSE PRACTITIONER

## 2025-02-13 PROCEDURE — 97161 PT EVAL LOW COMPLEX 20 MIN: CPT | Mod: GP

## 2025-02-13 PROCEDURE — 80048 BASIC METABOLIC PNL TOTAL CA: CPT | Performed by: NURSE PRACTITIONER

## 2025-02-13 PROCEDURE — 84075 ASSAY ALKALINE PHOSPHATASE: CPT | Performed by: INTERNAL MEDICINE

## 2025-02-13 PROCEDURE — 99233 SBSQ HOSP IP/OBS HIGH 50: CPT | Performed by: NURSE PRACTITIONER

## 2025-02-13 PROCEDURE — 2500000004 HC RX 250 GENERAL PHARMACY W/ HCPCS (ALT 636 FOR OP/ED): Performed by: STUDENT IN AN ORGANIZED HEALTH CARE EDUCATION/TRAINING PROGRAM

## 2025-02-13 PROCEDURE — 97535 SELF CARE MNGMENT TRAINING: CPT | Mod: GO

## 2025-02-13 PROCEDURE — 2500000001 HC RX 250 WO HCPCS SELF ADMINISTERED DRUGS (ALT 637 FOR MEDICARE OP)

## 2025-02-13 PROCEDURE — 97165 OT EVAL LOW COMPLEX 30 MIN: CPT | Mod: GO

## 2025-02-13 PROCEDURE — 99291 CRITICAL CARE FIRST HOUR: CPT | Performed by: INTERNAL MEDICINE

## 2025-02-13 PROCEDURE — 2500000004 HC RX 250 GENERAL PHARMACY W/ HCPCS (ALT 636 FOR OP/ED): Performed by: INTERNAL MEDICINE

## 2025-02-13 PROCEDURE — 2020000001 HC ICU ROOM DAILY

## 2025-02-13 PROCEDURE — 2500000001 HC RX 250 WO HCPCS SELF ADMINISTERED DRUGS (ALT 637 FOR MEDICARE OP): Performed by: INTERNAL MEDICINE

## 2025-02-13 PROCEDURE — 97530 THERAPEUTIC ACTIVITIES: CPT | Mod: GP

## 2025-02-13 PROCEDURE — 82947 ASSAY GLUCOSE BLOOD QUANT: CPT

## 2025-02-13 PROCEDURE — 2500000005 HC RX 250 GENERAL PHARMACY W/O HCPCS: Performed by: INTERNAL MEDICINE

## 2025-02-13 PROCEDURE — 2500000005 HC RX 250 GENERAL PHARMACY W/O HCPCS: Performed by: NURSE PRACTITIONER

## 2025-02-13 RX ORDER — ACETAMINOPHEN 160 MG/5ML
650 SOLUTION ORAL EVERY 4 HOURS PRN
Status: DISCONTINUED | OUTPATIENT
Start: 2025-02-13 | End: 2025-02-19 | Stop reason: HOSPADM

## 2025-02-13 RX ORDER — ACETAMINOPHEN 325 MG/1
650 TABLET ORAL EVERY 4 HOURS PRN
Status: DISCONTINUED | OUTPATIENT
Start: 2025-02-13 | End: 2025-02-19 | Stop reason: HOSPADM

## 2025-02-13 RX ADMIN — Medication 6 L/MIN: at 11:46

## 2025-02-13 RX ADMIN — ASPIRIN 81 MG: 81 TABLET, COATED ORAL at 08:50

## 2025-02-13 RX ADMIN — PIPERACILLIN SODIUM AND TAZOBACTAM SODIUM 3.38 G: 3; .375 INJECTION, SOLUTION INTRAVENOUS at 17:57

## 2025-02-13 RX ADMIN — LIDOCAINE 4% 1 PATCH: 40 PATCH TOPICAL at 00:06

## 2025-02-13 RX ADMIN — Medication 4 L/MIN: at 20:32

## 2025-02-13 RX ADMIN — HEPARIN SODIUM 5000 UNITS: 5000 INJECTION INTRAVENOUS; SUBCUTANEOUS at 17:58

## 2025-02-13 RX ADMIN — PIPERACILLIN SODIUM AND TAZOBACTAM SODIUM 3.38 G: 3; .375 INJECTION, SOLUTION INTRAVENOUS at 12:26

## 2025-02-13 RX ADMIN — HEPARIN SODIUM 5000 UNITS: 5000 INJECTION INTRAVENOUS; SUBCUTANEOUS at 22:21

## 2025-02-13 RX ADMIN — POLYETHYLENE GLYCOL 3350 17 G: 17 POWDER, FOR SOLUTION ORAL at 08:49

## 2025-02-13 RX ADMIN — ACETAMINOPHEN 650 MG: 325 TABLET, FILM COATED ORAL at 22:21

## 2025-02-13 RX ADMIN — PSYLLIUM HUSK 1 PACKET: 3.4 POWDER ORAL at 08:49

## 2025-02-13 RX ADMIN — HEPARIN SODIUM 5000 UNITS: 5000 INJECTION INTRAVENOUS; SUBCUTANEOUS at 05:25

## 2025-02-13 RX ADMIN — LEVOTHYROXINE SODIUM 137 MCG: 0.14 TABLET ORAL at 08:50

## 2025-02-13 RX ADMIN — PIPERACILLIN SODIUM AND TAZOBACTAM SODIUM 3.38 G: 3; .375 INJECTION, SOLUTION INTRAVENOUS at 22:21

## 2025-02-13 RX ADMIN — ALPRAZOLAM 0.25 MG: 0.25 TABLET ORAL at 08:50

## 2025-02-13 RX ADMIN — PIPERACILLIN SODIUM AND TAZOBACTAM SODIUM 3.38 G: 3; .375 INJECTION, SOLUTION INTRAVENOUS at 05:24

## 2025-02-13 ASSESSMENT — PAIN - FUNCTIONAL ASSESSMENT
PAIN_FUNCTIONAL_ASSESSMENT: 0-10

## 2025-02-13 ASSESSMENT — PAIN DESCRIPTION - DESCRIPTORS
DESCRIPTORS: ACHING;DISCOMFORT
DESCRIPTORS: ACHING;DISCOMFORT
DESCRIPTORS: ACHING;CRAMPING
DESCRIPTORS: ACHING;CRAMPING

## 2025-02-13 ASSESSMENT — COGNITIVE AND FUNCTIONAL STATUS - GENERAL
DAILY ACTIVITIY SCORE: 13
DRESSING REGULAR LOWER BODY CLOTHING: A LOT
EATING MEALS: A LITTLE
TOILETING: A LOT
TOILETING: A LOT
MOVING TO AND FROM BED TO CHAIR: A LOT
MOVING FROM LYING ON BACK TO SITTING ON SIDE OF FLAT BED WITH BEDRAILS: A LOT
STANDING UP FROM CHAIR USING ARMS: A LOT
EATING MEALS: TOTAL
DRESSING REGULAR LOWER BODY CLOTHING: TOTAL
TURNING FROM BACK TO SIDE WHILE IN FLAT BAD: A LOT
DAILY ACTIVITIY SCORE: 6
HELP NEEDED FOR BATHING: A LOT
MOVING FROM LYING ON BACK TO SITTING ON SIDE OF FLAT BED WITH BEDRAILS: A LOT
TURNING FROM BACK TO SIDE WHILE IN FLAT BAD: A LOT
TOILETING: TOTAL
CLIMB 3 TO 5 STEPS WITH RAILING: A LOT
CLIMB 3 TO 5 STEPS WITH RAILING: TOTAL
DRESSING REGULAR LOWER BODY CLOTHING: TOTAL
MOVING TO AND FROM BED TO CHAIR: A LOT
EATING MEALS: A LITTLE
CLIMB 3 TO 5 STEPS WITH RAILING: A LOT
DAILY ACTIVITIY SCORE: 14
MOVING FROM LYING ON BACK TO SITTING ON SIDE OF FLAT BED WITH BEDRAILS: A LOT
WALKING IN HOSPITAL ROOM: A LOT
HELP NEEDED FOR BATHING: A LOT
WALKING IN HOSPITAL ROOM: TOTAL
STANDING UP FROM CHAIR USING ARMS: A LOT
MOBILITY SCORE: 12
MOBILITY SCORE: 12
STANDING UP FROM CHAIR USING ARMS: A LOT
DRESSING REGULAR UPPER BODY CLOTHING: A LOT
DRESSING REGULAR UPPER BODY CLOTHING: TOTAL
PERSONAL GROOMING: A LITTLE
MOVING TO AND FROM BED TO CHAIR: A LOT
WALKING IN HOSPITAL ROOM: A LOT
DRESSING REGULAR UPPER BODY CLOTHING: A LITTLE
TURNING FROM BACK TO SIDE WHILE IN FLAT BAD: A LOT
HELP NEEDED FOR BATHING: TOTAL
MOBILITY SCORE: 10
PERSONAL GROOMING: A LOT
PERSONAL GROOMING: TOTAL

## 2025-02-13 ASSESSMENT — PAIN SCALES - GENERAL
PAINLEVEL_OUTOF10: 9
PAINLEVEL_OUTOF10: 6
PAINLEVEL_OUTOF10: 6
PAINLEVEL_OUTOF10: 8
PAINLEVEL_OUTOF10: 0 - NO PAIN
PAINLEVEL_OUTOF10: 5 - MODERATE PAIN
PAINLEVEL_OUTOF10: 10 - WORST POSSIBLE PAIN
PAINLEVEL_OUTOF10: 4
PAINLEVEL_OUTOF10: 10 - WORST POSSIBLE PAIN

## 2025-02-13 ASSESSMENT — ACTIVITIES OF DAILY LIVING (ADL)
EFFECT OF PAIN ON DAILY ACTIVITIES: COMFORT/MOBILITY
ADL_ASSISTANCE: INDEPENDENT
EFFECT OF PAIN ON DAILY ACTIVITIES: OT TO TOLERANCE
HOME_MANAGEMENT_TIME_ENTRY: 24
EFFECT OF PAIN ON DAILY ACTIVITIES: COMFORT/MOBILITY
ADL_ASSISTANCE: INDEPENDENT

## 2025-02-13 NOTE — NURSING NOTE
This ICU RN/ANM spoke with the patient and  at bedside regarding a complaint from the  that there are family members calling the patient and/or nurses on the unit, and the family in confiding hurtful information regarding the patients history.  I have apologized and confirmed with the patient and  the sole method of contact.  Salvatore, the  is to be contacted regarding all aspects of her care.  A number we can reach Salvatore at was confirmed during this time.  If a friend or family calls the nursing station inquiring or wanting to share information; all calls should be directed to Salvatore.

## 2025-02-13 NOTE — PROGRESS NOTES
02/13/25 1702   Discharge Planning   Expected Discharge Disposition Home H        Revisited patient at bedside and explained PT/OT. She is refusing SNF and would like [facility]. Also patient said her PCP is Patricia Munoz CNP.

## 2025-02-13 NOTE — PROGRESS NOTES
"Subjective Data:  Extubated yesterday   Alert and oriented at present   Remembers events leading to hospitalization.     Telemetry SR/ST    Overnight Events:    Hemodynamically stable   Platelets 51  Creatinine 1.6      Objective Data:  Last Recorded Vitals:  Vitals:    02/13/25 1300 02/13/25 1400 02/13/25 1455 02/13/25 1500   BP: 114/79 118/87  130/84   Pulse: 93 82  87   Resp: 16 17  15   Temp:       TempSrc:       SpO2: 90% 94% 94% 93%   Weight:       Height:           Last Labs:  LABS:  CMP:  Results from last 7 days   Lab Units 02/13/25  1413 02/12/25  0419 02/11/25  0436 02/11/25  0147 02/10/25  1930 02/10/25  1722 02/10/25  1524   SODIUM mmol/L 144 145 142 140 146*  --  141   POTASSIUM mmol/L 4.5 4.8 5.3 5.0 3.1*  --  4.2   CHLORIDE mmol/L 112* 112* 114* 113* 108*  --  111*   CO2 mmol/L 25 25 22 24 28  --  23   ANION GAP mmol/L 12 13 11 8* 13  --  11   BUN mg/dL 30* 37* 28* 28* 26*  --  26*   CREATININE mg/dL 1.60* 1.87* 1.44* 1.41* 1.38*  --  1.33*   EGFR mL/min/1.73m*2 35* 29* 40* 41* 42*  --  44*   MAGNESIUM mg/dL  --   --  2.65*  --   --  1.54* 1.48*   ALBUMIN g/dL  --  3.2* 2.8*  --   --   --  3.2*   ALT U/L  --  37 36  --   --   --  37   AST U/L  --  45* 47*  --   --   --  51*   BILIRUBIN TOTAL mg/dL  --  1.2 1.2  --   --   --  1.2     CBC:  Results from last 7 days   Lab Units 02/13/25  0541 02/12/25  0419 02/11/25  0436 02/10/25  1930 02/10/25  1521   WBC AUTO x10*3/uL 9.8 18.8* 6.3 9.8 3.9*   HEMOGLOBIN g/dL 10.1* 12.8 11.8* 12.8 9.0*   HEMATOCRIT % 31.2* 41.2 33.8* 37.2 27.9*   PLATELETS AUTO x10*3/uL 51* 84* 56* 77* 51*   MCV fL 103* 106* 97 97 104*     COAG:   Results from last 7 days   Lab Units 02/10/25  1524   INR  1.4*     ABO: No results found for: \"ABO\"  HEME/ENDO:  Results from last 7 days   Lab Units 02/10/25  1524   TSH mIU/L 1.16      CARDIAC:   Results from last 7 days   Lab Units 02/10/25  1722 02/10/25  1524 02/10/25  1521   TROPHS ng/L 4 4  --    BNP pg/mL  --   --  111*   No results " "for input(s): \"CHOL\", \"LDLF\", \"LDL\", \"LDLCALC\", \"HDL\", \"TRIG\" in the last 72362 hours.     Imagine Results  NM Lung perfusion with spect/ct   Final Result   Heterogeneous perfusion within both lungs with a segmental perfusion   defect in the left lower lobe indicating intermediate probability for   pulmonary embolism.        The interpretation above is based on modified PIOPED II and PISAPED   criteria.        This study was analyzed and interpreted at [place], [place].        Signed by: Alberto Warren 2/12/2025 6:23 PM   Dictation workstation:   [id number]      Vascular US lower extremity venous duplex bilateral   Final Result   No sonographic evidence DVT in the bilateral lower extremities, given   limitations as above.        Fluid collection in the right popliteal fossa is incompletely   evaluated, but favored to represent a Baker's cyst.        MACRO:   None        Signed by: Denise Pineda 2/12/2025 6:03 PM   Dictation workstation:   [id number]      Cardiac Device Check - Inpatient         Transthoracic Echo (TTE) Complete   Final Result      XR chest abdomen for OG NG placement   Final Result   1.ET tube in place with the tip about 2.0 cm above the level of   the seven. Enteric tube in place with the tip in the right midabdomen   and could be in the gastric antrum or proximal duodenum.   2.Relatively gasless gastrointestinal pattern with no overt   evidence of bowel obstruction.   3.Bibasilar subsegmental atelectasis or scarring with possible   nodule in the right upper lobe versus artifact and possible nodule   versus scarring or atelectasis in the left base. Attention to these on   follow-up is suggested.   Signed by Amy Noe DO      CT head wo IV contrast   Final Result   No CT evidence for acute intracranial pathology.        Signed by: Blaine Perez 2/10/2025 5:49 PM   Dictation workstation:   [id number]      XR chest abdomen for OG NG placement   Final Result   1. " Mild opacification of the left lung base may reflect atelectasis   or small infiltrate.   2. The enteric tube distal tip projects at the expected location of   the gastric body.   3. The endotracheal tube distal tip projects 5.3 cm above the seven.        MACRO:   None        Signed by: Rachael James 2/10/2025 4:28 PM   Dictation workstation:   [id number]            Last I/O:  I/O last 3 completed shifts:  In: 1767.8 (18.3 mL/kg) [I.V.:1507.8 (15.6 mL/kg); NG/GT:160; IV Piggyback:100]  Out: 1385 (14.4 mL/kg) [Urine:1385 (0.4 mL/kg/hr)]  Weight: 96.5 kg     Past Cardiology Tests (Last 3 Years):  EKG:  ECG 12 lead 02/10/2025    ECG 12 lead 02/10/2025                            Echo:  Transthoracic Echo (TTE) Complete 02/11/2025   1. Poorly visualized anatomical structures due to suboptimal image quality.   2. Left ventricular ejection fraction is normal, by visual estimate at 60-65%.   3. Right ventricular volume overload.   4. There is normal right ventricular global systolic function.   5. Severely enlarged right ventricle.   6. Severe tricuspid regurgitation visualized.   7. Moderately elevated right ventricular systolic pressure.   8. Reported right ventricular systolic pressure may be underestimated due to severe tricuspid regurgitation.   9. The right atrium is moderately dilated.  10. There is moderate mitral annular calcification.  11. Moderate aortic valve stenosis.    4/10/2023- [facility]   - Technically difficult exam due to suboptimal positioning, body habitus and patient experiencing rib,back and hip pain.   - Exam indication: Palpitations; Edema   - The left ventricle is small. Left ventricular systolic function is normal. EF = 63 ± 5% (2D 4-ch.) Grade I left ventricular diastolic dysfunction.   - The right ventricle is normal in size. Right ventricular systolic function is normal.   - The right atrial cavity is dilated.   - There is moderate (2+ - 3+) tricuspid valve regurgitation.   -  Estimated right ventricular systolic pressure is 35 mmHg consistent with normal pulmonary artery pressures. Estimated right atrial pressure is 8 mmHg (although IVC not seen).   - Exam was compared with the prior CC echocardiographic exam performed on  9/6/2018.       9/6/2018- [facility]   CONCLUSIONS:   - Exam indication: Syncope; SOB   - The left ventricle is normal in size. Left ventricular systolic function is normal. EF = 55 ± 5% (2D biplane)   - The right ventricle is normal in size. Right ventricular systolic function is normal.   - There is moderately severe (3+) tricuspid valve regurgitation.   - The patient has not had a prior CC echocardiographic exam for comparison.       Ejection Fractions:  EF   Date/Time Value Ref Range Status   02/11/2025 03:37 PM 63 %      Cath:  Per documentation, left heart cath in 2010 in [place] was negative for CAD     Stress Test:  NM Cardiac Perf Stress/Pharm 11/21/2018   1. SPECT Perfusion Study: Normal.    2. There is no scintigraphic evidence for inducible ischemia.    3. No evidence of scarred myocardium.    4. Functional capacity N/A (pharmacological).    5. Left ventricle is normal in size. The left ventricle systolic function is normal.    6. Right ventricle is normal in size. The right ventricle systolic function is normal.    7. This is a low risk scan.        Cardiac Imaging:        Inpatient Medications:  Scheduled medications   Medication Dose Route Frequency    ALPRAZolam  0.25 mg oral Daily    aspirin  81 mg oral Daily    [Held by provider] gabapentin  400 mg oral TID    heparin (porcine)  5,000 Units subcutaneous q8h TANK    [Held by provider] lisinopril  20 mg oral Daily    And    [Held by provider] hydroCHLOROthiazide  12.5 mg oral Daily    levothyroxine  137 mcg oral Daily    lidocaine  1 patch transdermal Daily    ondansetron  4 mg intravenous Once    oxygen   inhalation Continuous - Inhalation    piperacillin-tazobactam  3.375 g intravenous q6h     polyethylene glycol  17 g oral Daily    psyllium  1 packet oral Daily     PRN medications   Medication    [Held by provider] ALPRAZolam     Continuous Medications   Medication Dose Last Rate       Physical Exam:  GENERAL: Extubated, alert and oriented x3   SKIN: warm, dry  CARDIAC: Regular rate and rhythm + murmurs  PULMONARY: Normal respiratory efforts, lungs clear to auscultation bilaterally.  ABDOMEN: soft, nondistended  EXTREMITIES: no lower extremity edema  NEURO: Alert and oriented      Assessment/Plan   Rashmi Oreilly is a 69 y.o. female with past medical history significant for Cardiac arrest secondary to ventricular fibrillation  in setting of electrolyte abnormalities  s/p  [facility] A[facility] placement 4/1/2010,  Hypertension, Hyperlipidemia,  Chronic hepatitis C with cirrhosis, Hypothyroidism s/p thyroidectomy for papillary thyroid cancer, Anxiety/depression, Panic disorder, Lyme disease 1995, Osteoarthritis s/p total knee arthroplasty, Drug and alcohol use. Presented with mental status change/overdose. Cardiology is consulted for AICD interrogation s/p brief cardiac arrest in ICU. Hx of cardiac arrest in 2010.       Home CV meds: aspirin 81 mg daily, Lisinopril-hydrochlorothiazide 20-12.5 mg daily      #Cardiac arrest   While in the ICU, she experienced cardiac arrest with documented bradycardia, pulselessness, and VT, requiring CPR, shock x 2,  epinephrine, bicarbonate and amiodarone, with subsequent ROSC   -Per documentation, her AICD was NABEEL in 2018. Also with evidence of ICD lead fracture.  AICD was initially placed 2010 after VF  arrest in setting of electrolyte abnormalities.  No documentation of generator change out.   -Device was unable to be interrogated 2/12/2024 as it was EOL  -Echocardiogram 2/11/2025 showed LVEF 60-65% no wall motion abnormalities right ventricle was severely enlarged with normal RV function moderate aortic stenosis and severe tricuspid regurgitation no pericardial effusion.      #RV dysfunction/Severe tricuspid valve regurgitation   Unclear etiology   Could be related to RV lead placement leading to severe tricuspid regurgitation.   VQ scan with intermediate probability for pulmonary embolism.  Unable to obtain CT angio  due to kidney dysfunction.  Unable to anticoagulate (platelets 51)    #Moderate Aortic stenosis   Outpatient surveillance     #Drug overdose     Recommendations   -Will make NPO after midnight in anticipation for possible right and left heart cath.  -Discussed with EP Dr. Romo. Due to documented evidence of prior AICD lead fracture, she will need device extraction which can only be done at [facility]. Will discuss further with EP after cath findings.      -Keep K >4 Mag >2. Continue to monitor on telemetry.   -Wean off oxygen as able     Code Status:  Full Code    Fernandez Dorantes, APRN-CNP

## 2025-02-13 NOTE — PROGRESS NOTES
Occupational Therapy    Evaluation/Treatment    Patient Name: Rashmi Oreilly  MRN: [id number]  Department: [facility]  Room: 429/429-A  Today's Date: 02/13/25  Time Calculation  Start Time: 0917  Stop Time: 0958  Time Calculation (min): 41 min       Assessment:  OT Assessment: Pt functioning below baseline due to deficits in standing/sitting balance, cognition (STM, attention), activity tolerance, and pain. Pt requiring skilled OT in order to return to [facility] and facilitate indep with ADLs/IADLs and transfers.  Prognosis: Good  Barriers to Discharge Home: No anticipated barriers  Evaluation/Treatment Tolerance: Patient tolerated treatment well, Patient limited by fatigue  Medical Staff Made Aware: Yes  End of Session Communication: Bedside nurse  End of Session Patient Position: Bed, 3 rail up, Alarm on  OT Assessment Results: Decreased ADL status, Decreased safe judgment during ADL, Decreased cognition, Decreased endurance, Decreased functional mobility, Decreased IADLs  Prognosis: Good  Barriers to Discharge: None  Evaluation/Treatment Tolerance: Patient tolerated treatment well, Patient limited by fatigue  Medical Staff Made Aware: Yes  Strengths: Ability to acquire knowledge, Access to adaptive/assistive products, Attitude of self  Barriers to Participation: Comorbidities, Insight into problems  Plan:  Treatment Interventions: ADL retraining, Functional transfer training, UE strengthening/ROM, Endurance training, Cognitive reorientation, Patient/family training, Equipment evaluation/education, Neuromuscular reeducation, Compensatory technique education  OT Frequency: 4 times per week  OT Discharge Recommendations: High intensity level of continued care  Equipment Recommended upon Discharge: Wheeled walker (AE To be assessed)  OT Recommended Transfer Status: Assist of 1, Moderate assist  OT - OK to Discharge: Yes  Treatment Interventions: ADL retraining, Functional transfer training, UE strengthening/ROM,  Endurance training, Cognitive reorientation, Patient/family training, Equipment evaluation/education, Neuromuscular reeducation, Compensatory technique education    Subjective   Current Problem:  1. Intentional drug overdose, initial encounter (Multi)        2. Acute respiratory failure, unspecified whether with hypoxia or hypercapnia        3. Cardiac arrest  Transthoracic Echo (TTE) Complete    Transthoracic Echo (TTE) Complete      4. Acne vulgaris  Transthoracic Echo (TTE) Complete    Transthoracic Echo (TTE) Complete      5. ICD (implantable cardioverter-defibrillator) in place  Cardiac Device Check - Inpatient    Cardiac Device Check - Inpatient      6. Leg swelling  Vascular US lower extremity venous duplex bilateral    Vascular US lower extremity venous duplex bilateral        General:   OT Received On: 02/13/25  General  Reason for Referral: Pt is a 70 yo female presenting following intentional drug overdose resulting in cardiac arrest requiring CPR and intubation due to respiratory failure, hypoxemia and suspected aspiration. Pt extubated on 2/12.  Referred By: Yevgeniy Zelaya DO  Past Medical History Relevant to Rehab:   Past Medical History:   Diagnosis Date    Anxiety     Cirrhosis (Multi)     Depression     Disease of thyroid gland     Hepatitis C     Spondylolysis, lumbar region        Family/Caregiver Present: No  Co-Treatment: PT  Co-Treatment Reason: for maximal pt safety and participation  Prior to Session Communication: Bedside nurse  Patient Position Received: Bed, 3 rail up, Alarm on (sitter)  Preferred Learning Style: auditory, kinesthetic  General Comment: Pt pleasant and agreeable to PT/OT evaluations. Pt requiring increased time for processing due to cognitive deficits.   Precautions:  Medical Precautions: Fall precautions (Rib precautions for comfort)     Date/Time Vitals Session Patient Position Pulse Resp SpO2 BP MAP (mmHg)    02/13/25 0916 --  --  97  --  94 %  127/73  88     02/13/25 0925  "--  --  93  20  93 %  --  --     02/13/25 1100 --  --  --  --  96 %  129/92  100           Vital Signs Comment: VSS throughout     Pain:  Pain Assessment  Pain Assessment: 0-10  0-10 (Numeric) Pain Score: 10 - Worst possible pain  Pain Type: Acute pain  Pain Location: Chest (ribs/CPR location, RN aware)  Pain Descriptors: Aching, Cramping  Pain Onset: Ongoing  Clinical Progression: Not changed  Effect of Pain on Daily Activities: OT to tolerance  Pain Interventions: Repositioned, Distraction, Guided imagery, Emotional support, Environmental changes  Response to Interventions: Relief    Objective   Cognition:  Overall Cognitive Status: Impaired  Orientation Level: Disoriented to time (pt unable to recall month, reporting \"I was wondering that, Janurary?\" with cues.)  Attention: Exceptions to WFL  Alternating Attention: Impaired  Selective Attention: Impaired (pt with decreased attention to activities and OT evaluation questions)  Memory: Exceptions to WFL  Short-Term Memory: Impaired  Memory Comments: deficits noted in SBT and  disorientation to month  Problem Solving: Exceptions to WFL  Safety/Judgement: Exceptions to WFL  Insight: Mild  Flexibility of Thought: Reduced flexibility  Processing Speed: Delayed (pt requiring increased time to state numbers 20-0 and months in reverse order)  Cognition Test Scores  Cognition Tests: Cognition Test Performed  SBT Test Score: 5 (questionable impairment)        Home Living:  Type of Home: House  Lives With:  (Mother \"Chiquita\")  Home Adaptive Equipment: Walker rolling or standard  Home Layout: Two level, Able to live on main level with bedroom/bathroom  Home Access: Stairs to enter with rails  Entrance Stairs-Rails: Both  Entrance Stairs-Number of Steps: 2  Bathroom Shower/Tub: Walk-in shower  Bathroom Toilet:  (pt reports her toilets are \"taller\")  Bathroom Equipment: Shower chair with back  Home Living Comments: pt is somewhat a poor historian  Prior Function:  Level of " [place]: Independent with ADLs and functional transfers, Independent with homemaking with ambulation  Receives Help From:  (pt reports she assists her mother)  ADL Assistance: Independent  Homemaking Assistance: Independent  Ambulatory Assistance: Independent (no device in the home, FWW outside the home)  Vocational: Retired (teacher)  Leisure: pt used to ride horses  Prior Function Comments: indep with ADLs/IADLs, drives     ADL:  Grooming Assistance: Minimal  Activities of Daily Living: Grooming  Grooming Level of Assistance: Minimum assistance  Grooming Where Assessed: Bed level  Grooming Comments: VC's to sequence task, pt competing bed level at end of session    LE Dressing  LE Dressing: Yes  Sock Level of Assistance: Dependent  LE Dressing Where Assessed: Bed level  LE Dressing Comments: pt unable to bend forward due to rib.chest pain in bed, pt with difficulty bring B LE's towards body to fully reach foot to don sock, pt requiring total A. Pt would benefit from use of AE in following sessions.  Activity Tolerance:  Endurance: Tolerates 10 - 20 min exercise with multiple rests     Bed Mobility/Transfers: Bed Mobility  Bed Mobility: Yes  Bed Mobility 1  Bed Mobility 1: Supine to sitting  Level of Assistance 1: Maximum assistance  Bed Mobility Comments 1: pt requirig max VC's, pt nervous about transfer due to chest discomfort requiring max A to elevate trunk and bring B LE's off bed  Bed Mobility 2  Bed Mobility  2: Sitting to supine  Level of Assistance 2: Moderate assistance  Bed Mobility Comments 2: pt instructed in logroll technique for comfort, assist to lower trunk and lift B LE's into the bed    Transfers  Transfer: Yes  Transfer 1  Technique 1: Sit to stand, Stand to sit  Transfer Device 1: Gait belt, Walker  Transfer Level of Assistance 1: Moderate assistance, +2, Moderate tactile cues, Moderate verbal cues  Trials/Comments 1: cues for hand placement, assist due to weakness, flexed  posture  Transfers 2  Technique 2: Sit to stand, Stand to sit  Transfer Device 2: Gait belt, Walker  Transfer Level of Assistance 2: Minimum assistance, +2, Maximum tactile cues, Maximum verbal cues  Trials/Comments 2: cues for safety awareness, pt progressing to min A       Sitting Balance:  Static Sitting Balance  Static Sitting-Balance Support: Feet supported  Static Sitting-Level of Assistance: Close supervision, Contact guard  Static Sitting-Comment/Number of Minutes: CGA-SUP, cues for posture  Dynamic Sitting Balance  Dynamic Sitting-Balance Support: Feet supported  Dynamic Sitting-Level of Assistance: Contact guard, Close supervision  Dynamic Sitting-Balance: Forward lean  Standing Balance:  Static Standing Balance  Static Standing-Balance Support: Bilateral upper extremity supported  Static Standing-Level of Assistance: Minimum assistance  Static Standing-Comment/Number of Minutes: min steadying assist     Vision:Vision - Basic Assessment  Current Vision: No visual deficits  Sensation:  Light Touch: No apparent deficits  Strength:  Strength Comments: TODD WALKER's WFL, 4/5 MMT     Perception:  Inattention/Neglect: Appears intact  Coordination:  Movements are Fluid and Coordinated: Yes   Hand Function:  Hand Function  Gross Grasp: Functional  Coordination: Functional  Extremities: RUE   RUE : Within Functional Limits and LUE   LUE: Within Functional Limits    Outcome Measures: [facility] Daily Activity  Putting on and taking off regular lower body clothing: Total  Bathing (including washing, rinsing, drying): A lot  Putting on and taking off regular upper body clothing: A little  Toileting, which includes using toilet, bedpan or urinal: A lot  Taking care of personal grooming such as brushing teeth: A little  Eating Meals: A little  Daily Activity - Total Score: 14      Education Documentation  Handouts, taught by Viridiana Miller OT at 2/13/2025 11:09 AM.  Learner: Patient  Readiness: Acceptance  Method: Explanation,  Demonstration  Response: Needs Reinforcement    Body Mechanics, taught by Viridiana Miller OT at 2/13/2025 11:09 AM.  Learner: Patient  Readiness: Acceptance  Method: Explanation, Demonstration  Response: Needs Reinforcement    Precautions, taught by Viridiana Miller OT at 2/13/2025 11:09 AM.  Learner: Patient  Readiness: Acceptance  Method: Explanation, Demonstration  Response: Needs Reinforcement    Home Exercise Program, taught by Viridiana Miller OT at 2/13/2025 11:09 AM.  Learner: Patient  Readiness: Acceptance  Method: Explanation, Demonstration  Response: Needs Reinforcement    ADL Training, taught by Viridiana Miller OT at 2/13/2025 11:09 AM.  Learner: Patient  Readiness: Acceptance  Method: Explanation, Demonstration  Response: Needs Reinforcement    Education Comments  No comments found.           Goals:  Encounter Problems       Encounter Problems (Active)       ADLs       Patient will perform UB and LB bathing with set-up level of assistance.       Start:  02/13/25    Expected End:  02/27/25            Patient with complete upper body dressing with modified independent level of assistance donning and doffing all UE clothes .       Start:  02/13/25    Expected End:  02/27/25            Patient with complete lower body dressing with modified independent level of assistance donning and doffing all LE clothes  with PRN adaptive equipment.       Start:  02/13/25    Expected End:  02/27/25            Patient will complete daily grooming tasks brushing teeth and washing face/hair with modified independent level of assistance and PRN adaptive equipment while standing.       Start:  02/13/25    Expected End:  02/27/25               COGNITION/SAFETY       Patient will follow 100% Complex commands to allow improved ADL performance.       Start:  02/13/25    Expected End:  02/27/25            Patient will demonstrated orientation x 4 with no cues.       Start:  02/13/25    Expected End:  02/27/25       ORIENTATION             MOBILITY       Patient will perform Functional mobility mod  Household distances/Community Distances with supervision level of assistance and least restrictive device in order to improve safety and functional mobility.       Start:  02/13/25    Expected End:  02/27/25               TRANSFERS       Patient will complete sit to stand transfer with supervision level of assistance and least restrictive device in order to improve safety and prepare for out of bed mobility.       Start:  02/13/25    Expected End:  02/27/25

## 2025-02-13 NOTE — RESEARCH NOTES
Artificial Intelligence Monitoring in Nursing (AIMS Nursing) Study    Principle Investigator - Dr. Reji Orta  Research Coordinator - David Cordoba RN     Patient Name - Rashmi Oreilly  Date - 2/13/2025 8:57 AM  Location - 4th Floor ICU [facility]    Rashmi Oreilly was approached by David Cordoba RN to talk about participating in the AIMS Nursing Study. The patient was not able to be approached, a research coordinator will come back at a later time. Study protocol was followed and patient was given study contact information.     David Cordoba RN

## 2025-02-13 NOTE — CARE PLAN
The patient's goals for the shift include sleep  The clinical goals for the shift include patient will remain HDS throughout this shift

## 2025-02-13 NOTE — PROGRESS NOTES
Physical Therapy    Physical Therapy Evaluation & Treatment    Patient Name: Rashmi Oreilly  MRN: [id number]  Department: [facility]  Room: 429/429-A  Today's Date: 2/13/2025   Time Calculation  Start Time: 0916  Stop Time: 0953  Time Calculation (min): 37 min    Assessment/Plan   PT Assessment  PT Assessment Results: Decreased strength, Decreased endurance, Impaired balance, Decreased mobility  Rehab Prognosis: Good  Barriers to Discharge Home: Caregiver assistance, Physical needs  Caregiver Assistance: Patient lives alone and/or does not have reliable caregiver assistance  Physical Needs: Stair navigation into home limited by function/safety, In-home setup navigation limited by function/safety, Ambulating household distances limited by function/safety, 24hr mobility assistance needed, 24hr ADL assistance needed  Evaluation/Treatment Tolerance: Patient tolerated treatment well, Patient limited by fatigue, Patient limited by pain  Medical Staff Made Aware: Yes  Strengths: Ability to acquire knowledge, Access to adaptive/assistive products, Attitude of self  Barriers to Participation: Comorbidities, Insight into problems  End of Session Communication: Bedside nurse  Assessment Comment: Pt demonstrating deficits in generalized strength, endurance and balance as well as increased pain resulting in impaired functional mobility, gait and self care. Due to the impairments listed above, pt would benefit from skilled physical therapy services in acute care setting as well as additional therapy in HIGH intensity therapy setting with 24/7 supervision and assistance  End of Session Patient Position: Alarm on, Bed, 3 rail up   IP OR SWING BED PT PLAN  Inpatient or Swing Bed: Inpatient  PT Plan  Treatment/Interventions: Bed mobility, Transfer training, Gait training, Stair training, Balance training, Neuromuscular re-education, Strengthening, Endurance training, Therapeutic exercise, Therapeutic activity, Home exercise  "program  PT Plan: Ongoing PT  PT Frequency: 4 times per week  PT Discharge Recommendations: Moderate intensity level of continued care  Equipment Recommended upon Discharge:  (TBD - owns FWW)  PT Recommended Transfer Status: Assist x1, Assist x2  PT - OK to Discharge: Yes (PT POC initiated this date)      Subjective     General Visit Information:  General  Reason for Referral: Pt is a 70 yo female presenting following intentional drug overdose resulting in cardiac arrest requiring CPR and intubation due to respiratory failure, hypoxemia and suspected aspiration. Pt extubated on 2/12.  Referred By: Yevgeniy Zelaya DO  Past Medical History Relevant to Rehab:   Past Medical History:   Diagnosis Date    Anxiety     Cirrhosis (Multi)     Depression     Disease of thyroid gland     Hepatitis C     Spondylolysis, lumbar region        Family/Caregiver Present: No  Co-Treatment: OT  Co-Treatment Reason: for maximal pt safety and participation  Prior to Session Communication: Bedside nurse  Patient Position Received: Bed, 3 rail up, Alarm on (sitter present)  General Comment: Pt pleasant and agreeable to PT/OT evaluations. Pt requiring increased time for processing as well as tangential tendency requiring frequent cues to redirect to therapy activities  Home Living:  Home Living  Type of Home: House  Lives With: Parent(s) (mother lives with her)  Home Adaptive Equipment: Walker rolling or standard, Cane  Home Layout: Two level (1st floor set up. Reports has not gone up stairs since a prior total hip replacement that \"did not go well\")  Home Access: Stairs to enter with rails  Entrance Stairs-Rails: Both  Entrance Stairs-Number of Steps: 2  Bathroom Shower/Tub: Walk-in shower  Bathroom Toilet: Handicapped height  Bathroom Equipment: Grab bars in shower, Shower chair with back  Prior Level of Function:  Prior Function Per Pt/Caregiver Report  Level of Nelson: Independent with ADLs and functional transfers, Independent with " homemaking with ambulation  ADL Assistance: Independent  Homemaking Assistance: Independent  Ambulatory Assistance: Independent (no device within home. FWW outside of home)  Vocational: Retired (teacher)  Prior Function Comments: (-) falls. (+) driving  Precautions:  Precautions  Medical Precautions: Fall precautions     Date/Time Vitals Session Patient Position Pulse Resp SpO2 BP MAP (mmHg)    02/13/25 1000 --  --  --  --  --  121/94  103     02/13/25 1100 --  --  --  --  96 %  129/92  100     02/13/25 1146 --  --  --  --  93 %  --  --                Objective   Pain:  Pain Assessment  Pain Assessment: 0-10  0-10 (Numeric) Pain Score: 10 - Worst possible pain  Pain Type: Acute pain  Pain Location: Chest (Ribs/Location of CPR)  Pain Orientation: Mid  Pain Interventions: Ambulation/increased activity, Repositioned  Cognition:  Cognition  Overall Cognitive Status:  (Mild lethargy. Tangential)  Orientation Level: Disoriented to time (Pt unable to state current month)  Attention: Exceptions to WFL (requires VCs to redirect to tasks/questions)  Memory: Exceptions to WFL (mild deficit)  Safety/Judgement: Exceptions to WFL  Insight: Mild  Impulsive: Within functional limits  Processing Speed: Delayed    General Assessments:  Activity Tolerance  Endurance: Tolerates 30 min exercise with multiple rests    Sensation  Light Touch: No apparent deficits    Coordination  Movements are Fluid and Coordinated: Yes    Static Sitting Balance  Static Sitting-Balance Support: No upper extremity supported, Feet supported  Static Sitting-Level of Assistance: Minimum assistance, Contact guard, Close supervision (initial min A due to lateral lean progressing to CGA with brief periods of SBA)  Static Sitting-Comment/Number of Minutes: 10 min    Static Standing Balance  Static Standing-Balance Support: Bilateral upper extremity supported (FWW)  Static Standing-Level of Assistance: Contact guard  Static Standing-Comment/Number of Minutes: 2  min  Functional Assessments:  Bed Mobility  Bed Mobility: Yes  Bed Mobility 1  Bed Mobility 1: Supine to sitting  Level of Assistance 1: Maximum assistance, +2  Bed Mobility Comments 1: Assist x2 to manage BLEs and trunk. Pt fearful of mobility due to rib pain  Bed Mobility 2  Bed Mobility  2: Sitting to supine  Level of Assistance 2: Moderate assistance (x2)  Bed Mobility Comments 2: pt instructed in logroll technique for comfort, assist to lower trunk and lift B LE's into the bed    Transfers  Transfer: Yes  Transfer 1  Technique 1: Sit to stand, Stand to sit  Transfer Device 1: Walker, Gait belt  Transfer Level of Assistance 1: Moderate assistance, +2  Trials/Comments 1: cues for hand placement, assist due to weakness, flexed posture  Transfers 2  Technique 2: Sit to stand, Stand to sit  Transfer Device 2: Gait belt, Walker  Transfer Level of Assistance 2: Minimum assistance, +2  Trials/Comments 2: cues for safety awareness, pt progressing to min A    Ambulation/Gait Training  Ambulation/Gait Training Performed: Yes  Ambulation/Gait Training 1  Surface 1: Level tile  Device 1: Rolling walker  Gait Support Devices: Gait belt  Assistance 1: Minimum assistance  Quality of Gait 1: Decreased step length, Forward flexed posture  Comments/Distance (ft) 1: x2ft (sidestepping at EOB for safety. Pt limited by fatigue, rib pain and mild lightheadedness)  Extremity/Trunk Assessments:  RLE   RLE : Within Functional Limits  LLE   LLE : Within Functional Limits  Treatments:  Therapeutic Activity  Therapeutic Activity Performed: Yes  Therapeutic Activity 1: Completed additional static sitting activities (ranging SBA to min A) as well as STS transitions from bed (progressing modx2 to min x2) with static standing ~1 min with CGA using FWW  Outcome Measures:  [facility] Basic Mobility  Turning from your back to your side while in a flat bed without using bedrails: A lot  Moving from lying on your back to sitting on the side of a flat  bed without using bedrails: A lot  Moving to and from bed to chair (including a wheelchair): A lot  Standing up from a chair using your arms (e.g. wheelchair or bedside chair): A lot  To walk in hospital room: Total  Climbing 3-5 steps with railing: Total  Basic Mobility - Total Score: 10    Encounter Problems       Encounter Problems (Active)       Mobility       LTG - Patient will navigate 2 steps with rails/device       Start:  02/13/25    Expected End:  02/27/25       CGA         STG - Patient will ambulate       Start:  02/13/25    Expected End:  02/27/25       SBA using FWW >50ft            PT Transfers       STG - Patient will perform bed mobility       Start:  02/13/25    Expected End:  02/27/25       Min A         STG - Patient will transfer sit to and from stand       Start:  02/13/25    Expected End:  02/27/25       Min A                Education Documentation  Precautions, taught by Joseu Spann, PT at 2/13/2025 11:50 AM.  Learner: Patient  Readiness: Acceptance  Method: Explanation  Response: Verbalizes Understanding    Body Mechanics, taught by Josue Spann, PT at 2/13/2025 11:50 AM.  Learner: Patient  Readiness: Acceptance  Method: Explanation  Response: Verbalizes Understanding    Mobility Training, taught by Josue Spann, PT at 2/13/2025 11:50 AM.  Learner: Patient  Readiness: Acceptance  Method: Explanation  Response: Verbalizes Understanding    Education Comments  No comments found.

## 2025-02-13 NOTE — PROGRESS NOTES
"   [id number]   Discharge Planning   Assistance Needed cocaine abuse recovery resources   Expected Discharge Disposition Home     Met with patient at bedside. She states that she bought cocaine from someone she has bought from over 50 times and has never had any issues. This time it was laced with fetanyl and was expressed that she feel betrayed by this. Per patient she has been using cocaine for many many years often triggered by her mother's actions. Patient's mother lives with her and her . Appears there is a long complicated relationship history with her parents (her dad  9 years ago). \"Many many years ago\" patient worked as a  and reports that she was passionate about her work. Patient reports she has gone to 3 or 4 treatment programs over the years and does not feel any of them were successful. Patient declines treatment at this time, patient also declining SNF or facility placement. Patient agreeable to home with [facility].  at bedside and aware of plan.  requested that we discuss again prior to discharge. SW will follow.  "

## 2025-02-13 NOTE — PROGRESS NOTES
Critical Care Medicine Progress Note     Impression/Plan:Rashmi Oreilly is a 68 y/o f pmhx of HCV cirrhosis, OA s/p Left SANDI, ICD placement for VT, depression, anxiety on Alprazolam at home, who is admitted to the ICU with acute respiratory failure with hypoxemia d/t aspiration (most likely) in the setting of airway compromise as a consequence of toxic encephalopathy d/t cocaine, fentanyl with likely less contribution from benzodiazepines as patient takes Alprazolam at home.      Timeline:   2/10 - cardiopulmonary arrest, manifesting as PEA. During ACLS, patient was noted to have VT x 2 prompting shock. Amiodarone bolus given x 1, along with epinephrine per ACLS guidelines. Family notified. Vasopressors have since been weaned. Of note, on further investigation into patient's chart, patient did not follow-up with her cardiologist as advised for AICD up-grade.      On-going issues include the following:   Encephalopathy - multifactorial d/t polysuybstance overdose (cocaine and fentanyl most likely - POA)) coupled with anoxic ischemic component d/t PEA arrest with subsequent VT during ACLS resuscitation (~10 min). AICD is non-functioning on interrogation. Resolved   RV dysfunction on Echo post cardiopulmonary arrest: Venous doppler for DVT negative. VQ Scan with heterogeneous perfusion with segmental defect in LLL, w/intermediate Probability for PE. Prior hx of AICD lead Fx in past? RV dysfunction may be explained by PEA arrest alone in the setting of a non-functioning AICD.   Acute respiratory failure d/t air-way compromise (POA) - resolving. Liberated from vent support at 1200 on 2/12  Aspiration pneumonitis d/t polysubstance overdose - clinically improving.  On Pip-tazo  KT stage2- rising Cr and decreasing urine out-put, likely related to periodic decreases in MAP on 2/11 while on propofol for sedation. MAP has been stable            Neuro  - A-F bundle   - Sleep Hygiene measures: appropriate daytime  stimulation/physical activity. Lights out at 2100, avoidance of night time lab draws/night baths, avoidance of delirium provoking medications  - resume home dose Alprazolam 0.25 mg po daily starting on 2/13        CV  - Consider CT PE if GFR permits   - goal MAP 65 mm Hg           Pulm/ID:   - Monitor very closely   - empiric Pip-tazo and follow sputum Cx's            Renal/electrolyte/acid-base:   - avoid nephrotoxins as much as possible   - replace electrolytes as indicated   - maintain armendariz for accurate I/O         GI/Nutrition   - reg diet            Heme   - DVT ppx: SCD's, TANK     Endocrine  - goal serum glucose 140-180 mg/dL      Musculoskeletal/skin  - skin protective measures   - PT/OT when able      Family contact:  Margaret Baer (mother) and Johnson ()             Critical Care time: 80 min       Addendum: 1636 - will hold off on AC d/t thrombocytopenia. Renal function, while recovering, does not permit contrast at this time. Prioritizing Renal recovery for RHC and LHC tentatively planned for 2/14, provided renal function permits, to eval for evidence of CAD, to explain recurrent episodes of VT. Additionally, given AICD RV lead Fx (which may directly cause/exacerbate Tricuspid regurgitation), will have to be extracted at [facility]. Patient's spouse, Salvatore, updated at bedside.                  Objective   Vitals:  Most Recent:  Vitals:    02/13/25 0736   BP:    Pulse:    Resp:    Temp:    SpO2: 95%       24hr Min/Max:  Temp  Min: 36.4 °C (97.5 °F)  Max: 37.4 °C (99.3 °F)  Pulse  Min: 66  Max: 90  BP  Min: 80/57  Max: 134/86  Resp  Min: 12  Max: 25  SpO2  Min: 92 %  Max: 99 %    LDA:  Urethral Catheter 16 Fr. (Active)   Placement Date/Time: 02/10/25 1547   Placed by: sabrina  Tube Size (Fr.): 16 Fr.  Catheter Balloon Size: 10 mL   Number of days: 2             Hemodynamic parameters for last 24 hours:       I/O:  I/O last 2 completed shifts:  In: 1767.8 (18.3 mL/kg) [I.V.:1507.8 (15.6 mL/kg); NG/GT:160; IV  "Piggyback:100]  Out: 1095 (11.3 mL/kg) [Urine:1095 (0.5 mL/kg/hr)]  Weight: 96.5 kg     Physical Exam:   /62   Pulse 86   Temp 36.8 °C (98.3 °F)   Resp 17   Ht 1.651 m (5' 5\")   Wt 96.5 kg (212 lb 11.2 oz)   SpO2 95%   BMI 35.40 kg/m²       Gen: awake, alert, cooperative, NAD,   Psych: feels anxious often but denies ever having suicidal ideation, denies taking illicit drugs with intent to harm herself  Neuro: GCS E4, V5, M6, no focal deficits   CV:  S1s2  Pulm: CTAB  Chest: neg accessory muscle recruitment   Abd: soft, non-tender   Ext: well perfused, cap refill < 2 sec  Skin: warm and dry, no mottling or rash          "

## 2025-02-14 ENCOUNTER — APPOINTMENT (OUTPATIENT)
Dept: CARDIOLOGY | Facility: HOSPITAL | Age: 70
DRG: 917 | End: 2025-02-14
Payer: MEDICARE

## 2025-02-14 PROBLEM — I27.20 PULMONARY HYPERTENSION (MULTI): Status: ACTIVE | Noted: 2025-02-10

## 2025-02-14 PROBLEM — I46.9 CARDIAC ARREST: Status: ACTIVE | Noted: 2025-02-10

## 2025-02-14 PROBLEM — I07.1 SEVERE TRICUSPID REGURGITATION BY PRIOR ECHOCARDIOGRAM: Status: ACTIVE | Noted: 2025-02-10

## 2025-02-14 PROBLEM — Z95.810 S/P ICD (INTERNAL CARDIAC DEFIBRILLATOR) PROCEDURE: Status: ACTIVE | Noted: 2025-02-10

## 2025-02-14 LAB
ALBUMIN SERPL BCP-MCNC: 2.8 G/DL (ref 3.4–5)
ALP SERPL-CCNC: 94 U/L (ref 33–136)
ALT SERPL W P-5'-P-CCNC: 27 U/L (ref 7–45)
ANION GAP SERPL CALC-SCNC: 9 MMOL/L (ref 10–20)
AST SERPL W P-5'-P-CCNC: 34 U/L (ref 9–39)
BACTERIA SPEC RESP CULT: NORMAL
BASOPHILS # BLD AUTO: 0.06 X10*3/UL (ref 0–0.1)
BASOPHILS NFR BLD AUTO: 0.7 %
BILIRUB SERPL-MCNC: 1.9 MG/DL (ref 0–1.2)
BUN SERPL-MCNC: 25 MG/DL (ref 6–23)
CALCIUM SERPL-MCNC: 8.7 MG/DL (ref 8.6–10.3)
CHLORIDE SERPL-SCNC: 113 MMOL/L (ref 98–107)
CO2 SERPL-SCNC: 27 MMOL/L (ref 21–32)
CREAT SERPL-MCNC: 1.41 MG/DL (ref 0.5–1.05)
EGFRCR SERPLBLD CKD-EPI 2021: 41 ML/MIN/1.73M*2
EOSINOPHIL # BLD AUTO: 0.48 X10*3/UL (ref 0–0.7)
EOSINOPHIL NFR BLD AUTO: 5.6 %
ERYTHROCYTE [DISTWIDTH] IN BLOOD BY AUTOMATED COUNT: 14.6 % (ref 11.5–14.5)
GLUCOSE BLD MANUAL STRIP-MCNC: 105 MG/DL (ref 74–99)
GLUCOSE SERPL-MCNC: 94 MG/DL (ref 74–99)
GRAM STN SPEC: NORMAL
GRAM STN SPEC: NORMAL
HCT VFR BLD AUTO: 31.9 % (ref 36–46)
HGB BLD-MCNC: 10.1 G/DL (ref 12–16)
IMM GRANULOCYTES # BLD AUTO: 0.04 X10*3/UL (ref 0–0.7)
IMM GRANULOCYTES NFR BLD AUTO: 0.5 % (ref 0–0.9)
LYMPHOCYTES # BLD AUTO: 1.81 X10*3/UL (ref 1.2–4.8)
LYMPHOCYTES NFR BLD AUTO: 21 %
MCH RBC QN AUTO: 32.8 PG (ref 26–34)
MCHC RBC AUTO-ENTMCNC: 31.7 G/DL (ref 32–36)
MCV RBC AUTO: 104 FL (ref 80–100)
MONOCYTES # BLD AUTO: 0.56 X10*3/UL (ref 0.1–1)
MONOCYTES NFR BLD AUTO: 6.5 %
NEUTROPHILS # BLD AUTO: 5.65 X10*3/UL (ref 1.2–7.7)
NEUTROPHILS NFR BLD AUTO: 65.7 %
NRBC BLD-RTO: 0.2 /100 WBCS (ref 0–0)
PLATELET # BLD AUTO: 57 X10*3/UL (ref 150–450)
POTASSIUM SERPL-SCNC: 3.7 MMOL/L (ref 3.5–5.3)
PROT SERPL-MCNC: 5.9 G/DL (ref 6.4–8.2)
RBC # BLD AUTO: 3.08 X10*6/UL (ref 4–5.2)
SODIUM SERPL-SCNC: 145 MMOL/L (ref 136–145)
WBC # BLD AUTO: 8.6 X10*3/UL (ref 4.4–11.3)

## 2025-02-14 PROCEDURE — 99153 MOD SED SAME PHYS/QHP EA: CPT | Performed by: INTERNAL MEDICINE

## 2025-02-14 PROCEDURE — 2500000001 HC RX 250 WO HCPCS SELF ADMINISTERED DRUGS (ALT 637 FOR MEDICARE OP)

## 2025-02-14 PROCEDURE — 2500000001 HC RX 250 WO HCPCS SELF ADMINISTERED DRUGS (ALT 637 FOR MEDICARE OP): Performed by: INTERNAL MEDICINE

## 2025-02-14 PROCEDURE — C1769 GUIDE WIRE: HCPCS | Performed by: INTERNAL MEDICINE

## 2025-02-14 PROCEDURE — 84075 ASSAY ALKALINE PHOSPHATASE: CPT | Performed by: INTERNAL MEDICINE

## 2025-02-14 PROCEDURE — 2500000005 HC RX 250 GENERAL PHARMACY W/O HCPCS: Performed by: NURSE PRACTITIONER

## 2025-02-14 PROCEDURE — 82947 ASSAY GLUCOSE BLOOD QUANT: CPT

## 2025-02-14 PROCEDURE — 2020000001 HC ICU ROOM DAILY

## 2025-02-14 PROCEDURE — 85025 COMPLETE CBC W/AUTO DIFF WBC: CPT | Performed by: NURSE PRACTITIONER

## 2025-02-14 PROCEDURE — 2500000004 HC RX 250 GENERAL PHARMACY W/ HCPCS (ALT 636 FOR OP/ED): Performed by: INTERNAL MEDICINE

## 2025-02-14 PROCEDURE — 99232 SBSQ HOSP IP/OBS MODERATE 35: CPT

## 2025-02-14 PROCEDURE — 2500000001 HC RX 250 WO HCPCS SELF ADMINISTERED DRUGS (ALT 637 FOR MEDICARE OP): Performed by: NURSE PRACTITIONER

## 2025-02-14 PROCEDURE — 93458 L HRT ARTERY/VENTRICLE ANGIO: CPT | Performed by: INTERNAL MEDICINE

## 2025-02-14 PROCEDURE — 93005 ELECTROCARDIOGRAM TRACING: CPT

## 2025-02-14 PROCEDURE — 2500000005 HC RX 250 GENERAL PHARMACY W/O HCPCS: Performed by: INTERNAL MEDICINE

## 2025-02-14 PROCEDURE — 2500000002 HC RX 250 W HCPCS SELF ADMINISTERED DRUGS (ALT 637 FOR MEDICARE OP, ALT 636 FOR OP/ED): Performed by: NURSE PRACTITIONER

## 2025-02-14 PROCEDURE — 99152 MOD SED SAME PHYS/QHP 5/>YRS: CPT | Performed by: INTERNAL MEDICINE

## 2025-02-14 PROCEDURE — 2500000004 HC RX 250 GENERAL PHARMACY W/ HCPCS (ALT 636 FOR OP/ED): Performed by: NURSE PRACTITIONER

## 2025-02-14 PROCEDURE — 99291 CRITICAL CARE FIRST HOUR: CPT | Performed by: INTERNAL MEDICINE

## 2025-02-14 PROCEDURE — 4A023N8 MEASUREMENT OF CARDIAC SAMPLING AND PRESSURE, BILATERAL, PERCUTANEOUS APPROACH: ICD-10-PCS | Performed by: INTERNAL MEDICINE

## 2025-02-14 PROCEDURE — 2500000004 HC RX 250 GENERAL PHARMACY W/ HCPCS (ALT 636 FOR OP/ED): Performed by: STUDENT IN AN ORGANIZED HEALTH CARE EDUCATION/TRAINING PROGRAM

## 2025-02-14 PROCEDURE — 93460 R&L HRT ART/VENTRICLE ANGIO: CPT | Performed by: INTERNAL MEDICINE

## 2025-02-14 PROCEDURE — C1894 INTRO/SHEATH, NON-LASER: HCPCS | Performed by: INTERNAL MEDICINE

## 2025-02-14 PROCEDURE — B2111ZZ FLUOROSCOPY OF MULTIPLE CORONARY ARTERIES USING LOW OSMOLAR CONTRAST: ICD-10-PCS | Performed by: INTERNAL MEDICINE

## 2025-02-14 PROCEDURE — C1760 CLOSURE DEV, VASC: HCPCS | Performed by: INTERNAL MEDICINE

## 2025-02-14 PROCEDURE — C1887 CATHETER, GUIDING: HCPCS | Performed by: INTERNAL MEDICINE

## 2025-02-14 PROCEDURE — 2720000007 HC OR 272 NO HCPCS: Performed by: INTERNAL MEDICINE

## 2025-02-14 PROCEDURE — 2550000001 HC RX 255 CONTRASTS: Performed by: INTERNAL MEDICINE

## 2025-02-14 PROCEDURE — 36415 COLL VENOUS BLD VENIPUNCTURE: CPT | Performed by: NURSE PRACTITIONER

## 2025-02-14 RX ORDER — HYDROXYZINE HYDROCHLORIDE 25 MG/1
25 TABLET, FILM COATED ORAL ONCE
Status: DISCONTINUED | OUTPATIENT
Start: 2025-02-14 | End: 2025-02-14

## 2025-02-14 RX ORDER — DIPHENHYDRAMINE HCL 25 MG
25 CAPSULE ORAL EVERY 6 HOURS PRN
Status: DISCONTINUED | OUTPATIENT
Start: 2025-02-14 | End: 2025-02-14

## 2025-02-14 RX ORDER — FENTANYL CITRATE 50 UG/ML
INJECTION, SOLUTION INTRAMUSCULAR; INTRAVENOUS AS NEEDED
Status: DISCONTINUED | OUTPATIENT
Start: 2025-02-14 | End: 2025-02-14 | Stop reason: HOSPADM

## 2025-02-14 RX ORDER — ALPRAZOLAM 0.25 MG/1
0.25 TABLET ORAL NIGHTLY
Status: DISCONTINUED | OUTPATIENT
Start: 2025-02-14 | End: 2025-02-19 | Stop reason: HOSPADM

## 2025-02-14 RX ORDER — VERAPAMIL HYDROCHLORIDE 2.5 MG/ML
INJECTION, SOLUTION INTRAVENOUS AS NEEDED
Status: DISCONTINUED | OUTPATIENT
Start: 2025-02-14 | End: 2025-02-14 | Stop reason: HOSPADM

## 2025-02-14 RX ORDER — ALPRAZOLAM 0.25 MG/1
0.25 TABLET ORAL 2 TIMES DAILY PRN
Status: DISCONTINUED | OUTPATIENT
Start: 2025-02-14 | End: 2025-02-19 | Stop reason: HOSPADM

## 2025-02-14 RX ORDER — LIDOCAINE HYDROCHLORIDE 10 MG/ML
INJECTION, SOLUTION EPIDURAL; INFILTRATION; INTRACAUDAL; PERINEURAL AS NEEDED
Status: DISCONTINUED | OUTPATIENT
Start: 2025-02-14 | End: 2025-02-14 | Stop reason: HOSPADM

## 2025-02-14 RX ORDER — MIDAZOLAM HYDROCHLORIDE 1 MG/ML
1 INJECTION, SOLUTION INTRAMUSCULAR; INTRAVENOUS ONCE
Status: COMPLETED | OUTPATIENT
Start: 2025-02-14 | End: 2025-02-14

## 2025-02-14 RX ORDER — HEPARIN SODIUM 1000 [USP'U]/ML
INJECTION, SOLUTION INTRAVENOUS; SUBCUTANEOUS AS NEEDED
Status: DISCONTINUED | OUTPATIENT
Start: 2025-02-14 | End: 2025-02-14 | Stop reason: HOSPADM

## 2025-02-14 RX ORDER — MIDAZOLAM HYDROCHLORIDE 1 MG/ML
INJECTION, SOLUTION INTRAMUSCULAR; INTRAVENOUS AS NEEDED
Status: DISCONTINUED | OUTPATIENT
Start: 2025-02-14 | End: 2025-02-14 | Stop reason: HOSPADM

## 2025-02-14 RX ADMIN — ASPIRIN 81 MG: 81 TABLET, COATED ORAL at 09:00

## 2025-02-14 RX ADMIN — PIPERACILLIN SODIUM AND TAZOBACTAM SODIUM 3.38 G: 3; .375 INJECTION, SOLUTION INTRAVENOUS at 23:43

## 2025-02-14 RX ADMIN — MIDAZOLAM 1 MG: 1 INJECTION INTRAMUSCULAR; INTRAVENOUS at 10:40

## 2025-02-14 RX ADMIN — PIPERACILLIN SODIUM AND TAZOBACTAM SODIUM 3.38 G: 3; .375 INJECTION, SOLUTION INTRAVENOUS at 10:41

## 2025-02-14 RX ADMIN — PIPERACILLIN SODIUM AND TAZOBACTAM SODIUM 3.38 G: 3; .375 INJECTION, SOLUTION INTRAVENOUS at 17:42

## 2025-02-14 RX ADMIN — ALPRAZOLAM 0.25 MG: 0.25 TABLET ORAL at 00:45

## 2025-02-14 RX ADMIN — HEPARIN SODIUM 5000 UNITS: 5000 INJECTION INTRAVENOUS; SUBCUTANEOUS at 05:37

## 2025-02-14 RX ADMIN — ALPRAZOLAM 0.25 MG: 0.25 TABLET ORAL at 20:17

## 2025-02-14 RX ADMIN — PIPERACILLIN SODIUM AND TAZOBACTAM SODIUM 3.38 G: 3; .375 INJECTION, SOLUTION INTRAVENOUS at 05:37

## 2025-02-14 RX ADMIN — LIDOCAINE 4% 1 PATCH: 40 PATCH TOPICAL at 09:00

## 2025-02-14 RX ADMIN — ALPRAZOLAM 0.25 MG: 0.25 TABLET ORAL at 23:43

## 2025-02-14 RX ADMIN — Medication 2 L/MIN: at 23:45

## 2025-02-14 RX ADMIN — LEVOTHYROXINE SODIUM 137 MCG: 0.14 TABLET ORAL at 09:00

## 2025-02-14 RX ADMIN — HEPARIN SODIUM 5000 UNITS: 5000 INJECTION INTRAVENOUS; SUBCUTANEOUS at 14:46

## 2025-02-14 ASSESSMENT — PAIN SCALES - GENERAL
PAINLEVEL_OUTOF10: 5 - MODERATE PAIN
PAINLEVEL_OUTOF10: 0 - NO PAIN

## 2025-02-14 ASSESSMENT — PAIN - FUNCTIONAL ASSESSMENT
PAIN_FUNCTIONAL_ASSESSMENT: 0-10

## 2025-02-14 ASSESSMENT — PAIN DESCRIPTION - DESCRIPTORS: DESCRIPTORS: ACHING;DISCOMFORT

## 2025-02-14 ASSESSMENT — ACTIVITIES OF DAILY LIVING (ADL): EFFECT OF PAIN ON DAILY ACTIVITIES: COMFORT/MOBILITY

## 2025-02-14 NOTE — POST-PROCEDURE NOTE
Physician Transition of Care Summary  Invasive Cardiovascular Lab    Procedure Date: 2/14/2025  Attending:    * Guru Khan - Primary  Resident/Fellow/Other Assistant: Surgeons and Role:  * No surgeons found with a matching role *    Indications:   Pre-op Diagnosis      * Cardiac arrest [I46.9]     * ICD (implantable cardioverter-defibrillator) in place [Z95.810]     * Polysubstance overdose, undetermined intent, subsequent encounter [T50.904D]     * Pulmonary hypertension (Multi) [I27.20]     * Severe tricuspid regurgitation by prior echocardiogram [I07.1]     * S/P ICD (internal cardiac defibrillator) procedure [Z95.810]    Post-procedure diagnosis:   Post-op Diagnosis     * Cardiac arrest [I46.9]     * ICD (implantable cardioverter-defibrillator) in place [Z95.810]     * Polysubstance overdose, undetermined intent, subsequent encounter [T50.904D]     * Pulmonary hypertension (Multi) [I27.20]     * Severe tricuspid regurgitation by prior echocardiogram [I07.1]     * S/P ICD (internal cardiac defibrillator) procedure [Z95.810]    Procedure(s):   Left & Right Heart Cath w Angiography & LV  57068 - MS R & L HRT CATH WINJX HRT ART& L VENTR IMG        Procedure Findings:   No significant CAD with minimal luminal irregularities in a right dominant system.  Elevated left and right heart filling pressures.  Mild pulmonary htn  Preserved cardiac index and normal SVR  No evidence of intracardiac shunt.  No evidence of aortic stenosis by gradient.      Description of the Procedure:   R brachial vein  R radial artery    Complications:   none    Stents/Implants:   Implants       No implant documentation for this case.            Anticoagulation/Antiplatelet Plan:   Per primary team    Estimated Blood Loss:   5 mL    Anesthesia: Moderate Sedation Anesthesia Staff: No anesthesia staff entered.    Any Specimen(s) Removed:   No specimens collected during this procedure.    Disposition:   ICU      Electronically signed by:  Guru Khan MD, 2/14/2025 4:54 PM

## 2025-02-14 NOTE — INTERVAL H&P NOTE
H&P reviewed. The patient was examined and there are no changes to the H&P.    Here for RHC/LHC in the setting of pHTN, RV dysfunction, cardiac arrest, VT.       ASA Classification: III  Mallampati Score: Class III    Sedation plan and risks discussed with:  patient    Further mgmt per inpatient primary and consulting teams, Cardiology following.     Brenda ZABALA-CNP  Interventional Lab/Cardiology   [facility]

## 2025-02-14 NOTE — CARE PLAN
The patient's goals for the shift include have adequate pain control and anxiety relief.     The clinical goals for the shift include pt to remain HDS.

## 2025-02-14 NOTE — PROGRESS NOTES
Critical Care Medicine Progress Note     Impression/Plan:Rashmi Oreilly is a 70 y/o f pmhx of HCV cirrhosis, OA s/p Left SANDI, ICD placement for VT, depression, anxiety on Alprazolam at home, who is admitted to the ICU with acute respiratory failure with hypoxemia d/t aspiration (most likely) in the setting of airway compromise as a consequence of toxic encephalopathy d/t cocaine, fentanyl with likely less contribution from benzodiazepines as patient takes Alprazolam at home.      Timeline:   2/10 - cardiopulmonary arrest, manifesting as PEA. During ACLS, patient was noted to have VT x 2 prompting shock. Amiodarone bolus given x 1, along with epinephrine per ACLS guidelines. Family notified. Vasopressors have since been weaned. Of note, on further investigation into patient's chart, patient did not follow-up with her cardiologist as advised for AICD up-grade.      On-going issues include the following:   Encephalopathy - multifactorial d/t polysuybstance overdose (cocaine and fentanyl most likely - POA)) coupled with anoxic ischemic component d/t PEA arrest with subsequent VT during ACLS resuscitation (~10 min). AICD is non-functioning on interrogation. Resolved   RV dysfunction on Echo post cardiopulmonary arrest: Venous doppler for DVT negative. VQ Scan with heterogeneous perfusion with segmental defect in LLL, w/intermediate Probability for PE. Prior hx of AICD lead Fx in past? RV dysfunction may be explained by PEA arrest alone in the setting of a non-functioning AICD.   Acute respiratory failure d/t air-way compromise (POA) - resolving. Liberated from vent support at 1200 on 2/12  Aspiration pneumonitis d/t polysubstance overdose - clinically improving.  On Pip-tazo  KT stage2- rising Cr and decreasing urine out-put, likely related to periodic decreases in MAP on 2/11 while on propofol for sedation. MAP has been stable            Neuro  - A-F bundle   - Sleep Hygiene measures: appropriate daytime  "stimulation/physical activity. Lights out at 2100, avoidance of night time lab draws/night baths, avoidance of delirium provoking medications  - resume home dose Alprazolam 0.25 mg po daily starting on 2/13        CV  - LHC and RHC today to eval for ischemia as underlying cause of VT  - goal MAP 65 mm Hg  - hold off on AC for now as patient will need AICD lead extraction with timing depending on L/RHC results            Pulm/ID:   - Monitor very closely   - empiric Pip-tazo and follow sputum Cx's            Renal/electrolyte/acid-base:   - avoid nephrotoxins as much as possible   - replace electrolytes as indicated   - maintain armendariz for accurate I/O         GI/Nutrition   - reg diet            Heme   - DVT ppx: SCD's, TANK     Endocrine  - goal serum glucose 140-180 mg/dL      Musculoskeletal/skin  - skin protective measures   - PT/OT when able      Family contact:  Johnson ()               Critical Care time: 80 min                Objective   Vitals:  Most Recent:  Vitals:    02/14/25 1100   BP:    Pulse: 73   Resp: 25   Temp:    SpO2: 95%       24hr Min/Max:  Temp  Min: 36.7 °C (98 °F)  Max: 36.8 °C (98.2 °F)  Pulse  Min: 70  Max: 94  BP  Min: 114/79  Max: 140/90  Resp  Min: 15  Max: 25  SpO2  Min: 90 %  Max: 97 %    LDA:  Urethral Catheter 16 Fr. (Active)   Placement Date/Time: 02/10/25 1547   Placed by: sabrina  Tube Size (Fr.): 16 Fr.  Catheter Balloon Size: 10 mL   Number of days: 3             Hemodynamic parameters for last 24 hours:       I/O:  I/O last 2 completed shifts:  In: 250 (2.6 mL/kg) [IV Piggyback:250]  Out: 1085 (11.2 mL/kg) [Urine:1085 (0.5 mL/kg/hr)]  Weight: 96.5 kg     Physical Exam:   /84   Pulse 73   Temp 36.7 °C (98 °F) (Temporal)   Resp 25   Ht 1.651 m (5' 5\")   Wt 96.5 kg (212 lb 11.2 oz)   SpO2 95%   BMI 35.40 kg/m²     Neuro: GCS E4, V5, M6, no focal deficits   CV: tachycardic, S1s2  Pulm: CTAB  Chest: neg accessory muscle recruitment   Abd: soft, non-tender   Ext: well " perfused, cap refill < 2 sec  Skin: warm and dry, no mottling or rash             Assessment/Plan   Assessment & Plan  Intentional drug overdose, initial encounter (Multi)    Airway compromise    Polysubstance overdose    Toxic encephalopathy    Acute respiratory failure    ICD (implantable cardioverter-defibrillator) in place    Cardiac arrest    Pulmonary hypertension (Multi)    Severe tricuspid regurgitation by prior echocardiogram    S/P ICD (internal cardiac defibrillator) procedure

## 2025-02-14 NOTE — PROGRESS NOTES
02/14/25 1519   Discharge Planning   Expected Discharge Disposition Home H         Patient remains in ICU. Patient refused SNF placement ,but agreed to [facility]. [facility] referrals sent and waiting on acceptance. Patient went to cath lab. SW is following for substance abuse. Will continue to follow for discharge needs.    1600     [facility] and they were made aware they are [facility].

## 2025-02-14 NOTE — PROGRESS NOTES
Subjective Data:  No chest pain, no chest pressure, no shortness of breath  No arrhythmias at telemetry  Diuresis (+)  Creatinine improving    Overnight Events:    None     Objective Data:  Last Recorded Vitals:  Vitals:    02/14/25 0800 02/14/25 0814 02/14/25 0900 02/14/25 1000   BP:  (!) 129/91 140/90 136/84   BP Location:       Patient Position:       Pulse: 78 79 86 84   Resp: 23 19 20 16   Temp: 36.7 °C (98 °F)      TempSrc: Temporal      SpO2: 94% 93% 93% 93%   Weight:       Height:           Last Labs:  CBC - 2/14/2025:  6:03 AM  8.6 10.1 57    31.9      CMP - 2/14/2025:  6:03 AM  8.7 5.9 34 --- 1.9   _ 2.8 27 94      PTT - No results in last year.  1.4   15.9 _     TROPHS   Date/Time Value Ref Range Status   02/10/2025 05:22 PM 4 0 - 13 ng/L Final   02/10/2025 03:24 PM 4 0 - 13 ng/L Final     BNP   Date/Time Value Ref Range Status   02/10/2025 03:21  0 - 99 pg/mL Final     HGBA1C   Date/Time Value Ref Range Status   01/02/2025 02:49 PM 4.6 4.3 - 5.6 % Final     Comment:     American Diabetes Association guidelines indicate that patients with HgbA1c in the range 5.7-6.4% are at increased risk for development of diabetes, and intervention by lifestyle modification may be beneficial. HgbA1c greater or equal to 6.5% is considered diagnostic of diabetes.   06/05/2024 12:21 PM 5.1 4.3 - 5.6 % Final     Comment:     American Diabetes Association guidelines indicate that patients with HgbA1c in the range 5.7-6.4% are at increased risk for development of diabetes, and intervention by lifestyle modification may be beneficial. HgbA1c greater or equal to 6.5% is considered diagnostic of diabetes.      Last I/O:  I/O last 3 completed shifts:  In: 661.3 (6.9 mL/kg) [I.V.:411.3 (4.3 mL/kg); IV Piggyback:250]  Out: 1785 (18.5 mL/kg) [Urine:1785 (0.5 mL/kg/hr)]  Weight: 96.5 kg     Past Cardiology Tests (Last 3 Years):  EKG:  ECG 12 lead 02/10/2025      ECG 12 lead 02/10/2025    Echo:  Transthoracic Echo (TTE) Complete  02/11/2025    Ejection Fractions:  EF   Date/Time Value Ref Range Status   02/11/2025 03:37 PM 63 %      Cath:  No results found for this or any previous visit from the past 1095 days.    Stress Test:  No results found for this or any previous visit from the past 1095 days.    Cardiac Imaging:  XR chest abdomen for OG NG placement 02/10/2025      XR chest abdomen for OG NG placement 02/10/2025      Inpatient Medications:  Scheduled medications   Medication Dose Route Frequency    ALPRAZolam  0.25 mg oral Nightly    aspirin  81 mg oral Daily    [Held by provider] gabapentin  400 mg oral TID    heparin (porcine)  5,000 Units subcutaneous q8h TANK    [Held by provider] lisinopril  20 mg oral Daily    And    [Held by provider] hydroCHLOROthiazide  12.5 mg oral Daily    levothyroxine  137 mcg oral Daily    lidocaine  1 patch transdermal Daily    ondansetron  4 mg intravenous Once    oxygen   inhalation Continuous - Inhalation    piperacillin-tazobactam  3.375 g intravenous q6h    polyethylene glycol  17 g oral Daily    psyllium  1 packet oral Daily     PRN medications   Medication    acetaminophen    Or    acetaminophen    ALPRAZolam     Continuous Medications   Medication Dose Last Rate       Physical Exam:  GENERAL:  alert and oriented x3   SKIN: warm, dry  CARDIAC: Regular rate and rhythm + murmurs  PULMONARY: Normal respiratory efforts, lungs clear to auscultation bilaterally.  ABDOMEN: soft, nondistended  EXTREMITIES: no lower extremity edema  NEURO: Alert and oriented         Assessment/Plan  Rashmi Oreilly is a 69 y.o. female with past medical history significant for Cardiac arrest secondary to ventricular fibrillation  in setting of electrolyte abnormalities  s/p  [facility] placement 4/1/2010,  Hypertension, Hyperlipidemia,  Chronic hepatitis C with cirrhosis, Hypothyroidism s/p thyroidectomy for papillary thyroid cancer, Anxiety/depression, Panic disorder, Lyme disease 1995, Osteoarthritis s/p total knee  arthroplasty, Drug and alcohol use. Presented with mental status change/overdose. Cardiology is consulted for AICD interrogation s/p brief cardiac arrest in ICU. Hx of cardiac arrest in 2010.      Home CV meds: aspirin 81 mg daily, Lisinopril-hydrochlorothiazide 20-12.5 mg daily      #Cardiac arrest   While in the ICU, she experienced cardiac arrest with documented bradycardia, pulselessness, and VT, requiring CPR, shock x 2,  epinephrine, bicarbonate and amiodarone, with subsequent ROSC   -Per documentation, her AICD was NABEEL in 2018. Also with evidence of ICD lead fracture.  AICD was initially placed 2010 after VF  arrest in setting of electrolyte abnormalities.  No documentation of generator change out.   -Device was unable to be interrogated 2/12/2024 as it was EOL  -Echocardiogram 2/11/2025 showed LVEF 60-65% no wall motion abnormalities right ventricle was severely enlarged with normal RV function moderate aortic stenosis and severe tricuspid regurgitation no pericardial effusion.   -The patient is alert and oriented. We talked about the indications for cause evaluation for her current episode. She will think also about if she is willing to ICD change in case there is an indication for it.      #RV dysfunction/Severe tricuspid valve regurgitation   Unclear etiology   Could be related to RV lead placement leading to severe tricuspid regurgitation.   VQ scan with intermediate probability for pulmonary embolism.  Unable to obtain CT angio  due to kidney dysfunction.  Unable to anticoagulate (platelets 51)     #Moderate Aortic stenosis   Outpatient surveillance      #Drug overdose      Recommendations   -Will will try to add her for right and left heart cath.  -Discussed with EP Dr. Romo. Due to documented evidence of prior AICD lead fracture, she will need device extraction which can only be done at [facility]. Will discuss further with EP after cath findings.    -Keep K >4 Mag >2. Continue to monitor on telemetry.      Code Status:  Full Code    Sd Young MD

## 2025-02-15 LAB
ALBUMIN SERPL BCP-MCNC: 2.8 G/DL (ref 3.4–5)
ALP SERPL-CCNC: 88 U/L (ref 33–136)
ALT SERPL W P-5'-P-CCNC: 26 U/L (ref 7–45)
ANION GAP SERPL CALC-SCNC: 13 MMOL/L (ref 10–20)
AST SERPL W P-5'-P-CCNC: 35 U/L (ref 9–39)
BILIRUB SERPL-MCNC: 1.8 MG/DL (ref 0–1.2)
BUN SERPL-MCNC: 23 MG/DL (ref 6–23)
CALCIUM SERPL-MCNC: 8.5 MG/DL (ref 8.6–10.3)
CHLORIDE SERPL-SCNC: 113 MMOL/L (ref 98–107)
CO2 SERPL-SCNC: 24 MMOL/L (ref 21–32)
CREAT SERPL-MCNC: 1.32 MG/DL (ref 0.5–1.05)
EGFRCR SERPLBLD CKD-EPI 2021: 44 ML/MIN/1.73M*2
ERYTHROCYTE [DISTWIDTH] IN BLOOD BY AUTOMATED COUNT: 14.6 % (ref 11.5–14.5)
GLUCOSE BLD MANUAL STRIP-MCNC: 93 MG/DL (ref 74–99)
GLUCOSE SERPL-MCNC: 93 MG/DL (ref 74–99)
HCT VFR BLD AUTO: 29.8 % (ref 36–46)
HGB BLD-MCNC: 9.6 G/DL (ref 12–16)
MAGNESIUM SERPL-MCNC: 1.75 MG/DL (ref 1.6–2.4)
MCH RBC QN AUTO: 33.7 PG (ref 26–34)
MCHC RBC AUTO-ENTMCNC: 32.2 G/DL (ref 32–36)
MCV RBC AUTO: 105 FL (ref 80–100)
NRBC BLD-RTO: 0.2 /100 WBCS (ref 0–0)
PLATELET # BLD AUTO: 65 X10*3/UL (ref 150–450)
POTASSIUM SERPL-SCNC: 3.5 MMOL/L (ref 3.5–5.3)
PROT SERPL-MCNC: 5.9 G/DL (ref 6.4–8.2)
RBC # BLD AUTO: 2.85 X10*6/UL (ref 4–5.2)
SODIUM SERPL-SCNC: 146 MMOL/L (ref 136–145)
WBC # BLD AUTO: 8.3 X10*3/UL (ref 4.4–11.3)

## 2025-02-15 PROCEDURE — 85027 COMPLETE CBC AUTOMATED: CPT

## 2025-02-15 PROCEDURE — 2500000002 HC RX 250 W HCPCS SELF ADMINISTERED DRUGS (ALT 637 FOR MEDICARE OP, ALT 636 FOR OP/ED): Performed by: NURSE PRACTITIONER

## 2025-02-15 PROCEDURE — 36415 COLL VENOUS BLD VENIPUNCTURE: CPT | Performed by: NURSE PRACTITIONER

## 2025-02-15 PROCEDURE — 2500000004 HC RX 250 GENERAL PHARMACY W/ HCPCS (ALT 636 FOR OP/ED): Performed by: NURSE PRACTITIONER

## 2025-02-15 PROCEDURE — 83735 ASSAY OF MAGNESIUM: CPT

## 2025-02-15 PROCEDURE — 2500000001 HC RX 250 WO HCPCS SELF ADMINISTERED DRUGS (ALT 637 FOR MEDICARE OP)

## 2025-02-15 PROCEDURE — 36415 COLL VENOUS BLD VENIPUNCTURE: CPT

## 2025-02-15 PROCEDURE — 99232 SBSQ HOSP IP/OBS MODERATE 35: CPT

## 2025-02-15 PROCEDURE — 80053 COMPREHEN METABOLIC PANEL: CPT | Performed by: NURSE PRACTITIONER

## 2025-02-15 PROCEDURE — 2500000002 HC RX 250 W HCPCS SELF ADMINISTERED DRUGS (ALT 637 FOR MEDICARE OP, ALT 636 FOR OP/ED)

## 2025-02-15 PROCEDURE — 1200000002 HC GENERAL ROOM WITH TELEMETRY DAILY

## 2025-02-15 PROCEDURE — 2500000005 HC RX 250 GENERAL PHARMACY W/O HCPCS

## 2025-02-15 PROCEDURE — 2500000001 HC RX 250 WO HCPCS SELF ADMINISTERED DRUGS (ALT 637 FOR MEDICARE OP): Performed by: NURSE PRACTITIONER

## 2025-02-15 PROCEDURE — 2500000004 HC RX 250 GENERAL PHARMACY W/ HCPCS (ALT 636 FOR OP/ED)

## 2025-02-15 PROCEDURE — 82947 ASSAY GLUCOSE BLOOD QUANT: CPT

## 2025-02-15 PROCEDURE — 99291 CRITICAL CARE FIRST HOUR: CPT

## 2025-02-15 RX ORDER — POTASSIUM CHLORIDE 20 MEQ/1
20 TABLET, EXTENDED RELEASE ORAL ONCE
Status: COMPLETED | OUTPATIENT
Start: 2025-02-15 | End: 2025-02-15

## 2025-02-15 RX ORDER — HYDROCHLOROTHIAZIDE 12.5 MG/1
12.5 TABLET ORAL DAILY
Status: DISCONTINUED | OUTPATIENT
Start: 2025-02-16 | End: 2025-02-19 | Stop reason: HOSPADM

## 2025-02-15 RX ORDER — LISINOPRIL 20 MG/1
20 TABLET ORAL DAILY
Status: DISCONTINUED | OUTPATIENT
Start: 2025-02-15 | End: 2025-02-19 | Stop reason: HOSPADM

## 2025-02-15 RX ORDER — IBUPROFEN 200 MG
1 TABLET ORAL DAILY
Status: DISCONTINUED | OUTPATIENT
Start: 2025-02-16 | End: 2025-02-19 | Stop reason: HOSPADM

## 2025-02-15 RX ORDER — FOLIC ACID 1 MG/1
0.5 TABLET ORAL DAILY
Status: DISCONTINUED | OUTPATIENT
Start: 2025-02-15 | End: 2025-02-19 | Stop reason: HOSPADM

## 2025-02-15 RX ORDER — LANOLIN ALCOHOL/MO/W.PET/CERES
100 CREAM (GRAM) TOPICAL DAILY
Status: DISCONTINUED | OUTPATIENT
Start: 2025-02-15 | End: 2025-02-19 | Stop reason: HOSPADM

## 2025-02-15 RX ORDER — MULTIVIT-MIN/IRON FUM/FOLIC AC 7.5 MG-4
1 TABLET ORAL DAILY
Status: DISCONTINUED | OUTPATIENT
Start: 2025-02-15 | End: 2025-02-19 | Stop reason: HOSPADM

## 2025-02-15 RX ADMIN — ALPRAZOLAM 0.25 MG: 0.25 TABLET ORAL at 21:05

## 2025-02-15 RX ADMIN — PIPERACILLIN SODIUM AND TAZOBACTAM SODIUM 3.38 G: 3; .375 INJECTION, SOLUTION INTRAVENOUS at 04:36

## 2025-02-15 RX ADMIN — PIPERACILLIN SODIUM AND TAZOBACTAM SODIUM 3.38 G: 3; .375 INJECTION, SOLUTION INTRAVENOUS at 17:19

## 2025-02-15 RX ADMIN — LEVOTHYROXINE SODIUM 137 MCG: 0.14 TABLET ORAL at 08:41

## 2025-02-15 RX ADMIN — POTASSIUM CHLORIDE 20 MEQ: 1500 TABLET, EXTENDED RELEASE ORAL at 08:41

## 2025-02-15 RX ADMIN — ASPIRIN 81 MG: 81 TABLET, COATED ORAL at 08:39

## 2025-02-15 RX ADMIN — PIPERACILLIN SODIUM AND TAZOBACTAM SODIUM 3.38 G: 3; .375 INJECTION, SOLUTION INTRAVENOUS at 11:14

## 2025-02-15 RX ADMIN — Medication 100 MG: at 08:41

## 2025-02-15 RX ADMIN — PIPERACILLIN SODIUM AND TAZOBACTAM SODIUM 3.38 G: 3; .375 INJECTION, SOLUTION INTRAVENOUS at 23:03

## 2025-02-15 RX ADMIN — Medication 1 TABLET: at 08:41

## 2025-02-15 RX ADMIN — ALPRAZOLAM 0.25 MG: 0.25 TABLET ORAL at 21:06

## 2025-02-15 RX ADMIN — HEPARIN SODIUM 5000 UNITS: 5000 INJECTION INTRAVENOUS; SUBCUTANEOUS at 14:12

## 2025-02-15 RX ADMIN — ACETAMINOPHEN 650 MG: 325 TABLET, FILM COATED ORAL at 13:21

## 2025-02-15 RX ADMIN — Medication 2 L/MIN: at 21:15

## 2025-02-15 RX ADMIN — LISINOPRIL 20 MG: 20 TABLET ORAL at 13:25

## 2025-02-15 RX ADMIN — FOLIC ACID 0.5 MG: 1 TABLET ORAL at 08:40

## 2025-02-15 ASSESSMENT — PAIN - FUNCTIONAL ASSESSMENT
PAIN_FUNCTIONAL_ASSESSMENT: 0-10

## 2025-02-15 ASSESSMENT — PAIN SCALES - GENERAL
PAINLEVEL_OUTOF10: 0 - NO PAIN
PAINLEVEL_OUTOF10: 8
PAINLEVEL_OUTOF10: 0 - NO PAIN
PAINLEVEL_OUTOF10: 3

## 2025-02-15 ASSESSMENT — PAIN DESCRIPTION - ORIENTATION: ORIENTATION: LEFT;MID

## 2025-02-15 ASSESSMENT — PAIN DESCRIPTION - LOCATION: LOCATION: CHEST

## 2025-02-15 NOTE — CARE PLAN
The patient's goals for the shift include have adequate anxiety and pain control throughout shift.     The clinical goals for the shift include patient will remain HDS and have improved mobility.

## 2025-02-15 NOTE — PROGRESS NOTES
Subjective Data:  No chest pain, no palpitations, no shortness of breath  At telemetry on sinus rhythm  Creatinine improving  Yesterday had a LHC and RHC that showed No significant CAD with minimal luminal irregularities in a right dominant system. Elevated left and right heart filling pressures. Mild pulmonary HTN. Preserved cardiac index and normal SVR. No evidence of intracardiac shunt. No evidence of aortic stenosis by gradient.    I talk to the patient and explain the findings of the catheterization, and we discuss again the ICD as secondary prevention. The patient informs me that she refuses the installation of a new ICD    Overnight Events:    None     Objective Data:  Last Recorded Vitals:  Vitals:    02/15/25 0900 02/15/25 1000 02/15/25 1100 02/15/25 1200   BP: (!) 100/25  (!) 131/92    BP Location:       Pulse: 80 82 79 81   Resp: (!) 40 (!) 27 (!) 27 12   Temp: 36.4 °C (97.5 °F) 36.6 °C (97.9 °F)     TempSrc:       SpO2: 93% 92% 93% 92%   Weight:       Height:           Last Labs:  CBC - 2/15/2025:  5:34 AM  8.3 9.6 65    29.8      CMP - 2/15/2025:  5:25 AM  8.5 5.9 35 --- 1.8   _ 2.8 26 88      PTT - No results in last year.  1.4   15.9 _     TROPHS   Date/Time Value Ref Range Status   02/10/2025 05:22 PM 4 0 - 13 ng/L Final   02/10/2025 03:24 PM 4 0 - 13 ng/L Final     BNP   Date/Time Value Ref Range Status   02/10/2025 03:21  0 - 99 pg/mL Final     HGBA1C   Date/Time Value Ref Range Status   01/02/2025 02:49 PM 4.6 4.3 - 5.6 % Final     Comment:     American Diabetes Association guidelines indicate that patients with HgbA1c in the range 5.7-6.4% are at increased risk for development of diabetes, and intervention by lifestyle modification may be beneficial. HgbA1c greater or equal to 6.5% is considered diagnostic of diabetes.   06/05/2024 12:21 PM 5.1 4.3 - 5.6 % Final     Comment:     American Diabetes Association guidelines indicate that patients with HgbA1c in the range 5.7-6.4% are at  increased risk for development of diabetes, and intervention by lifestyle modification may be beneficial. HgbA1c greater or equal to 6.5% is considered diagnostic of diabetes.      Last I/O:  I/O last 3 completed shifts:  In: 100 (1 mL/kg) [IV Piggyback:100]  Out: 1910 (19.8 mL/kg) [Urine:1905 (0.5 mL/kg/hr); Blood:5]  Weight: 96.4 kg     Past Cardiology Tests (Last 3 Years):  EKG:  ECG 12 lead 02/10/2025      ECG 12 lead 02/10/2025    Echo:  Transthoracic Echo (TTE) Complete 02/11/2025    Ejection Fractions:  EF   Date/Time Value Ref Range Status   02/11/2025 03:37 PM 63 %      Cath:  No results found for this or any previous visit from the past 1095 days.    Stress Test:  No results found for this or any previous visit from the past 1095 days.    Cardiac Imaging:  XR chest abdomen for OG NG placement 02/10/2025      XR chest abdomen for OG NG placement 02/10/2025      Inpatient Medications:  Scheduled medications   Medication Dose Route Frequency    ALPRAZolam  0.25 mg oral Nightly    aspirin  81 mg oral Daily    folic acid  0.5 mg oral Daily    [Held by provider] gabapentin  400 mg oral TID    heparin (porcine)  5,000 Units subcutaneous q8h TANK    lisinopril  20 mg oral Daily    And    [START ON 2/16/2025] hydroCHLOROthiazide  12.5 mg oral Daily    levothyroxine  137 mcg oral Daily    lidocaine  1 patch transdermal Daily    multivitamin with minerals  1 tablet oral Daily    ondansetron  4 mg intravenous Once    oxygen   inhalation Continuous - Inhalation    oxygen   inhalation Continuous - 02/gases    piperacillin-tazobactam  3.375 g intravenous q6h    polyethylene glycol  17 g oral Daily    psyllium  1 packet oral Daily    thiamine  100 mg oral Daily     PRN medications   Medication    acetaminophen    Or    acetaminophen    ALPRAZolam     Continuous Medications   Medication Dose Last Rate       Physical Exam:  GENERAL:  alert and oriented x3   SKIN: warm, dry  CARDIAC: Regular rate and rhythm +  murmurs  PULMONARY: Normal respiratory efforts, lungs clear to auscultation bilaterally.  ABDOMEN: soft, nondistended  EXTREMITIES: no lower extremity edema  NEURO: Alert and oriented         Assessment/Plan  Rashmi Oreilly is a 69 y.o. female with past medical history significant for Cardiac arrest secondary to ventricular fibrillation  in setting of electrolyte abnormalities  s/p  [facility] placement 4/1/2010,  Hypertension, Hyperlipidemia,  Chronic hepatitis C with cirrhosis, Hypothyroidism s/p thyroidectomy for papillary thyroid cancer, Anxiety/depression, Panic disorder, Lyme disease 1995, Osteoarthritis s/p total knee arthroplasty, Drug and alcohol use. Presented with mental status change/overdose. Cardiology is consulted for AICD interrogation s/p brief cardiac arrest in ICU. Hx of cardiac arrest in 2010.    Home CV meds: aspirin 81 mg daily, Lisinopril-hydrochlorothiazide 20-12.5 mg daily     02-14-25 LHC and RHC that showed No significant CAD with minimal luminal irregularities in a right dominant system. Elevated left and right heart filling pressures. Mild pulmonary HTN. Preserved cardiac index and normal SVR. No evidence of intracardiac shunt. No evidence of aortic stenosis by gradient.    #Cardiac arrest   -While in the ICU, she experienced cardiac arrest with documented bradycardia, pulselessness, and VT, requiring CPR, shock x 2,  epinephrine, bicarbonate and amiodarone, with subsequent ROSC   -Per documentation, her AICD was NABEEL in 2018. Also with evidence of ICD lead fracture.  AICD was initially placed 2010 after VF  arrest in setting of electrolyte abnormalities.  No documentation of generator change out.   -Device was unable to be interrogated 2/12/2024 as it was EOL  -Echocardiogram 2/11/2025 showed LVEF 60-65% no wall motion abnormalities right ventricle was severely enlarged with normal RV function moderate aortic stenosis and severe tricuspid regurgitation no pericardial effusion.   -I  talk to the patient and explain the findings of the catheterization, and we discuss again the ICD as secondary prevention. The patient informs me that she refuses the installation of a new ICD     #RV dysfunction/Severe tricuspid valve regurgitation   Unclear etiology. Could be related to RV lead placement leading to severe tricuspid regurgitation.   VQ scan with intermediate probability for pulmonary embolism.      #Thrombocytopenia  -Unclear cause     #Moderate Aortic stenosis   Outpatient surveillance      #Drug overdose      Assessment and Plan  Regarding the management of the arrhythmia, the most probable cause is related to drug overdose. No other cause has been identified during the study. Potentially there is still a pending study to rule out PE with a contrast CT, but the risk-benefit of anticoagulation needs to be evaluated if positive, given the presence of thrombocytopenia of uncertain origin. At this point, there is no clear indication to reinstall an ICD for secondary prevention (Because of the high likelihood of the VT was caused by the drug overdose), and the patient also refuses a new ICD, so there is no indication for a LifeVest at this time also. It is suggested to continue telemetry monitoring while the patient is hospitalized and to install a 14-day Holter monitor upon discharge. Outpatient follow-up with EP (Dr. Romo) to schedule lead extraction, which could be involved in the etiology of tricuspid insufficiency.  I suggest discontinuing calcium channel blockers and starting oral loop diuretics, but 24 hours after performing the CTPE, if it is to be done, provided there is no impact on creatinine  Keep K >4 Mag >2.   Cardiology will sing off, please call if questions    Code Status:  Full Code    Sd Young MD

## 2025-02-15 NOTE — CARE PLAN
The patient's goals for the shift include      The clinical goals for the shift include patient will remain HDS

## 2025-02-15 NOTE — PROGRESS NOTES
"Rashmi Oreilly is a 69 y.o. female on day 5 of admission presenting with Intentional drug overdose, initial encounter (Multi).    Subjective   Patient c/o chest discomfort with coughing.  Otherwise, she feels \"much improved\".       Objective     Physical Exam  Eyes:      Pupils: Pupils are equal, round, and reactive to light.   Cardiovascular:      Rate and Rhythm: Normal rate and regular rhythm.      Heart sounds: Normal heart sounds.   Pulmonary:      Comments: LLL diminished  Abdominal:      General: Bowel sounds are normal.      Palpations: Abdomen is soft.   Genitourinary:     Comments: Bazan draining rudi urine  Musculoskeletal:      Comments: Hemosiderin staining B/L LE  Trace edema LLE   Skin:     General: Skin is warm and dry.      Capillary Refill: Capillary refill takes less than 2 seconds.   Neurological:      Mental Status: She is alert and oriented to person, place, and time.   Psychiatric:         Mood and Affect: Mood normal.         Last Recorded Vitals  Blood pressure (!) 145/103, pulse 72, temperature 36.3 °C (97.3 °F), temperature source Temporal, resp. rate 15, height 1.651 m (5' 5\"), weight 96.4 kg (212 lb 8.4 oz), SpO2 94%.  Intake/Output last 3 Shifts:  I/O last 3 completed shifts:  In: 100 (1 mL/kg) [IV Piggyback:100]  Out: 1910 (19.8 mL/kg) [Urine:1905 (0.5 mL/kg/hr); Blood:5]  Weight: 96.4 kg     Relevant Results  Scheduled medications  ALPRAZolam, 0.25 mg, oral, Nightly  aspirin, 81 mg, oral, Daily  [Held by provider] gabapentin, 400 mg, oral, TID  heparin (porcine), 5,000 Units, subcutaneous, q8h TANK  [Held by provider] lisinopril, 20 mg, oral, Daily   And  [Held by provider] hydroCHLOROthiazide, 12.5 mg, oral, Daily  levothyroxine, 137 mcg, oral, Daily  lidocaine, 1 patch, transdermal, Daily  ondansetron, 4 mg, intravenous, Once  oxygen, , inhalation, Continuous - Inhalation  oxygen, , inhalation, Continuous - 02/gases  piperacillin-tazobactam, 3.375 g, intravenous, " q6h  polyethylene glycol, 17 g, oral, Daily  potassium chloride CR, 20 mEq, oral, Once  psyllium, 1 packet, oral, Daily      Continuous medications     PRN medications  PRN medications: acetaminophen **OR** acetaminophen, ALPRAZolam  Results for orders placed or performed during the hospital encounter of 02/10/25 (from the past 24 hours)   POCT GLUCOSE   Result Value Ref Range    POCT Glucose 105 (H) 74 - 99 mg/dL   Comprehensive Metabolic Panel   Result Value Ref Range    Glucose 93 74 - 99 mg/dL    Sodium 146 (H) 136 - 145 mmol/L    Potassium 3.5 3.5 - 5.3 mmol/L    Chloride 113 (H) 98 - 107 mmol/L    Bicarbonate 24 21 - 32 mmol/L    Anion Gap 13 10 - 20 mmol/L    Urea Nitrogen 23 6 - 23 mg/dL    Creatinine 1.32 (H) 0.50 - 1.05 mg/dL    eGFR 44 (L) >60 mL/min/1.73m*2    Calcium 8.5 (L) 8.6 - 10.3 mg/dL    Albumin 2.8 (L) 3.4 - 5.0 g/dL    Alkaline Phosphatase 88 33 - 136 U/L    Total Protein 5.9 (L) 6.4 - 8.2 g/dL    AST 35 9 - 39 U/L    Bilirubin, Total 1.8 (H) 0.0 - 1.2 mg/dL    ALT 26 7 - 45 U/L   CBC   Result Value Ref Range    WBC 8.3 4.4 - 11.3 x10*3/uL    nRBC 0.2 (H) 0.0 - 0.0 /100 WBCs    RBC 2.85 (L) 4.00 - 5.20 x10*6/uL    Hemoglobin 9.6 (L) 12.0 - 16.0 g/dL    Hematocrit 29.8 (L) 36.0 - 46.0 %     (H) 80 - 100 fL    MCH 33.7 26.0 - 34.0 pg    MCHC 32.2 32.0 - 36.0 g/dL    RDW 14.6 (H) 11.5 - 14.5 %    Platelets 65 (L) 150 - 450 x10*3/uL         This patient has a urinary catheter   Reason for the urinary catheter remaining today? Urine catheter unnecessary, will be removed today       Assessment/Plan   Assessment & Plan  Intentional drug overdose, initial encounter (Multi)    Airway compromise    Polysubstance overdose    Toxic encephalopathy    Acute respiratory failure    ICD (implantable cardioverter-defibrillator) in place    Cardiac arrest    Pulmonary hypertension (Multi)    Severe tricuspid regurgitation by prior echocardiogram    S/P ICD (internal cardiac defibrillator)  procedure    Rashmi Oreilly is a 68 y/o f pmhx of HCV cirrhosis, OA s/p Left SANDI, ICD placement for VT, depression, anxiety on Alprazolam at home, who is admitted to the ICU with acute respiratory failure with hypoxemia d/t aspiration (most likely) in the setting of airway compromise as a consequence of toxic encephalopathy d/t cocaine, fentanyl with likely less contribution from benzodiazepines as patient takes Alprazolam at home.     NEURO:   Encephalopathy- multi-factorial d/t polysubstance overdose coupled with anoxic ischemic componsent d/t PEA arrest with subsequent VT during ACLS resuscitation (~ 10 min)- resolved  - Serial neuro and pain assessments  - A-F bundle  - Continue home Alprazolam  - Hold Neurontin  - Analgesia: Tylenol PRN    CV:   Cardiac arrest (initially asystole followed by VT), non-functioning AICD d/t lead fracture  Severe TR   LHC/RHC 2/14 to evaluate for ischemia as underlying cause of VT- pending results may need replacement of AICD  - Keep MAP > 65  - Continue home ASA  - Re-start Lisinopril/hydrochlorothiazide  - Cont EKG, NIBP  - Cardiology consulted    PULM:   RV dysfunction on echo post arrest- d/t cardiac arrest vs. PE  VQ scan with heterogeneous perfusion with segmental defect in LLL intermediate probability for PE- will hold off on CT PE as RV dysfunction may be explained by PEA arrest and AICD lead fracture.  Patient currently on 2L O2 without tachycardia or tachypnea.  Acute hypoxemic respiratory failure d/t airway compromise- liberated from ventilator on 2/12  - Wean FiO2 to SpO2 > 92%    GI:   - Cardiac Diet  - Bowel regimen with Miralax and Metamucil    /Renal:   KT stage II likely r/t periodic decreases in MAP while on sedation- improving  - Trend RFP, replete lytes per protocol  - Strict I&Os  - Bazan removal today    HEME:   Macrocytic anemia likely r/t nutritional deficiency  Thrombocytopenia likely r/t aspiration pneumonitis i/s/o nutritional deficiency- appears  chronic  - Start MVI, Folate, Thiamine  - CBC daily  - DVTppx: SCDs, Hold AC awaiting if patient will need AICD replacement  - Consult Hematology    ENDO:   H/O hypothyroidism  Hgb A1c 4.6%  - Continue home Synthroid    ID:   Aspiration pneumonitis d/t polysubstance overdose  Leukocytosis resolved, afebrile  Sputum culture negative  - Continue Zosyn  - Trend temp, WBCs     ICU Checklist  Analgesia:  Tylenol PRN  PT/OT:  Following  Update Family:  , Johnson  Diet:  Cardiac   Antibiotics:  Zosyn IV  Skin issues:  N/A  Lines:  PIV x 2  DVT Prophylaxis:  SCDs  Code Status:  Full Code  Disposition:  Transfer to SDU today    I spent 60 minutes in the professional and overall care of this patient.      Vicki Jean, APRN-CNP

## 2025-02-15 NOTE — CARE PLAN
The patient's goals for the shift include      The clinical goals for the shift include patient will remain HDS      Problem: Safety - Medical Restraint  Goal: Remains free of injury from restraints (Restraint for Interference with Medical Device)  2/15/2025 0516 by Lanie Hanson RN  Outcome: Progressing  2/15/2025 0514 by Lanie Hanson RN  Outcome: Progressing  Goal: Free from restraint(s) (Restraint for Interference with Medical Device)  2/15/2025 0516 by Lanie Hanson RN  Outcome: Progressing  2/15/2025 0514 by Lanie Hanson RN  Outcome: Progressing     Problem: Skin  Goal: Promote skin healing  2/15/2025 0516 by Lanie Hanson RN  Outcome: Progressing  2/15/2025 0514 by Lanie Hanson RN  Outcome: Progressing     Problem: Fall/Injury  Goal: Not fall by end of shift  2/15/2025 0516 by Lanie Hanson RN  Outcome: Progressing  2/15/2025 0514 by Lanie Hanson RN  Outcome: Progressing  Goal: Be free from injury by end of the shift  2/15/2025 0516 by Lanie Hanson RN  Outcome: Progressing  2/15/2025 0514 by Lanie Hanson RN  Outcome: Progressing     Problem: Pain - Adult  Goal: Verbalizes/displays adequate comfort level or baseline comfort level  2/15/2025 0516 by Lanie Hanson RN  Outcome: Progressing  2/15/2025 0514 by Lanie Hanson RN  Outcome: Progressing     Problem: Safety - Adult  Goal: Free from fall injury  2/15/2025 0516 by Lanie Hanson RN  Outcome: Progressing  2/15/2025 0514 by Lanie Hanson RN  Outcome: Progressing     Problem: Discharge Planning  Goal: Discharge to home or other facility with appropriate resources  2/15/2025 0516 by Lanie Hanson RN  Outcome: Progressing  2/15/2025 0514 by Lanie Hanson RN  Outcome: Progressing     Problem: Chronic Conditions and Co-morbidities  Goal: Patient's chronic conditions and co-morbidity symptoms are monitored and maintained or improved  2/15/2025 0516 by Lanie Hanson RN  Outcome: Progressing  2/15/2025 0514 by Lanie Hanson RN  Outcome: Progressing     Problem: Nutrition  Goal:  Nutrient intake appropriate for maintaining nutritional needs  2/15/2025 0516 by Lanie Hanson, RN  Outcome: Progressing  2/15/2025 0514 by Lanie Hanson RN  Outcome: Progressing

## 2025-02-16 ENCOUNTER — APPOINTMENT (OUTPATIENT)
Dept: RADIOLOGY | Facility: HOSPITAL | Age: 70
DRG: 917 | End: 2025-02-16
Payer: MEDICARE

## 2025-02-16 LAB
ALBUMIN SERPL BCP-MCNC: 3.2 G/DL (ref 3.4–5)
ANION GAP SERPL CALC-SCNC: 11 MMOL/L (ref 10–20)
BODY SURFACE AREA: 2.1 M2
BUN SERPL-MCNC: 21 MG/DL (ref 6–23)
CALCIUM SERPL-MCNC: 9.1 MG/DL (ref 8.6–10.3)
CHLORIDE SERPL-SCNC: 111 MMOL/L (ref 98–107)
CO2 SERPL-SCNC: 26 MMOL/L (ref 21–32)
CREAT SERPL-MCNC: 1.3 MG/DL (ref 0.5–1.05)
EGFRCR SERPLBLD CKD-EPI 2021: 45 ML/MIN/1.73M*2
ERYTHROCYTE [DISTWIDTH] IN BLOOD BY AUTOMATED COUNT: 15.4 % (ref 11.5–14.5)
GLUCOSE SERPL-MCNC: 95 MG/DL (ref 74–99)
HCT VFR BLD AUTO: 30 % (ref 36–46)
HGB BLD-MCNC: 9.7 G/DL (ref 12–16)
HOLD SPECIMEN: NORMAL
MCH RBC QN AUTO: 33.4 PG (ref 26–34)
MCHC RBC AUTO-ENTMCNC: 32.3 G/DL (ref 32–36)
MCV RBC AUTO: 103 FL (ref 80–100)
NRBC BLD-RTO: 1.3 /100 WBCS (ref 0–0)
PHOSPHATE SERPL-MCNC: 2.3 MG/DL (ref 2.5–4.9)
PLATELET # BLD AUTO: 71 X10*3/UL (ref 150–450)
POTASSIUM SERPL-SCNC: 3 MMOL/L (ref 3.5–5.3)
RBC # BLD AUTO: 2.9 X10*6/UL (ref 4–5.2)
SODIUM SERPL-SCNC: 145 MMOL/L (ref 136–145)
WBC # BLD AUTO: 6.3 X10*3/UL (ref 4.4–11.3)

## 2025-02-16 PROCEDURE — 2500000001 HC RX 250 WO HCPCS SELF ADMINISTERED DRUGS (ALT 637 FOR MEDICARE OP)

## 2025-02-16 PROCEDURE — 99233 SBSQ HOSP IP/OBS HIGH 50: CPT | Performed by: INTERNAL MEDICINE

## 2025-02-16 PROCEDURE — 93971 EXTREMITY STUDY: CPT | Performed by: RADIOLOGY

## 2025-02-16 PROCEDURE — 2500000004 HC RX 250 GENERAL PHARMACY W/ HCPCS (ALT 636 FOR OP/ED)

## 2025-02-16 PROCEDURE — 93971 EXTREMITY STUDY: CPT

## 2025-02-16 PROCEDURE — 85027 COMPLETE CBC AUTOMATED: CPT

## 2025-02-16 PROCEDURE — 84100 ASSAY OF PHOSPHORUS: CPT

## 2025-02-16 PROCEDURE — 2500000002 HC RX 250 W HCPCS SELF ADMINISTERED DRUGS (ALT 637 FOR MEDICARE OP, ALT 636 FOR OP/ED)

## 2025-02-16 PROCEDURE — 1200000002 HC GENERAL ROOM WITH TELEMETRY DAILY

## 2025-02-16 PROCEDURE — 80069 RENAL FUNCTION PANEL: CPT

## 2025-02-16 PROCEDURE — S4991 NICOTINE PATCH NONLEGEND: HCPCS | Performed by: NURSE PRACTITIONER

## 2025-02-16 PROCEDURE — 2500000002 HC RX 250 W HCPCS SELF ADMINISTERED DRUGS (ALT 637 FOR MEDICARE OP, ALT 636 FOR OP/ED): Performed by: NURSE PRACTITIONER

## 2025-02-16 PROCEDURE — 36415 COLL VENOUS BLD VENIPUNCTURE: CPT

## 2025-02-16 RX ADMIN — FOLIC ACID 0.5 MG: 1 TABLET ORAL at 09:11

## 2025-02-16 RX ADMIN — LEVOTHYROXINE SODIUM 137 MCG: 0.14 TABLET ORAL at 09:10

## 2025-02-16 RX ADMIN — ASPIRIN 81 MG: 81 TABLET, COATED ORAL at 09:10

## 2025-02-16 RX ADMIN — ACETAMINOPHEN 650 MG: 325 TABLET, FILM COATED ORAL at 02:11

## 2025-02-16 RX ADMIN — ONDANSETRON 4 MG: 2 INJECTION, SOLUTION INTRAMUSCULAR; INTRAVENOUS at 05:33

## 2025-02-16 RX ADMIN — HEPARIN SODIUM 5000 UNITS: 5000 INJECTION INTRAVENOUS; SUBCUTANEOUS at 14:57

## 2025-02-16 RX ADMIN — PIPERACILLIN SODIUM AND TAZOBACTAM SODIUM 3.38 G: 3; .375 INJECTION, SOLUTION INTRAVENOUS at 11:26

## 2025-02-16 RX ADMIN — HYDROCHLOROTHIAZIDE 12.5 MG: 12.5 TABLET ORAL at 09:10

## 2025-02-16 RX ADMIN — PIPERACILLIN SODIUM AND TAZOBACTAM SODIUM 3.38 G: 3; .375 INJECTION, SOLUTION INTRAVENOUS at 22:01

## 2025-02-16 RX ADMIN — Medication 100 MG: at 09:11

## 2025-02-16 RX ADMIN — LISINOPRIL 20 MG: 20 TABLET ORAL at 09:10

## 2025-02-16 RX ADMIN — NICOTINE 1 PATCH: 14 PATCH, EXTENDED RELEASE TRANSDERMAL at 00:08

## 2025-02-16 RX ADMIN — PIPERACILLIN SODIUM AND TAZOBACTAM SODIUM 3.38 G: 3; .375 INJECTION, SOLUTION INTRAVENOUS at 17:14

## 2025-02-16 RX ADMIN — HEPARIN SODIUM 5000 UNITS: 5000 INJECTION INTRAVENOUS; SUBCUTANEOUS at 05:33

## 2025-02-16 RX ADMIN — ACETAMINOPHEN 650 MG: 325 TABLET, FILM COATED ORAL at 20:40

## 2025-02-16 RX ADMIN — PIPERACILLIN SODIUM AND TAZOBACTAM SODIUM 3.38 G: 3; .375 INJECTION, SOLUTION INTRAVENOUS at 04:42

## 2025-02-16 RX ADMIN — HEPARIN SODIUM 5000 UNITS: 5000 INJECTION INTRAVENOUS; SUBCUTANEOUS at 21:48

## 2025-02-16 RX ADMIN — ACETAMINOPHEN 650 MG: 325 TABLET, FILM COATED ORAL at 15:55

## 2025-02-16 RX ADMIN — ALPRAZOLAM 0.25 MG: 0.25 TABLET ORAL at 20:39

## 2025-02-16 ASSESSMENT — PAIN - FUNCTIONAL ASSESSMENT
PAIN_FUNCTIONAL_ASSESSMENT: 0-10

## 2025-02-16 ASSESSMENT — PAIN SCALES - GENERAL
PAINLEVEL_OUTOF10: 0 - NO PAIN
PAINLEVEL_OUTOF10: 4
PAINLEVEL_OUTOF10: 5 - MODERATE PAIN
PAINLEVEL_OUTOF10: 1
PAINLEVEL_OUTOF10: 5 - MODERATE PAIN
PAINLEVEL_OUTOF10: 1
PAINLEVEL_OUTOF10: 0 - NO PAIN
PAINLEVEL_OUTOF10: 4

## 2025-02-16 ASSESSMENT — PAIN DESCRIPTION - LOCATION: LOCATION: CHEST

## 2025-02-16 ASSESSMENT — PAIN DESCRIPTION - ORIENTATION: ORIENTATION: MID;LEFT

## 2025-02-16 NOTE — SIGNIFICANT EVENT
Attempted to see pt, has been in with Ultrasound, will try again to see patient tomorrow for consult.

## 2025-02-16 NOTE — CARE PLAN
The patient's goals for the shift include adequate pain management and have improvement mobility..    The clinical goals for the shift include patient will remain HDS.

## 2025-02-16 NOTE — PROGRESS NOTES
[facility] Call Center    Phone Message    May a detailed message be left on voicemail: yes     Reason for Call: Other: Pt called regarding below and wanted to know if she can get a fill until her next appt. Pt is out of this medication. The shot was due yesterday. Please call the pt back or send the refill to [employer]. Thanks      Action Taken: Message routed to:  Adult Clinics: Dermatology p [id number]    Travel Screening: Not Applicable                                                                    Rashmi Oreilly is a 69 y.o. female     Patient transferred from the ICU  Medical records reviewed    Patient has history of drug abuse and was admitted with drug overdose  The initial VQ scan was intermediate probability for pulmonary embolism  She remains tachycardic  Cardiac cath was unremarkable with minimal irregularities  Kidney functions are improving  I will repeat a CT PE study tomorrow if kidney functions continue to improve  Also has significant edema especially in left upper extremity with diminished pulses    Review of Systems     Constitutional: no fever, no chills, not feeling poorly, not feeling tired   Cardiovascular: no chest pain   Respiratory: no cough, wheezing or shortness of breath a  Gastrointestinal: no abdominal pain, no constipation, no melena, no nausea, no diarrhea, no vomiting and no blood in stools.   Neurological: no headache,   All other systems have been reviewed and are negative for complaint.       Vitals:    02/16/25 1300   BP:    Pulse: 74   Resp: 25   Temp:    SpO2:         Scheduled medications  ALPRAZolam, 0.25 mg, oral, Nightly  aspirin, 81 mg, oral, Daily  folic acid, 0.5 mg, oral, Daily  [Held by provider] gabapentin, 400 mg, oral, TID  heparin (porcine), 5,000 Units, subcutaneous, q8h TANK  lisinopril, 20 mg, oral, Daily   And  hydroCHLOROthiazide, 12.5 mg, oral, Daily  levothyroxine, 137 mcg, oral, Daily  lidocaine, 1 patch, transdermal, Daily  multivitamin with minerals, 1 tablet, oral, Daily  nicotine, 1 patch, transdermal, Daily  oxygen, , inhalation, Continuous - Inhalation  oxygen, , inhalation, Continuous - 02/gases  piperacillin-tazobactam, 3.375 g, intravenous, q6h  polyethylene glycol, 17 g, oral, Daily  psyllium, 1 packet, oral, Daily  thiamine, 100 mg, oral, Daily      Continuous medications     PRN medications  PRN medications: acetaminophen **OR** acetaminophen, ALPRAZolam    Lab Review   Results from last 7 days   Lab Units 02/15/25  0534 02/14/25  0603  02/13/25  0541   WBC AUTO x10*3/uL 8.3 8.6 9.8   HEMOGLOBIN g/dL 9.6* 10.1* 10.1*   HEMATOCRIT % 29.8* 31.9* 31.2*   PLATELETS AUTO x10*3/uL 65* 57* 51*     Results from last 7 days   Lab Units 02/15/25  0525 02/14/25  0603 02/13/25  1413 02/13/25  0541   SODIUM mmol/L 146* 145 144 143   POTASSIUM mmol/L 3.5 3.7 4.5 3.8   CHLORIDE mmol/L 113* 113* 112* 111*   CO2 mmol/L 24 27 25 25   BUN mg/dL 23 25* 30* 32*   CREATININE mg/dL 1.32* 1.41* 1.60* 1.64*   CALCIUM mg/dL 8.5* 8.7 8.7 8.7   PROTEIN TOTAL g/dL 5.9* 5.9*  --  6.1*   BILIRUBIN TOTAL mg/dL 1.8* 1.9*  --  1.9*   ALK PHOS U/L 88 94  --  94   ALT U/L 26 27  --  29   AST U/L 35 34  --  37   GLUCOSE mg/dL 93 94 144* 99     Results from last 7 days   Lab Units 02/10/25  1722 02/10/25  1524   TROPHS ng/L 4 4        NM Lung perfusion with spect/ct   Final Result   Heterogeneous perfusion within both lungs with a segmental perfusion   defect in the left lower lobe indicating intermediate probability for   pulmonary embolism.        The interpretation above is based on modified PIOPED II and PISAPED   criteria.        This study was analyzed and interpreted at [place], [place].        Signed by: Alberto Warren 2/12/2025 6:23 PM   Dictation workstation:   [id number]      Vascular US lower extremity venous duplex bilateral   Final Result   No sonographic evidence DVT in the bilateral lower extremities, given   limitations as above.        Fluid collection in the right popliteal fossa is incompletely   evaluated, but favored to represent a Baker's cyst.        MACRO:   None        Signed by: Denise Pineda 2/12/2025 6:03 PM   Dictation workstation:   [id number]      Cardiac Device Check - Inpatient         Transthoracic Echo (TTE) Complete   Final Result      XR chest abdomen for OG NG placement   Final Result   1.ET tube in place with the tip about 2.0 cm above the level of   the seven. Enteric tube in place with the tip in the right midabdomen    and could be in the gastric antrum or proximal duodenum.   2.Relatively gasless gastrointestinal pattern with no overt   evidence of bowel obstruction.   3.Bibasilar subsegmental atelectasis or scarring with possible   nodule in the right upper lobe versus artifact and possible nodule   versus scarring or atelectasis in the left base. Attention to these on   follow-up is suggested.   Signed by Amy Noe DO      CT head wo IV contrast   Final Result   No CT evidence for acute intracranial pathology.        Signed by: Blaine Perez 2/10/2025 5:49 PM   Dictation workstation:   [id number]      XR chest abdomen for OG NG placement   Final Result   1. Mild opacification of the left lung base may reflect atelectasis   or small infiltrate.   2. The enteric tube distal tip projects at the expected location of   the gastric body.   3. The endotracheal tube distal tip projects 5.3 cm above the seven.        MACRO:   None        Signed by: Rachael James 2/10/2025 4:28 PM   Dictation workstation:   [id number]      Cardiac Catheterization Procedure    (Results Pending)   Vascular US upper extremity venous duplex left    (Results Pending)         Physical Exam    Constitutional   General appearance: Alert and in no acute distress.   Pulmonary   Respiratory assessment: No respiratory distress, normal respiratory rhythm and effort.    Auscultation of Lungs: Faint crackles  Cardiovascular   Auscultation of heart: Apical pulse normal, heart rate and rhythm normal, normal S1 and S2, no murmurs and no pericardial rub.    Exam for edema: Plus edema  Abdomen   Abdominal Exam: No bruits, normal bowel sounds, soft, non-tender, no abdominal mass palpated.    Liver and Spleen exam: No hepato-splenomegaly.   Musculoskeletal      Inspection/palpation of joints, bones and muscles: No joint swelling. Normal movement of all extremities.   Neurologic   Cranial nerves: Nerves 2-12 were intact, no focal neuro defects.          Assessment/Plan      #Cardiac arrest  Likely brought on by drug overdose  Cardiac cath unremarkable  Still need to rule out pulmonary embolism  If kidney functions are better we will get a CT chest PE  Doppler ultrasounds of lower extremities were negative  Getting ultrasound of the upper left upper extremity    #Thrombocytopenia  Unclear reasons but could be cirrhosis related  Present since admission  Currently on subcu heparin's  We will get hematology consult    #Polysubstance abuse  Will need outpatient rehab    #Hypothyroidism s/p thyroidectomy for cancer  Continue Synthroid and maintain low TSH    #Chronic hepatitis C with cirrhosis  #Hypertension  #Dyslipidemia  Continue current medications

## 2025-02-17 VITALS
SYSTOLIC BLOOD PRESSURE: 138 MMHG | BODY MASS INDEX: 35.41 KG/M2 | HEART RATE: 81 BPM | RESPIRATION RATE: 31 BRPM | TEMPERATURE: 99 F | OXYGEN SATURATION: 94 % | HEIGHT: 65 IN | DIASTOLIC BLOOD PRESSURE: 97 MMHG | WEIGHT: 212.52 LBS

## 2025-02-17 LAB
ALBUMIN SERPL BCP-MCNC: 3.1 G/DL (ref 3.4–5)
ALBUMIN SERPL BCP-MCNC: 3.1 G/DL (ref 3.4–5)
ALP SERPL-CCNC: 86 U/L (ref 33–136)
ALT SERPL W P-5'-P-CCNC: 30 U/L (ref 7–45)
ANION GAP SERPL CALC-SCNC: 11 MMOL/L (ref 10–20)
APTT PPP: 32 SECONDS (ref 27–38)
AST SERPL W P-5'-P-CCNC: 48 U/L (ref 9–39)
BILIRUB DIRECT SERPL-MCNC: 0.4 MG/DL (ref 0–0.3)
BILIRUB SERPL-MCNC: 1.1 MG/DL (ref 0–1.2)
BUN SERPL-MCNC: 20 MG/DL (ref 6–23)
CALCIUM SERPL-MCNC: 8.7 MG/DL (ref 8.6–10.3)
CHLORIDE SERPL-SCNC: 113 MMOL/L (ref 98–107)
CO2 SERPL-SCNC: 26 MMOL/L (ref 21–32)
CREAT SERPL-MCNC: 1.33 MG/DL (ref 0.5–1.05)
D DIMER PPP FEU-MCNC: 2425 NG/ML FEU
EGFRCR SERPLBLD CKD-EPI 2021: 44 ML/MIN/1.73M*2
ERYTHROCYTE [DISTWIDTH] IN BLOOD BY AUTOMATED COUNT: 15.4 % (ref 11.5–14.5)
FERRITIN SERPL-MCNC: 472 NG/ML (ref 8–150)
FIBRINOGEN PPP-MCNC: 185 MG/DL (ref 200–400)
FOLATE SERPL-MCNC: 10.7 NG/ML
GLUCOSE SERPL-MCNC: 115 MG/DL (ref 74–99)
HCT VFR BLD AUTO: 31.6 % (ref 36–46)
HGB BLD-MCNC: 9.8 G/DL (ref 12–16)
INR PPP: 1.2 (ref 0.9–1.1)
IRON SATN MFR SERPL: 41 % (ref 25–45)
IRON SERPL-MCNC: 103 UG/DL (ref 35–150)
MCH RBC QN AUTO: 32.2 PG (ref 26–34)
MCHC RBC AUTO-ENTMCNC: 31 G/DL (ref 32–36)
MCV RBC AUTO: 104 FL (ref 80–100)
NRBC BLD-RTO: 1.6 /100 WBCS (ref 0–0)
PHOSPHATE SERPL-MCNC: 2.8 MG/DL (ref 2.5–4.9)
PLATELET # BLD AUTO: 65 X10*3/UL (ref 150–450)
POTASSIUM SERPL-SCNC: 3 MMOL/L (ref 3.5–5.3)
PROT SERPL-MCNC: 6.5 G/DL (ref 6.4–8.2)
PROTHROMBIN TIME: 13.4 SECONDS (ref 9.8–12.8)
RBC # BLD AUTO: 3.04 X10*6/UL (ref 4–5.2)
SODIUM SERPL-SCNC: 147 MMOL/L (ref 136–145)
TIBC SERPL-MCNC: 254 UG/DL (ref 240–445)
UIBC SERPL-MCNC: 151 UG/DL (ref 110–370)
VIT B12 SERPL-MCNC: 873 PG/ML (ref 211–911)
WBC # BLD AUTO: 6.1 X10*3/UL (ref 4.4–11.3)

## 2025-02-17 PROCEDURE — 2500000004 HC RX 250 GENERAL PHARMACY W/ HCPCS (ALT 636 FOR OP/ED): Performed by: INTERNAL MEDICINE

## 2025-02-17 PROCEDURE — 2500000005 HC RX 250 GENERAL PHARMACY W/O HCPCS

## 2025-02-17 PROCEDURE — 85384 FIBRINOGEN ACTIVITY: CPT | Performed by: NURSE PRACTITIONER

## 2025-02-17 PROCEDURE — 2500000001 HC RX 250 WO HCPCS SELF ADMINISTERED DRUGS (ALT 637 FOR MEDICARE OP): Performed by: INTERNAL MEDICINE

## 2025-02-17 PROCEDURE — 85379 FIBRIN DEGRADATION QUANT: CPT | Performed by: NURSE PRACTITIONER

## 2025-02-17 PROCEDURE — 82746 ASSAY OF FOLIC ACID SERUM: CPT | Mod: AHULAB | Performed by: NURSE PRACTITIONER

## 2025-02-17 PROCEDURE — 82728 ASSAY OF FERRITIN: CPT | Performed by: NURSE PRACTITIONER

## 2025-02-17 PROCEDURE — 82040 ASSAY OF SERUM ALBUMIN: CPT | Performed by: NURSE PRACTITIONER

## 2025-02-17 PROCEDURE — 83540 ASSAY OF IRON: CPT | Performed by: NURSE PRACTITIONER

## 2025-02-17 PROCEDURE — 36415 COLL VENOUS BLD VENIPUNCTURE: CPT | Performed by: NURSE PRACTITIONER

## 2025-02-17 PROCEDURE — 82607 VITAMIN B-12: CPT | Mod: AHULAB | Performed by: NURSE PRACTITIONER

## 2025-02-17 PROCEDURE — 2500000002 HC RX 250 W HCPCS SELF ADMINISTERED DRUGS (ALT 637 FOR MEDICARE OP, ALT 636 FOR OP/ED): Performed by: FAMILY MEDICINE

## 2025-02-17 PROCEDURE — 2500000002 HC RX 250 W HCPCS SELF ADMINISTERED DRUGS (ALT 637 FOR MEDICARE OP, ALT 636 FOR OP/ED): Performed by: INTERNAL MEDICINE

## 2025-02-17 PROCEDURE — 99223 1ST HOSP IP/OBS HIGH 75: CPT | Performed by: NURSE PRACTITIONER

## 2025-02-17 PROCEDURE — 99232 SBSQ HOSP IP/OBS MODERATE 35: CPT | Performed by: NURSE PRACTITIONER

## 2025-02-17 PROCEDURE — 1200000002 HC GENERAL ROOM WITH TELEMETRY DAILY

## 2025-02-17 PROCEDURE — 97535 SELF CARE MNGMENT TRAINING: CPT | Mod: GO

## 2025-02-17 PROCEDURE — 2500000001 HC RX 250 WO HCPCS SELF ADMINISTERED DRUGS (ALT 637 FOR MEDICARE OP)

## 2025-02-17 PROCEDURE — 2500000004 HC RX 250 GENERAL PHARMACY W/ HCPCS (ALT 636 FOR OP/ED)

## 2025-02-17 PROCEDURE — S4991 NICOTINE PATCH NONLEGEND: HCPCS | Performed by: INTERNAL MEDICINE

## 2025-02-17 PROCEDURE — 85610 PROTHROMBIN TIME: CPT | Performed by: NURSE PRACTITIONER

## 2025-02-17 PROCEDURE — 84100 ASSAY OF PHOSPHORUS: CPT

## 2025-02-17 PROCEDURE — 36415 COLL VENOUS BLD VENIPUNCTURE: CPT

## 2025-02-17 PROCEDURE — 2500000005 HC RX 250 GENERAL PHARMACY W/O HCPCS: Performed by: INTERNAL MEDICINE

## 2025-02-17 PROCEDURE — 85027 COMPLETE CBC AUTOMATED: CPT

## 2025-02-17 PROCEDURE — 97116 GAIT TRAINING THERAPY: CPT | Mod: GP

## 2025-02-17 PROCEDURE — 97530 THERAPEUTIC ACTIVITIES: CPT | Mod: GO

## 2025-02-17 PROCEDURE — 97530 THERAPEUTIC ACTIVITIES: CPT | Mod: GP

## 2025-02-17 RX ORDER — POTASSIUM CHLORIDE 20 MEQ/1
40 TABLET, EXTENDED RELEASE ORAL EVERY 4 HOURS
Status: COMPLETED | OUTPATIENT
Start: 2025-02-17 | End: 2025-02-17

## 2025-02-17 RX ADMIN — LEVOTHYROXINE SODIUM 137 MCG: 0.14 TABLET ORAL at 09:01

## 2025-02-17 RX ADMIN — ASPIRIN 81 MG: 81 TABLET, COATED ORAL at 09:00

## 2025-02-17 RX ADMIN — Medication 2 L/MIN: at 00:49

## 2025-02-17 RX ADMIN — Medication 100 MG: at 09:00

## 2025-02-17 RX ADMIN — LISINOPRIL 20 MG: 20 TABLET ORAL at 09:00

## 2025-02-17 RX ADMIN — HEPARIN SODIUM 5000 UNITS: 5000 INJECTION INTRAVENOUS; SUBCUTANEOUS at 13:42

## 2025-02-17 RX ADMIN — POTASSIUM CHLORIDE 40 MEQ: 1500 TABLET, EXTENDED RELEASE ORAL at 09:18

## 2025-02-17 RX ADMIN — HEPARIN SODIUM 5000 UNITS: 5000 INJECTION INTRAVENOUS; SUBCUTANEOUS at 21:05

## 2025-02-17 RX ADMIN — ACETAMINOPHEN 650 MG: 325 TABLET, FILM COATED ORAL at 21:05

## 2025-02-17 RX ADMIN — FOLIC ACID 0.5 MG: 1 TABLET ORAL at 09:00

## 2025-02-17 RX ADMIN — POTASSIUM CHLORIDE 40 MEQ: 1500 TABLET, EXTENDED RELEASE ORAL at 13:42

## 2025-02-17 RX ADMIN — HYDROCHLOROTHIAZIDE 12.5 MG: 12.5 TABLET ORAL at 09:00

## 2025-02-17 RX ADMIN — NICOTINE 1 PATCH: 14 PATCH, EXTENDED RELEASE TRANSDERMAL at 09:00

## 2025-02-17 RX ADMIN — LIDOCAINE 4% 1 PATCH: 40 PATCH TOPICAL at 09:12

## 2025-02-17 RX ADMIN — HEPARIN SODIUM 5000 UNITS: 5000 INJECTION INTRAVENOUS; SUBCUTANEOUS at 05:30

## 2025-02-17 RX ADMIN — ALPRAZOLAM 0.25 MG: 0.25 TABLET ORAL at 21:04

## 2025-02-17 ASSESSMENT — PAIN - FUNCTIONAL ASSESSMENT
PAIN_FUNCTIONAL_ASSESSMENT: 0-10

## 2025-02-17 ASSESSMENT — COGNITIVE AND FUNCTIONAL STATUS - GENERAL
TOILETING: A LOT
MOBILITY SCORE: 17
STANDING UP FROM CHAIR USING ARMS: A LITTLE
MOBILITY SCORE: 15
STANDING UP FROM CHAIR USING ARMS: A LOT
MOVING TO AND FROM BED TO CHAIR: A LITTLE
DAILY ACTIVITIY SCORE: 15
WALKING IN HOSPITAL ROOM: A LOT
MOVING FROM LYING ON BACK TO SITTING ON SIDE OF FLAT BED WITH BEDRAILS: A LITTLE
DAILY ACTIVITIY SCORE: 19
DRESSING REGULAR UPPER BODY CLOTHING: A LITTLE
MOVING FROM LYING ON BACK TO SITTING ON SIDE OF FLAT BED WITH BEDRAILS: A LITTLE
CLIMB 3 TO 5 STEPS WITH RAILING: A LOT
EATING MEALS: A LITTLE
DRESSING REGULAR UPPER BODY CLOTHING: A LITTLE
CLIMB 3 TO 5 STEPS WITH RAILING: A LOT
TURNING FROM BACK TO SIDE WHILE IN FLAT BAD: A LITTLE
DRESSING REGULAR LOWER BODY CLOTHING: A LITTLE
WALKING IN HOSPITAL ROOM: A LITTLE
TURNING FROM BACK TO SIDE WHILE IN FLAT BAD: A LITTLE
HELP NEEDED FOR BATHING: A LITTLE
TOILETING: A LITTLE
HELP NEEDED FOR BATHING: A LOT
DRESSING REGULAR LOWER BODY CLOTHING: A LOT
PERSONAL GROOMING: A LITTLE
PERSONAL GROOMING: A LITTLE
MOVING TO AND FROM BED TO CHAIR: A LITTLE

## 2025-02-17 ASSESSMENT — ENCOUNTER SYMPTOMS
CHILLS: 0
BRUISES/BLEEDS EASILY: 0
JOINT SWELLING: 0
HEADACHES: 0
HEMATURIA: 0
ACTIVITY CHANGE: 1
BLOOD IN STOOL: 0
DIARRHEA: 0
COUGH: 0
ARTHRALGIAS: 0
FEVER: 0
NAUSEA: 0
PALPITATIONS: 0
UNEXPECTED WEIGHT CHANGE: 0
VOMITING: 0
APPETITE CHANGE: 0
SHORTNESS OF BREATH: 0

## 2025-02-17 ASSESSMENT — PAIN SCALES - GENERAL
PAINLEVEL_OUTOF10: 0 - NO PAIN
PAINLEVEL_OUTOF10: 1
PAINLEVEL_OUTOF10: 0 - NO PAIN
PAINLEVEL_OUTOF10: 0 - NO PAIN
PAINLEVEL_OUTOF10: 8
PAINLEVEL_OUTOF10: 1
PAINLEVEL_OUTOF10: 0 - NO PAIN

## 2025-02-17 ASSESSMENT — PAIN DESCRIPTION - LOCATION: LOCATION: RIB CAGE

## 2025-02-17 ASSESSMENT — ACTIVITIES OF DAILY LIVING (ADL): HOME_MANAGEMENT_TIME_ENTRY: 15

## 2025-02-17 NOTE — CARE PLAN
The patient's goals for the shift include  feel better, eat today     The clinical goals for the shift include pt will remain HDS, will wean O2 needs, mobilize and up to bathroom for improving incontinence      Problem: Skin  Goal: Promote skin healing  Outcome: Progressing  Flowsheets (Taken 2/17/2025 150)  Promote skin healing:   Turn/reposition every 2 hours/use positioning/transfer devices   Protective dressings over bony prominences     Problem: Fall/Injury  Goal: Not fall by end of shift  Outcome: Progressing  Goal: Be free from injury by end of the shift  Outcome: Progressing     Problem: Pain - Adult  Goal: Verbalizes/displays adequate comfort level or baseline comfort level  Outcome: Progressing     Problem: Safety - Adult  Goal: Free from fall injury  Outcome: Progressing     Problem: Discharge Planning  Goal: Discharge to home or other facility with appropriate resources  Outcome: Progressing     Problem: Chronic Conditions and Co-morbidities  Goal: Patient's chronic conditions and co-morbidity symptoms are monitored and maintained or improved  Outcome: Progressing     Problem: Nutrition  Goal: Nutrient intake appropriate for maintaining nutritional needs  Outcome: Progressing

## 2025-02-17 NOTE — PROGRESS NOTES
Physical Therapy    Physical Therapy Treatment    Patient Name: Rashmi Oreilly  MRN: [id number]  Department: [facility]  Room: 429/429-A  Today's Date: 2/17/2025  Time Calculation  Start Time: 0945  Stop Time: 1015  Time Calculation (min): 30 min         Assessment/Plan   PT Assessment  Rehab Prognosis: Good  Barriers to Discharge Home: Caregiver assistance, Physical needs  Caregiver Assistance: Patient lives alone and/or does not have reliable caregiver assistance  Physical Needs: Stair navigation into home limited by function/safety, In-home setup navigation limited by function/safety, Ambulating household distances limited by function/safety, 24hr mobility assistance needed, 24hr ADL assistance needed  Evaluation/Treatment Tolerance: Patient tolerated treatment well  Medical Staff Made Aware: Yes  Strengths: Ability to acquire knowledge, Access to adaptive/assistive products, Attitude of self, Capable of completing ADLs semi/independent, Coping skills  Barriers to Participation: Comorbidities  End of Session Communication: Bedside nurse, Care Coordinator  Assessment Comment: PT treatment session focused on bed mobility, transfers (blocked drills) and gait training. Pt demonstrating substantial improvement since evaluation and now changing recommendation to LOW intensity therapy with 24/7 supervision at discharge  End of Session Patient Position: Up in chair, Alarm on     PT Plan  Treatment/Interventions: Bed mobility, Transfer training, Gait training, Stair training, Balance training, Neuromuscular re-education, Strengthening, Endurance training, Therapeutic exercise, Therapeutic activity, Home exercise program  PT Plan: Ongoing PT  PT Frequency: 4 times per week  PT Discharge Recommendations: Low intensity level of continued care  Equipment Recommended upon Discharge: Wheeled walker  PT Recommended Transfer Status: Assist x1, Assist x2  PT - OK to Discharge: Yes (Per POC)      General Visit Information:   PT   Visit  PT Received On: 02/17/25  General  Reason for Referral: Pt is a 68 yo female presenting following intentional drug overdose resulting in cardiac arrest requiring CPR and intubation due to respiratory failure, hypoxemia and suspected aspiration. Pt extubated on 2/12.  Referred By: Yevgeniy Zelaya DO  Past Medical History Relevant to Rehab:   Past Medical History:   Diagnosis Date    Anxiety     Cirrhosis (Multi)     Depression     Disease of thyroid gland     Hepatitis C     Spondylolysis, lumbar region        Family/Caregiver Present: Yes  Caregiver Feedback:  present (on phone throughout session)  Co-Treatment: OT  Co-Treatment Reason: for maximal pt safety and participation  Prior to Session Communication: Bedside nurse  Patient Position Received: Bed, 3 rail up, Alarm on  General Comment: Pt pleasant and agreeable to PT, pt requiring re-direction to tasks throughout session    Subjective   Precautions:  Precautions  Medical Precautions: Fall precautions, Oxygen therapy device and L/min     Date/Time Vitals Session Patient Position Pulse Resp SpO2 BP MAP (mmHg)    02/17/25 0944 --  --  74  --  97 %  139/88  --     02/17/25 1000 --  --  99  30  --  --  --     02/17/25 1100 --  --  70  37  --  --  --                 Objective   Pain:  Pain Assessment  Pain Assessment: 0-10  0-10 (Numeric) Pain Score: 0 - No pain (rib pain noted with mobility - none at rest)  Cognition:  Cognition  Overall Cognitive Status: Within Functional Limits  Orientation Level: Oriented X4  Selective Attention: Impaired  Safety/Judgement: Exceptions to WFL  Task Initiation: Initiates with cues  Coordination:  Movements are Fluid and Coordinated: Yes  Postural Control:  Static Sitting Balance  Static Sitting-Balance Support: No upper extremity supported, Feet supported  Static Sitting-Level of Assistance: Distant supervision  Static Sitting-Comment/Number of Minutes: 2 min  Static Standing Balance  Static Standing-Balance Support:  Bilateral upper extremity supported (FWW)  Static Standing-Level of Assistance: Close supervision  Static Standing-Comment/Number of Minutes: 2 min    Activity Tolerance:  Activity Tolerance  Endurance: Tolerates 30 min exercise with multiple rests  Treatments:  Bed Mobility  Bed Mobility: Yes  Bed Mobility 1  Bed Mobility 1: Supine to sitting  Level of Assistance 1: Minimum assistance  Bed Mobility Comments 1: assist at trunk, cues to sequence logroll d/t rib pain upon sitting up EOB    Ambulation/Gait Training  Ambulation/Gait Training Performed: Yes  Ambulation/Gait Training 1  Surface 1: Level tile  Device 1: Rolling walker  Gait Support Devices: Gait belt  Assistance 1: Contact guard  Quality of Gait 1:  (reduced B step length and gait wolf)  Comments/Distance (ft) 1: x2ft and x14ft (distance limited due to pt reporting lightheadedness (vitals WFL))  Transfers  Transfer: Yes  Transfer 1  Technique 1: Sit to stand, Stand to sit  Transfer Device 1: Gait belt, Walker  Transfer Level of Assistance 1: Contact guard, Close supervision  Trials/Comments 1: cues to push up from the bed with arms, cues to reach for arms rests on chair; pt completed 6-7 during session    Outcome Measures:  [facility] Basic Mobility  Turning from your back to your side while in a flat bed without using bedrails: A little  Moving from lying on your back to sitting on the side of a flat bed without using bedrails: A little  Moving to and from bed to chair (including a wheelchair): A little  Standing up from a chair using your arms (e.g. wheelchair or bedside chair): A little  To walk in hospital room: A little  Climbing 3-5 steps with railing: A lot  Basic Mobility - Total Score: 17    Education Documentation  Precautions, taught by Josue Spann, PT at 2/17/2025 11:37 AM.  Learner: Patient  Readiness: Acceptance  Method: Explanation  Response: Verbalizes Understanding    Body Mechanics, taught by Josue Spann, PT at 2/17/2025  11:37 AM.  Learner: Patient  Readiness: Acceptance  Method: Explanation  Response: Verbalizes Understanding    Mobility Training, taught by Josue Spann, PT at 2/17/2025 11:37 AM.  Learner: Patient  Readiness: Acceptance  Method: Explanation  Response: Verbalizes Understanding    Education Comments  No comments found.        OP EDUCATION:       Encounter Problems       Encounter Problems (Active)       Mobility       LTG - Patient will navigate 2 steps with rails/device (Not Progressing)       Start:  02/13/25    Expected End:  02/27/25       CGA         STG - Patient will ambulate (Progressing)       Start:  02/13/25    Expected End:  02/27/25       SBA using FWW >50ft            PT Transfers       STG - Patient will perform bed mobility (Progressing)       Start:  02/13/25    Expected End:  02/27/25       Min A         STG - Patient will transfer sit to and from stand (Progressing)       Start:  02/13/25    Expected End:  02/27/25       Min A            Pain - Adult

## 2025-02-17 NOTE — CONSULTS
"Psych Consult      Consult Requested By: Hamilton Escamilla    Reason for Consultation:  Substance abuse    HISTORY OF PRESENT ILLNESS    Per ED - 69 y.o. female with a PMH of cardiac arrest in 2010 s/p AICD ,  Afib, HTN HLD, chronically on xanax (11 years) panic disorder, remote hx of IVDU,, chronic Low back pain,  Chronic Hep C with cirrhosis, hypothyroid,  former smoker, presented to [facility] ED ? From [place]/t change in [place]. Allegedly a friend brought in a \" white powder\" and she snorted. Limited HPI, gathered from EMR and report, EMS report she was somnolent and lethargic, given 8mg of Narcan which did not yield  positive result. She was intubated in the ER.  No report of fall or trauma.   Her Utox came back positive Benzodiazepine, Cocaine and Fentanyl. Head CT was negative for bleed. Initial ABG was unremarkbale. Scr 1.33 ( previously was 1.43)      Interval Note: code blue was called, patient HR bertha down and became pulseless. 6-10 mins of CPR,  with ~1mg +0.5mg + 0 .1 epi given, in between sequence with brief rosc.. Ultimately obtained ROSC  See Code Run sheet.   Was started on Levo and Vasopressin.        HPI:  Pt is 69 yr old CF being seen for substance abuse    On eval pt is calm. Converses easily however she is tangential and guarded about how she arrived to the hospital. Pt needs frequent re-direction as she often speaks about her accomplishments regarding a [employer] school at [employer].  She is difficult to keep on track    Sue X2-3, unreliable historian. States that she came from her home at [place]? Denies that she arrived from nursing facility. States that she was \"doing it (drugs) in my car, I called my  because I felt strange and he found me with an ambulance\". Admits to using \"substance that made me feel weird. I knew I shouldn't have done it. I should have just thrown it away\"    Denies that she was at a nursing home as reported in ED.  Pt denies having any thoughts of " "wanting to harm herself currently.  Denies HX of SI. Has never made any attempt to harm self in the past. No plan or intent currently.   Denies HX of psychiatric hospitalizations  Admits to using cocaine \"sometimes in the past\". Pt denies that she uses drugs often.    Pt is not currently linked with psychiatric provider at this time  Rx   Xanax 0.25 mg daily PRN?  Gabapentin 400 mg TID    Pt states that she has PPHX LUPIS with panic. Has been on \"all those medications (SSRI when mentioned by name) and none of those did anything\"                PSYCHIATRIC REVIEW OF SYSTEMS  SI: Denies    Depression: Denies    Dominique: Denies    Panic: Denies    Generalized Anxiety: Excessive Worry, Restless, Irritable, and Tense    PTSD: Denies    OCD: Denies    Psychosis: Denies    Eating Disorder: Denies    Current Outpatient Medications   Medication Instructions    ALPRAZolam (XANAX) 0.25 mg, oral, Nightly    aspirin 81 mg, Daily    ergocalciferol (VITAMIN D-2) 100,000 Units, Weekly    felodipine ER (PLENDIL) 2.5 mg, Daily    gabapentin (NEURONTIN) 400 mg, 3 times daily    levothyroxine (SYNTHROID, LEVOXYL) 137 mcg, Daily    lisinopriL-hydrochlorothiazide 20-12.5 mg tablet 1 tablet, Daily        OARRS:   070    Past Medical History:   Diagnosis Date    Anxiety     Cirrhosis (Multi)     Depression     Disease of thyroid gland     Hepatitis C     Spondylolysis, lumbar region         Past Surgical History:   Procedure Laterality Date    PACEMAKER PLACEMENT      TOTAL HIP ARTHROPLASTY          No family history on file.     Social History     Substance and Sexual Activity   Alcohol Use Never        Social History     Substance and Sexual Activity   Drug Use Never        Social History     Tobacco Use   Smoking Status Every Day    Current packs/day: 1.00    Average packs/day: 1 pack/day for 6.0 years (6.0 ttl pk-yrs)    Types: Cigarettes    Passive exposure: Current   Smokeless Tobacco Never        MENTAL STATUS EXAM  Physical " Exam  Psychiatric:         Attention and Perception: Attention and perception normal.         Mood and Affect: Mood and affect normal.         Speech: Speech normal.         Behavior: Behavior normal. Behavior is cooperative.         Thought Content: Thought content normal.         Cognition and Memory: Cognition is impaired. Memory is impaired. She exhibits impaired recent memory and impaired remote memory.         Judgment: Judgment is inappropriate.         Vitals:    02/17/25 0944 02/17/25 1000 02/17/25 1100 02/17/25 1200   BP: 139/88      BP Location: Right arm      Patient Position:       Pulse: 74 99 70 68   Resp:  (!) 30 (!) 37 23   Temp:       TempSrc:       SpO2: 97%      Weight:       Height:            Recent Results (from the past 72 hours)   POCT GLUCOSE    Collection Time: 02/14/25 12:53 PM   Result Value Ref Range    POCT Glucose 105 (H) 74 - 99 mg/dL   Comprehensive Metabolic Panel    Collection Time: 02/15/25  5:25 AM   Result Value Ref Range    Glucose 93 74 - 99 mg/dL    Sodium 146 (H) 136 - 145 mmol/L    Potassium 3.5 3.5 - 5.3 mmol/L    Chloride 113 (H) 98 - 107 mmol/L    Bicarbonate 24 21 - 32 mmol/L    Anion Gap 13 10 - 20 mmol/L    Urea Nitrogen 23 6 - 23 mg/dL    Creatinine 1.32 (H) 0.50 - 1.05 mg/dL    eGFR 44 (L) >60 mL/min/1.73m*2    Calcium 8.5 (L) 8.6 - 10.3 mg/dL    Albumin 2.8 (L) 3.4 - 5.0 g/dL    Alkaline Phosphatase 88 33 - 136 U/L    Total Protein 5.9 (L) 6.4 - 8.2 g/dL    AST 35 9 - 39 U/L    Bilirubin, Total 1.8 (H) 0.0 - 1.2 mg/dL    ALT 26 7 - 45 U/L   Magnesium    Collection Time: 02/15/25  5:25 AM   Result Value Ref Range    Magnesium 1.75 1.60 - 2.40 mg/dL   CBC    Collection Time: 02/15/25  5:34 AM   Result Value Ref Range    WBC 8.3 4.4 - 11.3 x10*3/uL    nRBC 0.2 (H) 0.0 - 0.0 /100 WBCs    RBC 2.85 (L) 4.00 - 5.20 x10*6/uL    Hemoglobin 9.6 (L) 12.0 - 16.0 g/dL    Hematocrit 29.8 (L) 36.0 - 46.0 %     (H) 80 - 100 fL    MCH 33.7 26.0 - 34.0 pg    MCHC 32.2 32.0 -  36.0 g/dL    RDW 14.6 (H) 11.5 - 14.5 %    Platelets 65 (L) 150 - 450 x10*3/uL   POCT GLUCOSE    Collection Time: 02/15/25 10:40 AM   Result Value Ref Range    POCT Glucose 93 74 - 99 mg/dL   CBC    Collection Time: 02/16/25 12:52 PM   Result Value Ref Range    WBC 6.3 4.4 - 11.3 x10*3/uL    nRBC 1.3 (H) 0.0 - 0.0 /100 WBCs    RBC 2.90 (L) 4.00 - 5.20 x10*6/uL    Hemoglobin 9.7 (L) 12.0 - 16.0 g/dL    Hematocrit 30.0 (L) 36.0 - 46.0 %     (H) 80 - 100 fL    MCH 33.4 26.0 - 34.0 pg    MCHC 32.3 32.0 - 36.0 g/dL    RDW 15.4 (H) 11.5 - 14.5 %    Platelets 71 (L) 150 - 450 x10*3/uL   Renal Function Panel    Collection Time: 02/16/25 12:52 PM   Result Value Ref Range    Glucose 95 74 - 99 mg/dL    Sodium 145 136 - 145 mmol/L    Potassium 3.0 (L) 3.5 - 5.3 mmol/L    Chloride 111 (H) 98 - 107 mmol/L    Bicarbonate 26 21 - 32 mmol/L    Anion Gap 11 10 - 20 mmol/L    Urea Nitrogen 21 6 - 23 mg/dL    Creatinine 1.30 (H) 0.50 - 1.05 mg/dL    eGFR 45 (L) >60 mL/min/1.73m*2    Calcium 9.1 8.6 - 10.3 mg/dL    Phosphorus 2.3 (L) 2.5 - 4.9 mg/dL    Albumin 3.2 (L) 3.4 - 5.0 g/dL   SST TOP    Collection Time: 02/16/25  3:01 PM   Result Value Ref Range    Extra Tube Hold for add-ons.    CBC    Collection Time: 02/17/25  6:01 AM   Result Value Ref Range    WBC 6.1 4.4 - 11.3 x10*3/uL    nRBC 1.6 (H) 0.0 - 0.0 /100 WBCs    RBC 3.04 (L) 4.00 - 5.20 x10*6/uL    Hemoglobin 9.8 (L) 12.0 - 16.0 g/dL    Hematocrit 31.6 (L) 36.0 - 46.0 %     (H) 80 - 100 fL    MCH 32.2 26.0 - 34.0 pg    MCHC 31.0 (L) 32.0 - 36.0 g/dL    RDW 15.4 (H) 11.5 - 14.5 %    Platelets 65 (L) 150 - 450 x10*3/uL   Renal Function Panel    Collection Time: 02/17/25  6:01 AM   Result Value Ref Range    Glucose 115 (H) 74 - 99 mg/dL    Sodium 147 (H) 136 - 145 mmol/L    Potassium 3.0 (L) 3.5 - 5.3 mmol/L    Chloride 113 (H) 98 - 107 mmol/L    Bicarbonate 26 21 - 32 mmol/L    Anion Gap 11 10 - 20 mmol/L    Urea Nitrogen 20 6 - 23 mg/dL    Creatinine 1.33 (H)  0.50 - 1.05 mg/dL    eGFR 44 (L) >60 mL/min/1.73m*2    Calcium 8.7 8.6 - 10.3 mg/dL    Phosphorus 2.8 2.5 - 4.9 mg/dL    Albumin 3.1 (L) 3.4 - 5.0 g/dL        Vascular US upper extremity venous duplex left    Result Date: 2/17/2025  Interpreted By:  Jayden Clifford, STUDY: [facility] US UPPER EXTREMITY VENOUS DUPLEX LEFT;  2/16/2025 2:36 pm   INDICATION: Signs/Symptoms:Edema.   ,R60.0 Localized edema   COMPARISON: None.   ACCESSION NUMBER(S): [id number]   ORDERING CLINICIAN: FERNANDA DEWEY   TECHNIQUE: Vascular ultrasound of the  left upper extremity was performed. Evaluation was performed with grayscale, color, and spectral Doppler. When possible, compression views of the evaluated veins was also performed.   FINDINGS: Evaluation of the visualized portions of the internal jugular, subclavian, axillary, brachial, cephalic, and basilic veins was performed.   Note is made of occlusive thrombus within the cephalic vein. Partially occlusive thrombus also noted within the left basilic vein.   There is normal respiratory variation, normal compressibility, as well as normal color doppler signal in the remaining visualized vessels.       No sonographic evidence of deep venous thrombosis. Positive for superficial thrombosis of the left cephalic and basilic veins.   MACRO: None.   Signed by: Jayden Clifford 2/17/2025 8:05 AM Dictation workstation:   [id number]      ASSESSMENT AND PLAN  DX: LUPIS  Accidental overdose    PLAN:   Pt denies any SI, does not require inpatient psychiatric care at this time  Pt states that she will consider seeing a psychiatrist in the future, would like resources.  Please have SW provide resources  Facility may have psychiatric resources already in facility  Pt denies needing any resources for substance abuse  Plan will be for pt to return to facility?    Thank you for allowing us to participate in the care of this patient. We will sign off at this time.  .    I spent 60 minutes in the professional and overall  care of this patient.      Marcos Gillespie, APRN-CNP

## 2025-02-17 NOTE — CONSULTS
Inpatient consult to Hematology  Consult performed by: SAGRARIO Desouza-CNP  Consult ordered by: Arabella Strong MD  Reason for consult: Thrombocytopenia  Assessment/Recommendations: Thrombocytopenia, also noted chronic macrocytic anemia  Most likely 2/2 Hepatitis C and cirrhosis. Thrombocytopenia since at lease 2022 per EMR. Patient with current drug use and prior alcohol abuse, likely poor diet.  - platelets are stable  - no overt bleeding noted  - agree with r/o PE with CTA chest when renal function permits  - nutritional studies and DIC labs ordered  - recommend patient follow up with hepatology    Discussed with Dr. Orozco          Reason For Consult  Thrombocytopenia    History Of Present Illness  Rashmi Oreilly is a 69 y.o. female presenting with drug overdose. PMH of thrombocytopenia since at least 2022 per EMR,  HCV, cirrhosis, OA s/p Left SANDI, cardiac arrest 2/2 Vfib s/p [facility] placement 4/1/2010, Hypertension, Hyperlipidemia, Hypothyroidism s/p thyroidectomy for papillary thyroid cancer, Anxiety/depression, Panic disorder, Lyme disease 1995, Osteoarthritis s/p total knee arthroplasty, Drug and alcohol use.     In ED, High sensitivity troponin negative x 2.   K 4.2 BUN 26 creatinine 1.33 magnesium 1.48 TSH 1.16 Flu and covid negative initial VBG PH 7.45 Pco2 37 bicarb 25.7 lactate 1.3 ammonia 47 Urine toxicology positive for cocaine, benzodiazepines and fentanyl. She was intubated in ED  for airway protection and admitted to ICU.  Witnessed brief cardiac arrest, with acute respiratory failure with hypoxemia  in the setting of airway compromise as a consequence of toxic encephalopathy d/t cocaine, fentanyl and benzodiazapine overdose. Cardiac cath 2/14/25, LHC and RHC that showed No significant CAD. US of the BL LE negative for DVT, NM study on 2/12 intermediate probability for pulmonary embolism. US UE 2/16 with  No sonographic evidence of deep venous thrombosis. Positive for  superficial thrombosis of the left cephalic and basilic veins. Hematology is consulted for thrombocytopenia.     Past Medical History  She has a past medical history of Anxiety, Cirrhosis (Multi), Depression, Disease of thyroid gland, Hepatitis C, and Spondylolysis, lumbar region.    Surgical History  She has a past surgical history that includes Total hip arthroplasty and pacemaker placement.     Social History  She reports that she has been smoking cigarettes. She has a 6 pack-year smoking history. She has been exposed to tobacco smoke. She has never used smokeless tobacco. She reports that she does not drink alcohol and does not use drugs.    Family History  No family history on file.     Allergies  Cephalexin    Review of Systems   Constitutional:  Positive for activity change. Negative for appetite change, chills, fever and unexpected weight change.   HENT:  Negative for nosebleeds.    Eyes:  Negative for visual disturbance.   Respiratory:  Negative for cough and shortness of breath.    Cardiovascular:  Negative for palpitations and leg swelling.   Gastrointestinal:  Negative for blood in stool, diarrhea, nausea and vomiting.   Genitourinary:  Negative for hematuria.   Musculoskeletal:  Negative for arthralgias and joint swelling.   Skin:  Negative for rash.   Neurological:  Negative for headaches.   Hematological:  Does not bruise/bleed easily.        Physical Exam  Vitals and nursing note reviewed.   Constitutional:       General: She is not in acute distress.     Appearance: She is ill-appearing. She is not toxic-appearing.   HENT:      Head: Normocephalic and atraumatic.      Nose: No congestion.   Eyes:      General: No scleral icterus.  Cardiovascular:      Rate and Rhythm: Normal rate.      Heart sounds: Normal heart sounds.   Pulmonary:      Effort: No respiratory distress.      Breath sounds: Normal breath sounds.   Abdominal:      Palpations: Abdomen is soft.      Tenderness: There is no abdominal  "tenderness.   Musculoskeletal:      Right lower leg: No edema.      Left lower leg: No edema.   Lymphadenopathy:      Cervical: No cervical adenopathy.   Skin:     General: Skin is warm and dry.      Findings: No rash.   Neurological:      Mental Status: She is alert and oriented to person, place, and time.          Last Recorded Vitals  Blood pressure 139/88, pulse 68, temperature 36.5 °C (97.7 °F), temperature source Temporal, resp. rate 23, height 1.651 m (5' 5\"), weight 96.4 kg (212 lb 8.4 oz), SpO2 97%.    Relevant Results  Results for orders placed or performed during the hospital encounter of 02/10/25 (from the past 96 hours)   Basic Metabolic Panel   Result Value Ref Range    Glucose 144 (H) 74 - 99 mg/dL    Sodium 144 136 - 145 mmol/L    Potassium 4.5 3.5 - 5.3 mmol/L    Chloride 112 (H) 98 - 107 mmol/L    Bicarbonate 25 21 - 32 mmol/L    Anion Gap 12 10 - 20 mmol/L    Urea Nitrogen 30 (H) 6 - 23 mg/dL    Creatinine 1.60 (H) 0.50 - 1.05 mg/dL    eGFR 35 (L) >60 mL/min/1.73m*2    Calcium 8.7 8.6 - 10.3 mg/dL   POCT GLUCOSE   Result Value Ref Range    POCT Glucose 124 (H) 74 - 99 mg/dL   CBC and Auto Differential   Result Value Ref Range    WBC 8.6 4.4 - 11.3 x10*3/uL    nRBC 0.2 (H) 0.0 - 0.0 /100 WBCs    RBC 3.08 (L) 4.00 - 5.20 x10*6/uL    Hemoglobin 10.1 (L) 12.0 - 16.0 g/dL    Hematocrit 31.9 (L) 36.0 - 46.0 %     (H) 80 - 100 fL    MCH 32.8 26.0 - 34.0 pg    MCHC 31.7 (L) 32.0 - 36.0 g/dL    RDW 14.6 (H) 11.5 - 14.5 %    Platelets 57 (L) 150 - 450 x10*3/uL    Neutrophils % 65.7 40.0 - 80.0 %    Immature Granulocytes %, Automated 0.5 0.0 - 0.9 %    Lymphocytes % 21.0 13.0 - 44.0 %    Monocytes % 6.5 2.0 - 10.0 %    Eosinophils % 5.6 0.0 - 6.0 %    Basophils % 0.7 0.0 - 2.0 %    Neutrophils Absolute 5.65 1.20 - 7.70 x10*3/uL    Immature Granulocytes Absolute, Automated 0.04 0.00 - 0.70 x10*3/uL    Lymphocytes Absolute 1.81 1.20 - 4.80 x10*3/uL    Monocytes Absolute 0.56 0.10 - 1.00 x10*3/uL    " Eosinophils Absolute 0.48 0.00 - 0.70 x10*3/uL    Basophils Absolute 0.06 0.00 - 0.10 x10*3/uL   Comprehensive Metabolic Panel   Result Value Ref Range    Glucose 94 74 - 99 mg/dL    Sodium 145 136 - 145 mmol/L    Potassium 3.7 3.5 - 5.3 mmol/L    Chloride 113 (H) 98 - 107 mmol/L    Bicarbonate 27 21 - 32 mmol/L    Anion Gap 9 (L) 10 - 20 mmol/L    Urea Nitrogen 25 (H) 6 - 23 mg/dL    Creatinine 1.41 (H) 0.50 - 1.05 mg/dL    eGFR 41 (L) >60 mL/min/1.73m*2    Calcium 8.7 8.6 - 10.3 mg/dL    Albumin 2.8 (L) 3.4 - 5.0 g/dL    Alkaline Phosphatase 94 33 - 136 U/L    Total Protein 5.9 (L) 6.4 - 8.2 g/dL    AST 34 9 - 39 U/L    Bilirubin, Total 1.9 (H) 0.0 - 1.2 mg/dL    ALT 27 7 - 45 U/L   POCT GLUCOSE   Result Value Ref Range    POCT Glucose 105 (H) 74 - 99 mg/dL   Comprehensive Metabolic Panel   Result Value Ref Range    Glucose 93 74 - 99 mg/dL    Sodium 146 (H) 136 - 145 mmol/L    Potassium 3.5 3.5 - 5.3 mmol/L    Chloride 113 (H) 98 - 107 mmol/L    Bicarbonate 24 21 - 32 mmol/L    Anion Gap 13 10 - 20 mmol/L    Urea Nitrogen 23 6 - 23 mg/dL    Creatinine 1.32 (H) 0.50 - 1.05 mg/dL    eGFR 44 (L) >60 mL/min/1.73m*2    Calcium 8.5 (L) 8.6 - 10.3 mg/dL    Albumin 2.8 (L) 3.4 - 5.0 g/dL    Alkaline Phosphatase 88 33 - 136 U/L    Total Protein 5.9 (L) 6.4 - 8.2 g/dL    AST 35 9 - 39 U/L    Bilirubin, Total 1.8 (H) 0.0 - 1.2 mg/dL    ALT 26 7 - 45 U/L   Magnesium   Result Value Ref Range    Magnesium 1.75 1.60 - 2.40 mg/dL   CBC   Result Value Ref Range    WBC 8.3 4.4 - 11.3 x10*3/uL    nRBC 0.2 (H) 0.0 - 0.0 /100 WBCs    RBC 2.85 (L) 4.00 - 5.20 x10*6/uL    Hemoglobin 9.6 (L) 12.0 - 16.0 g/dL    Hematocrit 29.8 (L) 36.0 - 46.0 %     (H) 80 - 100 fL    MCH 33.7 26.0 - 34.0 pg    MCHC 32.2 32.0 - 36.0 g/dL    RDW 14.6 (H) 11.5 - 14.5 %    Platelets 65 (L) 150 - 450 x10*3/uL   POCT GLUCOSE   Result Value Ref Range    POCT Glucose 93 74 - 99 mg/dL   CBC   Result Value Ref Range    WBC 6.3 4.4 - 11.3 x10*3/uL    nRBC  1.3 (H) 0.0 - 0.0 /100 WBCs    RBC 2.90 (L) 4.00 - 5.20 x10*6/uL    Hemoglobin 9.7 (L) 12.0 - 16.0 g/dL    Hematocrit 30.0 (L) 36.0 - 46.0 %     (H) 80 - 100 fL    MCH 33.4 26.0 - 34.0 pg    MCHC 32.3 32.0 - 36.0 g/dL    RDW 15.4 (H) 11.5 - 14.5 %    Platelets 71 (L) 150 - 450 x10*3/uL   Renal Function Panel   Result Value Ref Range    Glucose 95 74 - 99 mg/dL    Sodium 145 136 - 145 mmol/L    Potassium 3.0 (L) 3.5 - 5.3 mmol/L    Chloride 111 (H) 98 - 107 mmol/L    Bicarbonate 26 21 - 32 mmol/L    Anion Gap 11 10 - 20 mmol/L    Urea Nitrogen 21 6 - 23 mg/dL    Creatinine 1.30 (H) 0.50 - 1.05 mg/dL    eGFR 45 (L) >60 mL/min/1.73m*2    Calcium 9.1 8.6 - 10.3 mg/dL    Phosphorus 2.3 (L) 2.5 - 4.9 mg/dL    Albumin 3.2 (L) 3.4 - 5.0 g/dL   SST TOP   Result Value Ref Range    Extra Tube Hold for add-ons.    CBC   Result Value Ref Range    WBC 6.1 4.4 - 11.3 x10*3/uL    nRBC 1.6 (H) 0.0 - 0.0 /100 WBCs    RBC 3.04 (L) 4.00 - 5.20 x10*6/uL    Hemoglobin 9.8 (L) 12.0 - 16.0 g/dL    Hematocrit 31.6 (L) 36.0 - 46.0 %     (H) 80 - 100 fL    MCH 32.2 26.0 - 34.0 pg    MCHC 31.0 (L) 32.0 - 36.0 g/dL    RDW 15.4 (H) 11.5 - 14.5 %    Platelets 65 (L) 150 - 450 x10*3/uL   Renal Function Panel   Result Value Ref Range    Glucose 115 (H) 74 - 99 mg/dL    Sodium 147 (H) 136 - 145 mmol/L    Potassium 3.0 (L) 3.5 - 5.3 mmol/L    Chloride 113 (H) 98 - 107 mmol/L    Bicarbonate 26 21 - 32 mmol/L    Anion Gap 11 10 - 20 mmol/L    Urea Nitrogen 20 6 - 23 mg/dL    Creatinine 1.33 (H) 0.50 - 1.05 mg/dL    eGFR 44 (L) >60 mL/min/1.73m*2    Calcium 8.7 8.6 - 10.3 mg/dL    Phosphorus 2.8 2.5 - 4.9 mg/dL    Albumin 3.1 (L) 3.4 - 5.0 g/dL        I spent 60 minutes in the professional and overall care of this patient.

## 2025-02-17 NOTE — PROGRESS NOTES
Occupational Therapy    OT Treatment    Patient Name: Rashmi Oreilly  MRN: [id number]  Department: [facility]  Room: 429/429-A  Today's Date: 2/17/2025  Time Calculation  Start Time: 0944  Stop Time: 1014  Time Calculation (min): 30 min        Assessment:  OT Assessment: Pt progressing with OT completing ADLs and functional mobility with CGA-SUP. Pt requiring cues to re-direct to tasks during session.  Barriers to Discharge Home: No anticipated barriers  Evaluation/Treatment Tolerance: Patient tolerated treatment well  Medical Staff Made Aware: Yes  End of Session Communication: Bedside nurse, Care Coordinator  End of Session Patient Position: Up in chair, Alarm on  OT Assessment Results: Decreased ADL status, Decreased safe judgment during ADL, Decreased cognition, Decreased endurance, Decreased functional mobility, Decreased IADLs  Barriers to Discharge: None  Evaluation/Treatment Tolerance: Patient tolerated treatment well  Medical Staff Made Aware: Yes  Strengths: Ability to acquire knowledge, Access to adaptive/assistive products, Attitude of self, Capable of completing ADLs semi/independent, Coping skills  Barriers to Participation: Comorbidities  Plan:  Treatment Interventions: ADL retraining, Functional transfer training, UE strengthening/ROM, Endurance training, Compensatory technique education  OT Frequency: 3 times per week  OT Discharge Recommendations: Low intensity level of continued care  Equipment Recommended upon Discharge: Wheeled walker  OT Recommended Transfer Status: Assist of 1  OT - OK to Discharge: Yes  Treatment Interventions: ADL retraining, Functional transfer training, UE strengthening/ROM, Endurance training, Compensatory technique education    Subjective   Previous Visit Info:  OT Last Visit  OT Received On: 02/17/25  General:  General  Reason for Referral: Pt is a 68 yo female presenting following intentional drug overdose resulting in cardiac arrest requiring CPR and intubation due to  respiratory failure, hypoxemia and suspected aspiration. Pt extubated on 2/12.  Referred By: Yevgeniy Zelaya DO  Past Medical History Relevant to Rehab:   Past Medical History:   Diagnosis Date    Anxiety     Cirrhosis (Multi)     Depression     Disease of thyroid gland     Hepatitis C     Spondylolysis, lumbar region      Family/Caregiver Present: Yes  Caregiver Feedback:  present (on phone throughout session)  Co-Treatment: PT  Co-Treatment Reason: for maximal pt safety and participation  Prior to Session Communication: Bedside nurse  Patient Position Received: Bed, 3 rail up, Alarm on  Preferred Learning Style: auditory, kinesthetic  General Comment: Pt pleasant and agreeable to OT, pt requiring re-direction to tasks throughout session  Precautions:  Medical Precautions: Fall precautions, Oxygen therapy device and L/min (Rib for comfort)     Date/Time Vitals Session Patient Position Pulse Resp SpO2 BP MAP (mmHg)    02/17/25 0944 --  --  74  --  97 %  139/88  --     02/17/25 1000 --  --  99  30  --  --  --     02/17/25 1100 --  --  70  37  --  --  --           Vital Signs Comment: VSS throughout, pt reporting some dizziness during functional mobility (RN notified)     Pain:  Pain Assessment  Pain Assessment: 0-10  0-10 (Numeric) Pain Score: 0 - No pain    Objective    Cognition:  Cognition  Overall Cognitive Status: Within Functional Limits  Orientation Level: Oriented X4  Selective Attention: Impaired  Safety/Judgement: Exceptions to WFL  Task Initiation: Initiates with cues  Coordination:  Movements are Fluid and Coordinated: Yes  Activities of Daily Living: Grooming  Grooming Level of Assistance: Setup, Close supervision  Grooming Where Assessed:  (standing from chair with tray, SUP-CGA for safety, intermittent no UE support)  Grooming Comments: pt tolerated brushing teeth while standing, rest break following task due to fatigue, VSS  Functional Standing Tolerance:  Time: ~3 minutes  Activity: brushing  teeth and functional mobility  Bed Mobility/Transfers: Bed Mobility  Bed Mobility: Yes  Bed Mobility 1  Bed Mobility 1: Supine to sitting  Level of Assistance 1: Minimum assistance  Bed Mobility Comments 1: assist at trunk, cues to sequence logroll d/t rib pain upon sitting up EOB    Transfers  Transfer: Yes  Transfer 1  Technique 1: Sit to stand, Stand to sit  Transfer Device 1: Gait belt, Walker  Transfer Level of Assistance 1: Contact guard, Close supervision  Trials/Comments 1: cues to push up from the bed with arms, cues to reach for arms rests on chair; pt completed 6-7 during session  Transfers 2  Transfer From 2: Bed to  Transfer to 2: Chair with arms  Technique 2: Stand pivot  Transfer Device 2: Gait belt, Walker  Transfer Level of Assistance 2: Contact guard    Functional Mobility:  Functional Mobility  Functional Mobility Performed: Yes  Functional Mobility 1  Surface 1: Level tile  Device 1: Rolling walker  Assistance 1: Contact guard  Comments 1: pt ambulated min household distance, turning and ambulating back to the chair, pt reporting min dizziness, vitals assessed and WFL (documented)  Sitting Balance:  Static Sitting Balance  Static Sitting-Balance Support: Feet supported  Static Sitting-Level of Assistance: Distant supervision  Static Sitting-Comment/Number of Minutes: good balance  Standing Balance:  Static Standing Balance  Static Standing-Balance Support: No upper extremity supported  Static Standing-Level of Assistance: Contact guard    Outcome Measures:[facility] Daily Activity  Putting on and taking off regular lower body clothing: A little  Bathing (including washing, rinsing, drying): A little  Putting on and taking off regular upper body clothing: A little  Toileting, which includes using toilet, bedpan or urinal: A little  Taking care of personal grooming such as brushing teeth: A little  Eating Meals: None  Daily Activity - Total Score: 19    Education Documentation  Handouts, taught by  Viridiana Miller OT at 2/17/2025 11:10 AM.  Learner: Patient  Readiness: Acceptance  Method: Explanation, Demonstration  Response: Verbalizes Understanding, Demonstrated Understanding, Needs Reinforcement    Body Mechanics, taught by Viridiana Miller OT at 2/17/2025 11:10 AM.  Learner: Patient  Readiness: Acceptance  Method: Explanation, Demonstration  Response: Verbalizes Understanding, Demonstrated Understanding, Needs Reinforcement    Precautions, taught by Viridiana Miller OT at 2/17/2025 11:10 AM.  Learner: Patient  Readiness: Acceptance  Method: Explanation, Demonstration  Response: Verbalizes Understanding, Demonstrated Understanding, Needs Reinforcement    Home Exercise Program, taught by Viridiana Miller OT at 2/17/2025 11:10 AM.  Learner: Patient  Readiness: Acceptance  Method: Explanation, Demonstration  Response: Verbalizes Understanding, Demonstrated Understanding, Needs Reinforcement    ADL Training, taught by Viridiana Miller OT at 2/17/2025 11:10 AM.  Learner: Patient  Readiness: Acceptance  Method: Explanation, Demonstration  Response: Verbalizes Understanding, Demonstrated Understanding, Needs Reinforcement    Education Comments  No comments found.           Goals:  Encounter Problems       Encounter Problems (Active)       ADLs       Patient will perform UB and LB bathing with set-up level of assistance. (Progressing)       Start:  02/13/25    Expected End:  02/27/25            Patient with complete upper body dressing with modified independent level of assistance donning and doffing all UE clothes . (Progressing)       Start:  02/13/25    Expected End:  02/27/25            Patient with complete lower body dressing with modified independent level of assistance donning and doffing all LE clothes  with PRN adaptive equipment. (Progressing)       Start:  02/13/25    Expected End:  02/27/25            Patient will complete daily grooming tasks brushing teeth and washing face/hair with modified independent level  of assistance and PRN adaptive equipment while standing. (Progressing)       Start:  02/13/25    Expected End:  02/27/25               MOBILITY       Patient will perform Functional mobility mod  Household distances/Community Distances with supervision level of assistance and least restrictive device in order to improve safety and functional mobility. (Progressing)       Start:  02/13/25    Expected End:  02/27/25               TRANSFERS       Patient will complete sit to stand transfer with supervision level of assistance and least restrictive device in order to improve safety and prepare for out of bed mobility. (Progressing)       Start:  02/13/25    Expected End:  02/27/25                  Encounter Problems (Resolved)       COGNITION/SAFETY       Patient will follow 100% Complex commands to allow improved ADL performance. (Met)       Start:  02/13/25    Expected End:  02/27/25    Resolved:  02/17/25         Patient will demonstrated orientation x 4 with no cues. (Met)       Start:  02/13/25    Expected End:  02/27/25    Resolved:  02/17/25    ORIENTATION

## 2025-02-17 NOTE — RESEARCH NOTES
Artificial Intelligence Monitoring in Nursing (AIMS Nursing) Study    Principle Investigator - Dr. Reji Orta  Research Coordinator - David Cordoba RN     Patient Name - Rashmi Oreilly  Date - 2/17/2025 8:58 AM  Location - 4th Floor ICU, [facility]    Rashmi Oreilly was approached by David Cordoba RN to talk about participating in the AIMS Nursing Study. The patient was not able to be approached, a research coordinator will come back at a later time. Study protocol was followed and patient was given study contact information.     David Cordoba RN

## 2025-02-18 VITALS
HEIGHT: 65 IN | BODY MASS INDEX: 34.66 KG/M2 | TEMPERATURE: 97.5 F | RESPIRATION RATE: 20 BRPM | DIASTOLIC BLOOD PRESSURE: 86 MMHG | OXYGEN SATURATION: 96 % | HEART RATE: 72 BPM | WEIGHT: 208 LBS | SYSTOLIC BLOOD PRESSURE: 143 MMHG

## 2025-02-18 LAB
ALBUMIN SERPL BCP-MCNC: 2.9 G/DL (ref 3.4–5)
ANION GAP SERPL CALC-SCNC: 12 MMOL/L (ref 10–20)
BASOPHILS # BLD MANUAL: 0 X10*3/UL (ref 0–0.1)
BASOPHILS NFR BLD MANUAL: 0 %
BUN SERPL-MCNC: 17 MG/DL (ref 6–23)
CALCIUM SERPL-MCNC: 8.5 MG/DL (ref 8.6–10.3)
CHLORIDE SERPL-SCNC: 113 MMOL/L (ref 98–107)
CO2 SERPL-SCNC: 24 MMOL/L (ref 21–32)
CREAT SERPL-MCNC: 1.08 MG/DL (ref 0.5–1.05)
DACRYOCYTES BLD QL SMEAR: ABNORMAL
EGFRCR SERPLBLD CKD-EPI 2021: 56 ML/MIN/1.73M*2
EOSINOPHIL # BLD MANUAL: 0 X10*3/UL (ref 0–0.7)
EOSINOPHIL NFR BLD MANUAL: 0 %
ERYTHROCYTE [DISTWIDTH] IN BLOOD BY AUTOMATED COUNT: 15.8 % (ref 11.5–14.5)
GLUCOSE SERPL-MCNC: 94 MG/DL (ref 74–99)
HCT VFR BLD AUTO: 29.2 % (ref 36–46)
HGB BLD-MCNC: 9.2 G/DL (ref 12–16)
HGB RETIC QN: 37 PG (ref 28–38)
IMM GRANULOCYTES # BLD AUTO: 0.09 X10*3/UL (ref 0–0.7)
IMM GRANULOCYTES NFR BLD AUTO: 1.4 % (ref 0–0.9)
IMMATURE RETIC FRACTION: 28.4 %
LDH SERPL L TO P-CCNC: 374 U/L (ref 84–246)
LYMPHOCYTES # BLD MANUAL: 0.72 X10*3/UL (ref 1.2–4.8)
LYMPHOCYTES NFR BLD MANUAL: 11 %
MCH RBC QN AUTO: 32.9 PG (ref 26–34)
MCHC RBC AUTO-ENTMCNC: 31.5 G/DL (ref 32–36)
MCV RBC AUTO: 104 FL (ref 80–100)
MONOCYTES # BLD MANUAL: 0.2 X10*3/UL (ref 0.1–1)
MONOCYTES NFR BLD MANUAL: 3 %
NEUTS SEG # BLD MANUAL: 5.59 X10*3/UL (ref 1.2–7)
NEUTS SEG NFR BLD MANUAL: 86 %
NRBC BLD-RTO: 1.1 /100 WBCS (ref 0–0)
PHOSPHATE SERPL-MCNC: 2.3 MG/DL (ref 2.5–4.9)
PLATELET # BLD AUTO: 64 X10*3/UL (ref 150–450)
POLYCHROMASIA BLD QL SMEAR: ABNORMAL
POTASSIUM SERPL-SCNC: 3.1 MMOL/L (ref 3.5–5.3)
RBC # BLD AUTO: 2.8 X10*6/UL (ref 4–5.2)
RBC MORPH BLD: ABNORMAL
RETICS #: 0.12 X10*6/UL (ref 0.02–0.11)
RETICS/RBC NFR AUTO: 4.2 % (ref 0.5–2)
SODIUM SERPL-SCNC: 146 MMOL/L (ref 136–145)
TOTAL CELLS COUNTED BLD: 100
WBC # BLD AUTO: 6.5 X10*3/UL (ref 4.4–11.3)

## 2025-02-18 PROCEDURE — 80069 RENAL FUNCTION PANEL: CPT | Performed by: INTERNAL MEDICINE

## 2025-02-18 PROCEDURE — 2500000001 HC RX 250 WO HCPCS SELF ADMINISTERED DRUGS (ALT 637 FOR MEDICARE OP): Performed by: INTERNAL MEDICINE

## 2025-02-18 PROCEDURE — 85045 AUTOMATED RETICULOCYTE COUNT: CPT | Performed by: NURSE PRACTITIONER

## 2025-02-18 PROCEDURE — 36415 COLL VENOUS BLD VENIPUNCTURE: CPT | Performed by: NURSE PRACTITIONER

## 2025-02-18 PROCEDURE — 2500000002 HC RX 250 W HCPCS SELF ADMINISTERED DRUGS (ALT 637 FOR MEDICARE OP, ALT 636 FOR OP/ED): Performed by: INTERNAL MEDICINE

## 2025-02-18 PROCEDURE — 1100000001 HC PRIVATE ROOM DAILY

## 2025-02-18 PROCEDURE — 83615 LACTATE (LD) (LDH) ENZYME: CPT | Performed by: NURSE PRACTITIONER

## 2025-02-18 PROCEDURE — 2500000004 HC RX 250 GENERAL PHARMACY W/ HCPCS (ALT 636 FOR OP/ED): Performed by: INTERNAL MEDICINE

## 2025-02-18 PROCEDURE — 85007 BL SMEAR W/DIFF WBC COUNT: CPT | Performed by: NURSE PRACTITIONER

## 2025-02-18 PROCEDURE — 2500000004 HC RX 250 GENERAL PHARMACY W/ HCPCS (ALT 636 FOR OP/ED): Performed by: FAMILY MEDICINE

## 2025-02-18 PROCEDURE — 2500000002 HC RX 250 W HCPCS SELF ADMINISTERED DRUGS (ALT 637 FOR MEDICARE OP, ALT 636 FOR OP/ED): Performed by: FAMILY MEDICINE

## 2025-02-18 PROCEDURE — S4991 NICOTINE PATCH NONLEGEND: HCPCS | Performed by: INTERNAL MEDICINE

## 2025-02-18 PROCEDURE — 85027 COMPLETE CBC AUTOMATED: CPT | Performed by: NURSE PRACTITIONER

## 2025-02-18 PROCEDURE — 2500000005 HC RX 250 GENERAL PHARMACY W/O HCPCS: Performed by: INTERNAL MEDICINE

## 2025-02-18 RX ORDER — FUROSEMIDE 10 MG/ML
40 INJECTION INTRAMUSCULAR; INTRAVENOUS ONCE
Status: COMPLETED | OUTPATIENT
Start: 2025-02-18 | End: 2025-02-18

## 2025-02-18 RX ORDER — POTASSIUM CHLORIDE 20 MEQ/1
40 TABLET, EXTENDED RELEASE ORAL EVERY 4 HOURS
Status: DISCONTINUED | OUTPATIENT
Start: 2025-02-18 | End: 2025-02-19 | Stop reason: HOSPADM

## 2025-02-18 RX ADMIN — FOLIC ACID 0.5 MG: 1 TABLET ORAL at 09:18

## 2025-02-18 RX ADMIN — HEPARIN SODIUM 5000 UNITS: 5000 INJECTION INTRAVENOUS; SUBCUTANEOUS at 06:21

## 2025-02-18 RX ADMIN — POLYETHYLENE GLYCOL 3350 17 G: 17 POWDER, FOR SOLUTION ORAL at 09:18

## 2025-02-18 RX ADMIN — LIDOCAINE 4% 1 PATCH: 40 PATCH TOPICAL at 09:18

## 2025-02-18 RX ADMIN — NICOTINE 1 PATCH: 14 PATCH, EXTENDED RELEASE TRANSDERMAL at 09:17

## 2025-02-18 RX ADMIN — Medication 100 MG: at 09:18

## 2025-02-18 RX ADMIN — ASPIRIN 81 MG: 81 TABLET, COATED ORAL at 09:18

## 2025-02-18 RX ADMIN — HEPARIN SODIUM 5000 UNITS: 5000 INJECTION INTRAVENOUS; SUBCUTANEOUS at 14:55

## 2025-02-18 RX ADMIN — PSYLLIUM HUSK 1 PACKET: 3.4 POWDER ORAL at 09:18

## 2025-02-18 RX ADMIN — FUROSEMIDE 40 MG: 10 INJECTION, SOLUTION INTRAMUSCULAR; INTRAVENOUS at 16:45

## 2025-02-18 RX ADMIN — LISINOPRIL 20 MG: 20 TABLET ORAL at 09:19

## 2025-02-18 RX ADMIN — HYDROCHLOROTHIAZIDE 12.5 MG: 12.5 TABLET ORAL at 09:18

## 2025-02-18 RX ADMIN — LEVOTHYROXINE SODIUM 137 MCG: 0.14 TABLET ORAL at 09:18

## 2025-02-18 RX ADMIN — ALPRAZOLAM 0.25 MG: 0.25 TABLET ORAL at 14:48

## 2025-02-18 RX ADMIN — POTASSIUM CHLORIDE 40 MEQ: 1500 TABLET, EXTENDED RELEASE ORAL at 16:45

## 2025-02-18 ASSESSMENT — PAIN SCALES - GENERAL: PAINLEVEL_OUTOF10: 0 - NO PAIN

## 2025-02-18 NOTE — PROGRESS NOTES
02/18/25 0815   Discharge Planning   Expected Discharge Disposition Home H     Per notes plan on discharge is to return home with , patient has refused placement several times, psych following per notes patient does not require inpatient status, has hx of ETOH and drug abuse, PT/OT both rec'd low on 2/17, patient willing to have [facility] on discharge, I will continue to monitor for discharge planing and home going needs.

## 2025-02-18 NOTE — PROGRESS NOTES
Rashmi Oreilly is a 69 y.o. female on day 8 of admission presenting with Intentional drug overdose, initial encounter (Multi).      Subjective   Pt seen in icu    I sby bedside  She is admitted for drug overdose fentanyl, benzo and cocaine +ve  And did have cardiac arrest while in hospital resuscitated  Today feel luna  No pain  No difficulty breathing        Objective     Last Recorded Vitals  BP (!) 147/95 (BP Location: Left arm, Patient Position: Lying)   Pulse 59   Temp 36.4 °C (97.5 °F) (Temporal)   Resp 22   Wt 96.4 kg (212 lb 8.4 oz)   SpO2 93%   Intake/Output last 3 Shifts:    Intake/Output Summary (Last 24 hours) at 2/18/2025 0615  Last data filed at 2/17/2025 0800  Gross per 24 hour   Intake 480 ml   Output --   Net 480 ml       Admission Weight  Weight: 93.3 kg (205 lb 11 oz) (02/10/25 1510)    Daily Weight  02/16/25 : 96.4 kg (212 lb 8.4 oz)    Image Results  Vascular US upper extremity venous duplex left  Narrative: Interpreted By:  Jayden Clifford,   STUDY:  [facility] US UPPER EXTREMITY VENOUS DUPLEX LEFT;  2/16/2025 2:36 pm      INDICATION:  Signs/Symptoms:Edema.      ,R60.0 Localized edema      COMPARISON:  None.      ACCESSION NUMBER(S):  [id number]      ORDERING CLINICIAN:  FERNANDA DEWEY      TECHNIQUE:  Vascular ultrasound of the  left upper extremity was performed.  Evaluation was performed with grayscale, color, and spectral Doppler.  When possible, compression views of the evaluated veins was also  performed.      FINDINGS:  Evaluation of the visualized portions of the internal jugular,  subclavian, axillary, brachial, cephalic, and basilic veins was  performed.      Note is made of occlusive thrombus within the cephalic vein.  Partially occlusive thrombus also noted within the left basilic vein.      There is normal respiratory variation, normal compressibility, as  well as normal color doppler signal in the remaining visualized  vessels.      Impression: No sonographic evidence of deep  venous thrombosis. Positive for  superficial thrombosis of the left cephalic and basilic veins.      MACRO:  None.      Signed by: Jayden Clifford 2/17/2025 8:05 AM  Dictation workstation:   [id number]      Physical Exam    Obese  No distress  Appropirate to simple questions  Chest clear  CVS regular   Ext edema present   Stasis changes present   Abdo soft nontende rbs active no masses  Cns alert appropriate     Relevant Results    Scheduled medications  ALPRAZolam, 0.25 mg, oral, Nightly  aspirin, 81 mg, oral, Daily  folic acid, 0.5 mg, oral, Daily  [Held by provider] gabapentin, 400 mg, oral, TID  heparin (porcine), 5,000 Units, subcutaneous, q8h TANK  lisinopril, 20 mg, oral, Daily   And  hydroCHLOROthiazide, 12.5 mg, oral, Daily  levothyroxine, 137 mcg, oral, Daily  lidocaine, 1 patch, transdermal, Daily  multivitamin with minerals, 1 tablet, oral, Daily  nicotine, 1 patch, transdermal, Daily  oxygen, , inhalation, Continuous - Inhalation  oxygen, , inhalation, Continuous - 02/gases  polyethylene glycol, 17 g, oral, Daily  psyllium, 1 packet, oral, Daily  thiamine, 100 mg, oral, Daily      Continuous medications     PRN medications  PRN medications: acetaminophen **OR** acetaminophen, ALPRAZolam  Results for orders placed or performed during the hospital encounter of 02/10/25 (from the past 96 hours)   POCT GLUCOSE   Result Value Ref Range    POCT Glucose 105 (H) 74 - 99 mg/dL   Comprehensive Metabolic Panel   Result Value Ref Range    Glucose 93 74 - 99 mg/dL    Sodium 146 (H) 136 - 145 mmol/L    Potassium 3.5 3.5 - 5.3 mmol/L    Chloride 113 (H) 98 - 107 mmol/L    Bicarbonate 24 21 - 32 mmol/L    Anion Gap 13 10 - 20 mmol/L    Urea Nitrogen 23 6 - 23 mg/dL    Creatinine 1.32 (H) 0.50 - 1.05 mg/dL    eGFR 44 (L) >60 mL/min/1.73m*2    Calcium 8.5 (L) 8.6 - 10.3 mg/dL    Albumin 2.8 (L) 3.4 - 5.0 g/dL    Alkaline Phosphatase 88 33 - 136 U/L    Total Protein 5.9 (L) 6.4 - 8.2 g/dL    AST 35 9 - 39 U/L    Bilirubin,  Total 1.8 (H) 0.0 - 1.2 mg/dL    ALT 26 7 - 45 U/L   Magnesium   Result Value Ref Range    Magnesium 1.75 1.60 - 2.40 mg/dL   CBC   Result Value Ref Range    WBC 8.3 4.4 - 11.3 x10*3/uL    nRBC 0.2 (H) 0.0 - 0.0 /100 WBCs    RBC 2.85 (L) 4.00 - 5.20 x10*6/uL    Hemoglobin 9.6 (L) 12.0 - 16.0 g/dL    Hematocrit 29.8 (L) 36.0 - 46.0 %     (H) 80 - 100 fL    MCH 33.7 26.0 - 34.0 pg    MCHC 32.2 32.0 - 36.0 g/dL    RDW 14.6 (H) 11.5 - 14.5 %    Platelets 65 (L) 150 - 450 x10*3/uL   POCT GLUCOSE   Result Value Ref Range    POCT Glucose 93 74 - 99 mg/dL   CBC   Result Value Ref Range    WBC 6.3 4.4 - 11.3 x10*3/uL    nRBC 1.3 (H) 0.0 - 0.0 /100 WBCs    RBC 2.90 (L) 4.00 - 5.20 x10*6/uL    Hemoglobin 9.7 (L) 12.0 - 16.0 g/dL    Hematocrit 30.0 (L) 36.0 - 46.0 %     (H) 80 - 100 fL    MCH 33.4 26.0 - 34.0 pg    MCHC 32.3 32.0 - 36.0 g/dL    RDW 15.4 (H) 11.5 - 14.5 %    Platelets 71 (L) 150 - 450 x10*3/uL   Renal Function Panel   Result Value Ref Range    Glucose 95 74 - 99 mg/dL    Sodium 145 136 - 145 mmol/L    Potassium 3.0 (L) 3.5 - 5.3 mmol/L    Chloride 111 (H) 98 - 107 mmol/L    Bicarbonate 26 21 - 32 mmol/L    Anion Gap 11 10 - 20 mmol/L    Urea Nitrogen 21 6 - 23 mg/dL    Creatinine 1.30 (H) 0.50 - 1.05 mg/dL    eGFR 45 (L) >60 mL/min/1.73m*2    Calcium 9.1 8.6 - 10.3 mg/dL    Phosphorus 2.3 (L) 2.5 - 4.9 mg/dL    Albumin 3.2 (L) 3.4 - 5.0 g/dL   SST TOP   Result Value Ref Range    Extra Tube Hold for add-ons.    CBC   Result Value Ref Range    WBC 6.1 4.4 - 11.3 x10*3/uL    nRBC 1.6 (H) 0.0 - 0.0 /100 WBCs    RBC 3.04 (L) 4.00 - 5.20 x10*6/uL    Hemoglobin 9.8 (L) 12.0 - 16.0 g/dL    Hematocrit 31.6 (L) 36.0 - 46.0 %     (H) 80 - 100 fL    MCH 32.2 26.0 - 34.0 pg    MCHC 31.0 (L) 32.0 - 36.0 g/dL    RDW 15.4 (H) 11.5 - 14.5 %    Platelets 65 (L) 150 - 450 x10*3/uL   Renal Function Panel   Result Value Ref Range    Glucose 115 (H) 74 - 99 mg/dL    Sodium 147 (H) 136 - 145 mmol/L    Potassium  3.0 (L) 3.5 - 5.3 mmol/L    Chloride 113 (H) 98 - 107 mmol/L    Bicarbonate 26 21 - 32 mmol/L    Anion Gap 11 10 - 20 mmol/L    Urea Nitrogen 20 6 - 23 mg/dL    Creatinine 1.33 (H) 0.50 - 1.05 mg/dL    eGFR 44 (L) >60 mL/min/1.73m*2    Calcium 8.7 8.6 - 10.3 mg/dL    Phosphorus 2.8 2.5 - 4.9 mg/dL    Albumin 3.1 (L) 3.4 - 5.0 g/dL   Fibrinogen   Result Value Ref Range    Fibrinogen 185 (L) 200 - 400 mg/dL   Coagulation Screen   Result Value Ref Range    Protime 13.4 (H) 9.8 - 12.8 seconds    INR 1.2 (H) 0.9 - 1.1    aPTT 32 27 - 38 seconds   D-dimer, Non VTE   Result Value Ref Range    D-Dimer Non VTE, Quant (ng/mL FEU) 2,425 (H) <=500 ng/mL FEU   Hepatic function panel   Result Value Ref Range    Albumin 3.1 (L) 3.4 - 5.0 g/dL    Bilirubin, Total 1.1 0.0 - 1.2 mg/dL    Bilirubin, Direct 0.4 (H) 0.0 - 0.3 mg/dL    Alkaline Phosphatase 86 33 - 136 U/L    ALT 30 7 - 45 U/L    AST 48 (H) 9 - 39 U/L    Total Protein 6.5 6.4 - 8.2 g/dL   Iron and TIBC   Result Value Ref Range    Iron 103 35 - 150 ug/dL    UIBC 151 110 - 370 ug/dL    TIBC 254 240 - 445 ug/dL    % Saturation 41 25 - 45 %   Ferritin   Result Value Ref Range    Ferritin 472 (H) 8 - 150 ng/mL   Vitamin B12   Result Value Ref Range    Vitamin B12 873 211 - 911 pg/mL   Folate   Result Value Ref Range    Folate, Serum 10.7 >5.0 ng/mL                Assessment/Plan           Assessment & Plan  Intentional drug overdose, initial encounter (Multi)    ICD (implantable cardioverter-defibrillator) in place    Airway compromise    Polysubstance overdose    Toxic encephalopathy    Acute respiratory failure    Cardiac arrest    Pulmonary hypertension (Multi)    Severe tricuspid regurgitation by prior echocardiogram    S/P ICD (internal cardiac defibrillator) procedure    Cont to monitor renal function   Noted input from heme  Thrombocytopenia could be due to hep c and chirrhoiss  Will need follow up with hepatology   Vq intermediate prob  Recheck renal function and if  stable will get CT chets   Diuresis hold off at this time  Cont with current BP meds              Hamilton Escamilla MD

## 2025-02-18 NOTE — PROGRESS NOTES
Nutrition Follow-up Note    Chart reviewed and attempted to contact pt however no answer at time of attempt.  Per chart review:  2-11- pt on trophic enteral feeds.  2-12- pt liberated from vent, regular diet ordered.  2-14- cardiac diet ordered.  2-16- Pt moved to regular nursing floor.    Per RN today, pt with poor intake- nausea.    Discussed supplement for pt with RN.    Results for orders placed or performed during the hospital encounter of 02/10/25 (from the past 24 hours)   Renal Function Panel   Result Value Ref Range    Glucose 94 74 - 99 mg/dL    Sodium 146 (H) 136 - 145 mmol/L    Potassium 3.1 (L) 3.5 - 5.3 mmol/L    Chloride 113 (H) 98 - 107 mmol/L    Bicarbonate 24 21 - 32 mmol/L    Anion Gap 12 10 - 20 mmol/L    Urea Nitrogen 17 6 - 23 mg/dL    Creatinine 1.08 (H) 0.50 - 1.05 mg/dL    eGFR 56 (L) >60 mL/min/1.73m*2    Calcium 8.5 (L) 8.6 - 10.3 mg/dL    Phosphorus 2.3 (L) 2.5 - 4.9 mg/dL    Albumin 2.9 (L) 3.4 - 5.0 g/dL   Lactate Dehydrogenase   Result Value Ref Range     (H) 84 - 246 U/L   Reticulocytes   Result Value Ref Range    Retic % 4.2 (H) 0.5 - 2.0 %    Retic Absolute 0.118 (H) 0.017 - 0.110 x10*6/uL    Reticulocyte Hemoglobin 37 28 - 38 pg    Immature Retic fraction 28.4 (H) <=16.0 %   CBC and Auto Differential   Result Value Ref Range    WBC 6.5 4.4 - 11.3 x10*3/uL    nRBC 1.1 (H) 0.0 - 0.0 /100 WBCs    RBC 2.80 (L) 4.00 - 5.20 x10*6/uL    Hemoglobin 9.2 (L) 12.0 - 16.0 g/dL    Hematocrit 29.2 (L) 36.0 - 46.0 %     (H) 80 - 100 fL    MCH 32.9 26.0 - 34.0 pg    MCHC 31.5 (L) 32.0 - 36.0 g/dL    RDW 15.8 (H) 11.5 - 14.5 %    Platelets 64 (L) 150 - 450 x10*3/uL    Immature Granulocytes %, Automated 1.4 (H) 0.0 - 0.9 %    Immature Granulocytes Absolute, Automated 0.09 0.00 - 0.70 x10*3/uL   Manual Differential   Result Value Ref Range    Neutrophils %, Manual 86.0 40.0 - 80.0 %    Lymphocytes %, Manual 11.0 13.0 - 44.0 %    Monocytes %, Manual 3.0 2.0 - 10.0 %    Eosinophils %,  Manual 0.0 0.0 - 6.0 %    Basophils %, Manual 0.0 0.0 - 2.0 %    Seg Neutrophils Absolute, Manual 5.59 1.20 - 7.00 x10*3/uL    Lymphocytes Absolute, Manual 0.72 (L) 1.20 - 4.80 x10*3/uL    Monocytes Absolute, Manual 0.20 0.10 - 1.00 x10*3/uL    Eosinophils Absolute, Manual 0.00 0.00 - 0.70 x10*3/uL    Basophils Absolute, Manual 0.00 0.00 - 0.10 x10*3/uL    Total Cells Counted 100     RBC Morphology See Below     Polychromasia Mild     Teardrop Cells Few      Scheduled medications  ALPRAZolam, 0.25 mg, oral, Nightly  aspirin, 81 mg, oral, Daily  folic acid, 0.5 mg, oral, Daily  [Held by provider] gabapentin, 400 mg, oral, TID  heparin (porcine), 5,000 Units, subcutaneous, q8h TANK  lisinopril, 20 mg, oral, Daily   And  hydroCHLOROthiazide, 12.5 mg, oral, Daily  levothyroxine, 137 mcg, oral, Daily  lidocaine, 1 patch, transdermal, Daily  multivitamin with minerals, 1 tablet, oral, Daily  nicotine, 1 patch, transdermal, Daily  polyethylene glycol, 17 g, oral, Daily  potassium chloride CR, 40 mEq, oral, q4h  psyllium, 1 packet, oral, Daily  thiamine, 100 mg, oral, Daily      Continuous medications     PRN medications  PRN medications: acetaminophen **OR** acetaminophen, ALPRAZolam  Dietary Orders (From admission, onward)       Start     Ordered    02/18/25 1608  Oral nutritional supplements  Until discontinued        Question Answer Comment   Deliver with All meals    Select supplement: Ensure Plus High Protein        02/18/25 1607    02/14/25 2326  Adult diet Cardiac; 70 gm fat; 2 - 3 grams Sodium  Diet effective now        Question Answer Comment   Diet type Cardiac    Fat restriction: 70 gm fat    Sodium restriction: 2 - 3 grams Sodium        02/14/25 2325    02/10/25 2216  May Not Participate in Room Service  ( ROOM SERVICE MAY NOT PARTICIPATE)  Once        Question:  .  Answer:  Yes    02/10/25 2215                    History:  Energy Intake: Poor < 50 %  Food and Nutrient History: per RN report today & per  "flowsheets yesterday    Anthropometrics:  Height: 165.1 cm (5' 5\")  Weight: 90.7 kg (199 lb 14.4 oz)  BMI (Calculated): 33.27    Weight         2/12/2025  0700 2/13/2025  0614 2/15/2025  0600 2/16/2025  0600 2/18/2025  0600    Weight: 99.7 kg (219 lb 12.8 oz) 96.5 kg (212 lb 11.2 oz) 96.4 kg (212 lb 8.4 oz) 96.4 kg (212 lb 8.4 oz) 90.7 kg (199 lb 14.4 oz)              Weight History / % Weight Change: 94.6kg 1/29/25, 97.0kg 1/2/25, 96.6kg 8/13/24, 92.1kg 6/5/24  Significant Weight Loss: No (Current weight potentially inaccurate. Requested re weight.)    Energy Needs:  Height: 165.1 cm (5' 5\")  Weight Used for Equation Calculations: 96.4 kg (212 lb 8.4 oz) (2/16/2025 weight)  Minute Ventilation (L/min): 7.99 L/min  Geetha Betancur Equation (Overweight or Obese Patients): 1500  Equation Chosen to Use by RD: Mona Conner  Activity Factor: 1.3  Total Energy Needs: 1950  Temp: 37.2 °C (99 °F)    Total Protein Estimated Needs in 24 Hours (g): 75 g  Protein Estimated Needs per kg Body Weight in 24 Hours (g/kg): 95 g/kg  Method for Estimating 24 Hour Protein Needs: 0.8-1.0    Method for Estimating 24 Hour Fluid Needs: 1ml/kcal or per MD    Nutrition Focused Physical Findings:  Defer Subcutaneous Fat Loss Assessment: Defer all  Defer All Reason: RDN working remotely. Will attempt upon follow up.    Defer Muscle Wasting Assessment: Defer all  Defer All Reason: RDN working remotely. Will attempt upon follow up.    Edema: +2 mild  Edema Location: RUE, RLE & LLE non pitting; LUE    Skin: Negative  Digestive System Findings: Nausea     Nutrition Diagnosis   Patient has Nutrition Diagnosis: Yes  Nutrition Diagnosis 1: Inadequate oral intake  Diagnosis Status (1): Resolved  Related to (1): acute illness  As Evidenced by (1): pt NPO requiring enteral feeds to meet nutritional needs       Nutrition Diagnosis 2: Predicted inadequate energy intake  Diagnosis Status (2): New  Related to (2): acute illness  As Evidenced by (2): poor " intake per flowsheets and RN       Nutrition Interventions/Recommendations   Food and/or Nutrient Delivery Interventions  Goal: > 75% of meals consumed     Medical Food Supplement: Commercial beverage medical food supplement therapy  Goal: Ensure TID for encouraged intake    Collaboration and Referral of Nutrition Care: Collaboration by nutrition professional with other providers  Coordination of Care with Providers: Nursing    Education Documentation  No documentation found.      Nutrition Monitoring and Evaluation   Food and Nutrient Related History  Estimated Energy Intake: Energy intake greater or equal to 75% of estimated energy needs    Fluid Intake: Estimated fluid intake    Anthropometrics: Body Composition/Growth/Weight History  Body Weight: Body weight - Maintain stable weight, Body weight - Weight reduction from fluids, as needed    Biochemical Data, Medical Tests and Procedures  Electrolyte and Renal Panel: Other (Comment)  Criteria: as clinically indicated    Gastrointestinal Profile: Other (Comment)  Criteria: as clinically indicated    Glucose/Endocrine Profile: Other (Comment)  Criteria: as clinically indicated    Nutritional Anemia Profile: Other (Comment)  Criteria: as clinically indicated    Vitamin Profile: Other (Comment)  Criteria: as clinically indicated    Nutrition Focused Physical Findings  Digestive System Finding: Nausea  Criteria: Stool output, Urine volume, Overall appearance    Edema Finding: +2 Pitting edema (Non pitting)    Time Spent (min): 60 minutes  Last Date of Nutrition Visit: 02/18/25  Nutrition Follow-Up Needed?: Dietitian to reassess per policy  Follow up Comment: [facility] follow up

## 2025-02-18 NOTE — CARE PLAN
The patient's goals for the shift include to have good night rest     The clinical goals for the shift include pt will remain HDS, and mplize and go to the bathroom for improving incontinence    Over the shift, the patient did make progress toward the following goals. Barriers to progression include . Recommendations to address these barriers include .

## 2025-02-18 NOTE — PROGRESS NOTES
Rashmi Oreilly is a 69 y.o. female on day 8 of admission presenting with Intentional drug overdose, initial encounter (Multi).      Subjective   Pt seen on lfoor     She is admitted for drug overdose fentanyl, benzo and cocaine +ve  And did have cardiac arrest while in hospital resuscitated  Today shaikh not feel good  Per pt she has swelling   No chest pain   Breathing ok       Objective     Last Recorded Vitals  /88 (BP Location: Right arm, Patient Position: Lying)   Pulse 61   Temp 37.2 °C (99 °F) (Temporal)   Resp 20   Wt 90.7 kg (199 lb 14.4 oz)   SpO2 94%   Intake/Output last 3 Shifts:  No intake or output data in the 24 hours ending 02/18/25 1602      Admission Weight  Weight: 93.3 kg (205 lb 11 oz) (02/10/25 1510)    Daily Weight  02/18/25 : 90.7 kg (199 lb 14.4 oz)    Image Results  Vascular US upper extremity venous duplex left  Narrative: Interpreted By:  Jayden Clifford,   STUDY:  [facility] US UPPER EXTREMITY VENOUS DUPLEX LEFT;  2/16/2025 2:36 pm      INDICATION:  Signs/Symptoms:Edema.      ,R60.0 Localized edema      COMPARISON:  None.      ACCESSION NUMBER(S):  [id number]      ORDERING CLINICIAN:  FERNANDA DEWEY      TECHNIQUE:  Vascular ultrasound of the  left upper extremity was performed.  Evaluation was performed with grayscale, color, and spectral Doppler.  When possible, compression views of the evaluated veins was also  performed.      FINDINGS:  Evaluation of the visualized portions of the internal jugular,  subclavian, axillary, brachial, cephalic, and basilic veins was  performed.      Note is made of occlusive thrombus within the cephalic vein.  Partially occlusive thrombus also noted within the left basilic vein.      There is normal respiratory variation, normal compressibility, as  well as normal color doppler signal in the remaining visualized  vessels.      Impression: No sonographic evidence of deep venous thrombosis. Positive for  superficial thrombosis of the left cephalic and  basilic veins.      MACRO:  None.      Signed by: Jayden Clifford 2/17/2025 8:05 AM  Dictation workstation:   [id number]      Physical Exam    Obese  No distress  Appropirate to simple questions  Chest clear  CVS regular   Ext edema present on left upper and lower  Stasis changes present   Abdo soft nontende rbs active no masses  Cns alert appropriate     Relevant Results    Scheduled medications  ALPRAZolam, 0.25 mg, oral, Nightly  aspirin, 81 mg, oral, Daily  folic acid, 0.5 mg, oral, Daily  [Held by provider] gabapentin, 400 mg, oral, TID  heparin (porcine), 5,000 Units, subcutaneous, q8h TANK  lisinopril, 20 mg, oral, Daily   And  hydroCHLOROthiazide, 12.5 mg, oral, Daily  levothyroxine, 137 mcg, oral, Daily  lidocaine, 1 patch, transdermal, Daily  multivitamin with minerals, 1 tablet, oral, Daily  nicotine, 1 patch, transdermal, Daily  polyethylene glycol, 17 g, oral, Daily  psyllium, 1 packet, oral, Daily  thiamine, 100 mg, oral, Daily      Continuous medications     PRN medications  PRN medications: acetaminophen **OR** acetaminophen, ALPRAZolam  Results for orders placed or performed during the hospital encounter of 02/10/25 (from the past 96 hours)   Comprehensive Metabolic Panel   Result Value Ref Range    Glucose 93 74 - 99 mg/dL    Sodium 146 (H) 136 - 145 mmol/L    Potassium 3.5 3.5 - 5.3 mmol/L    Chloride 113 (H) 98 - 107 mmol/L    Bicarbonate 24 21 - 32 mmol/L    Anion Gap 13 10 - 20 mmol/L    Urea Nitrogen 23 6 - 23 mg/dL    Creatinine 1.32 (H) 0.50 - 1.05 mg/dL    eGFR 44 (L) >60 mL/min/1.73m*2    Calcium 8.5 (L) 8.6 - 10.3 mg/dL    Albumin 2.8 (L) 3.4 - 5.0 g/dL    Alkaline Phosphatase 88 33 - 136 U/L    Total Protein 5.9 (L) 6.4 - 8.2 g/dL    AST 35 9 - 39 U/L    Bilirubin, Total 1.8 (H) 0.0 - 1.2 mg/dL    ALT 26 7 - 45 U/L   Magnesium   Result Value Ref Range    Magnesium 1.75 1.60 - 2.40 mg/dL   CBC   Result Value Ref Range    WBC 8.3 4.4 - 11.3 x10*3/uL    nRBC 0.2 (H) 0.0 - 0.0 /100 WBCs    RBC  2.85 (L) 4.00 - 5.20 x10*6/uL    Hemoglobin 9.6 (L) 12.0 - 16.0 g/dL    Hematocrit 29.8 (L) 36.0 - 46.0 %     (H) 80 - 100 fL    MCH 33.7 26.0 - 34.0 pg    MCHC 32.2 32.0 - 36.0 g/dL    RDW 14.6 (H) 11.5 - 14.5 %    Platelets 65 (L) 150 - 450 x10*3/uL   POCT GLUCOSE   Result Value Ref Range    POCT Glucose 93 74 - 99 mg/dL   CBC   Result Value Ref Range    WBC 6.3 4.4 - 11.3 x10*3/uL    nRBC 1.3 (H) 0.0 - 0.0 /100 WBCs    RBC 2.90 (L) 4.00 - 5.20 x10*6/uL    Hemoglobin 9.7 (L) 12.0 - 16.0 g/dL    Hematocrit 30.0 (L) 36.0 - 46.0 %     (H) 80 - 100 fL    MCH 33.4 26.0 - 34.0 pg    MCHC 32.3 32.0 - 36.0 g/dL    RDW 15.4 (H) 11.5 - 14.5 %    Platelets 71 (L) 150 - 450 x10*3/uL   Renal Function Panel   Result Value Ref Range    Glucose 95 74 - 99 mg/dL    Sodium 145 136 - 145 mmol/L    Potassium 3.0 (L) 3.5 - 5.3 mmol/L    Chloride 111 (H) 98 - 107 mmol/L    Bicarbonate 26 21 - 32 mmol/L    Anion Gap 11 10 - 20 mmol/L    Urea Nitrogen 21 6 - 23 mg/dL    Creatinine 1.30 (H) 0.50 - 1.05 mg/dL    eGFR 45 (L) >60 mL/min/1.73m*2    Calcium 9.1 8.6 - 10.3 mg/dL    Phosphorus 2.3 (L) 2.5 - 4.9 mg/dL    Albumin 3.2 (L) 3.4 - 5.0 g/dL   SST TOP   Result Value Ref Range    Extra Tube Hold for add-ons.    CBC   Result Value Ref Range    WBC 6.1 4.4 - 11.3 x10*3/uL    nRBC 1.6 (H) 0.0 - 0.0 /100 WBCs    RBC 3.04 (L) 4.00 - 5.20 x10*6/uL    Hemoglobin 9.8 (L) 12.0 - 16.0 g/dL    Hematocrit 31.6 (L) 36.0 - 46.0 %     (H) 80 - 100 fL    MCH 32.2 26.0 - 34.0 pg    MCHC 31.0 (L) 32.0 - 36.0 g/dL    RDW 15.4 (H) 11.5 - 14.5 %    Platelets 65 (L) 150 - 450 x10*3/uL   Renal Function Panel   Result Value Ref Range    Glucose 115 (H) 74 - 99 mg/dL    Sodium 147 (H) 136 - 145 mmol/L    Potassium 3.0 (L) 3.5 - 5.3 mmol/L    Chloride 113 (H) 98 - 107 mmol/L    Bicarbonate 26 21 - 32 mmol/L    Anion Gap 11 10 - 20 mmol/L    Urea Nitrogen 20 6 - 23 mg/dL    Creatinine 1.33 (H) 0.50 - 1.05 mg/dL    eGFR 44 (L) >60  mL/min/1.73m*2    Calcium 8.7 8.6 - 10.3 mg/dL    Phosphorus 2.8 2.5 - 4.9 mg/dL    Albumin 3.1 (L) 3.4 - 5.0 g/dL   Fibrinogen   Result Value Ref Range    Fibrinogen 185 (L) 200 - 400 mg/dL   Coagulation Screen   Result Value Ref Range    Protime 13.4 (H) 9.8 - 12.8 seconds    INR 1.2 (H) 0.9 - 1.1    aPTT 32 27 - 38 seconds   D-dimer, Non VTE   Result Value Ref Range    D-Dimer Non VTE, Quant (ng/mL FEU) 2,425 (H) <=500 ng/mL FEU   Hepatic function panel   Result Value Ref Range    Albumin 3.1 (L) 3.4 - 5.0 g/dL    Bilirubin, Total 1.1 0.0 - 1.2 mg/dL    Bilirubin, Direct 0.4 (H) 0.0 - 0.3 mg/dL    Alkaline Phosphatase 86 33 - 136 U/L    ALT 30 7 - 45 U/L    AST 48 (H) 9 - 39 U/L    Total Protein 6.5 6.4 - 8.2 g/dL   Iron and TIBC   Result Value Ref Range    Iron 103 35 - 150 ug/dL    UIBC 151 110 - 370 ug/dL    TIBC 254 240 - 445 ug/dL    % Saturation 41 25 - 45 %   Ferritin   Result Value Ref Range    Ferritin 472 (H) 8 - 150 ng/mL   Vitamin B12   Result Value Ref Range    Vitamin B12 873 211 - 911 pg/mL   Folate   Result Value Ref Range    Folate, Serum 10.7 >5.0 ng/mL   Renal Function Panel   Result Value Ref Range    Glucose 94 74 - 99 mg/dL    Sodium 146 (H) 136 - 145 mmol/L    Potassium 3.1 (L) 3.5 - 5.3 mmol/L    Chloride 113 (H) 98 - 107 mmol/L    Bicarbonate 24 21 - 32 mmol/L    Anion Gap 12 10 - 20 mmol/L    Urea Nitrogen 17 6 - 23 mg/dL    Creatinine 1.08 (H) 0.50 - 1.05 mg/dL    eGFR 56 (L) >60 mL/min/1.73m*2    Calcium 8.5 (L) 8.6 - 10.3 mg/dL    Phosphorus 2.3 (L) 2.5 - 4.9 mg/dL    Albumin 2.9 (L) 3.4 - 5.0 g/dL   Lactate Dehydrogenase   Result Value Ref Range     (H) 84 - 246 U/L   Reticulocytes   Result Value Ref Range    Retic % 4.2 (H) 0.5 - 2.0 %    Retic Absolute 0.118 (H) 0.017 - 0.110 x10*6/uL    Reticulocyte Hemoglobin 37 28 - 38 pg    Immature Retic fraction 28.4 (H) <=16.0 %   CBC and Auto Differential   Result Value Ref Range    WBC 6.5 4.4 - 11.3 x10*3/uL    nRBC 1.1 (H) 0.0 -  0.0 /100 WBCs    RBC 2.80 (L) 4.00 - 5.20 x10*6/uL    Hemoglobin 9.2 (L) 12.0 - 16.0 g/dL    Hematocrit 29.2 (L) 36.0 - 46.0 %     (H) 80 - 100 fL    MCH 32.9 26.0 - 34.0 pg    MCHC 31.5 (L) 32.0 - 36.0 g/dL    RDW 15.8 (H) 11.5 - 14.5 %    Platelets 64 (L) 150 - 450 x10*3/uL    Immature Granulocytes %, Automated 1.4 (H) 0.0 - 0.9 %    Immature Granulocytes Absolute, Automated 0.09 0.00 - 0.70 x10*3/uL   Manual Differential   Result Value Ref Range    Neutrophils %, Manual 86.0 40.0 - 80.0 %    Lymphocytes %, Manual 11.0 13.0 - 44.0 %    Monocytes %, Manual 3.0 2.0 - 10.0 %    Eosinophils %, Manual 0.0 0.0 - 6.0 %    Basophils %, Manual 0.0 0.0 - 2.0 %    Seg Neutrophils Absolute, Manual 5.59 1.20 - 7.00 x10*3/uL    Lymphocytes Absolute, Manual 0.72 (L) 1.20 - 4.80 x10*3/uL    Monocytes Absolute, Manual 0.20 0.10 - 1.00 x10*3/uL    Eosinophils Absolute, Manual 0.00 0.00 - 0.70 x10*3/uL    Basophils Absolute, Manual 0.00 0.00 - 0.10 x10*3/uL    Total Cells Counted 100     RBC Morphology See Below     Polychromasia Mild     Teardrop Cells Few                 Assessment/Plan           Assessment & Plan  Intentional drug overdose, initial encounter (Multi)    ICD (implantable cardioverter-defibrillator) in place    Airway compromise    Polysubstance overdose    Toxic encephalopathy    Acute respiratory failure    Cardiac arrest    Pulmonary hypertension (Multi)    Severe tricuspid regurgitation by prior echocardiogram    S/P ICD (internal cardiac defibrillator) procedure    Cont to monitor renal function   Noted input from heme  Thrombocytopenia could be due to hep c and chirrhoiss  Will need follow up with hepatology   Vq intermediate prob  Recheck renal function and if stable will get CT chets   Lasix 40 iv x 1  Consult renal     Cont with current BP meds                Hamilton Escamilla MD

## 2025-02-19 NOTE — NURSING NOTE
"Good evening, I have Ms. Rashmi Oreilly that was admitted to room 506, the patient's  came in to visit and asked if he could push the patient around in a wheelchair, the patient and  was instructed that he can wheel her around on the unit only and that she could not leave the unit, however I caught the  attempting to get on the elevator and I explained again that the patient could not leave the floor, the patient's  then got a little agressive with me and asked \"why not\" and I explained to them both again why she could not leave the floor and they both agreed not to, however after turning my back and returning to the unit, they left the floor and went down to the main entrance, I called security and they were able to see them on camera and sent security to them, after talking to security, the patient decided to go AMA, all of this took place in the lobby by the main entrance so myself or the charge nurse didn't have time to call Dr. Escamilla, the AMA paperwork was read and explained to the patient, the patient signed the paperwork, IV and telemetry was removed and the patient left with her  and security. The patient was admitted for accidental drug overdosed, who failed narcan, she had 2 rounds of CPR and 1 shock, she was intubated and extubated, looks like psych signed off yesterday.     2 mins  XIOMARA Lovell, APRN-CNP    "

## 2025-02-27 ENCOUNTER — TELEMEDICINE (OUTPATIENT)
Dept: PRIMARY CARE | Facility: HOSPITAL | Age: 70
End: 2025-02-27
Payer: MEDICARE

## 2025-02-27 DIAGNOSIS — Z00.00 HEALTH CARE MAINTENANCE: Primary | ICD-10-CM

## 2025-02-27 DIAGNOSIS — R79.9 ABNORMAL FINDING OF BLOOD CHEMISTRY, UNSPECIFIED: ICD-10-CM

## 2025-02-27 DIAGNOSIS — Z01.89 PHYSICAL THERAPY EVALUATION, INITIAL: ICD-10-CM

## 2025-02-27 NOTE — PROGRESS NOTES
Subjective   Patient ID: Rashmi Oreilly is a 69 y.o. female who presents to [facility]. She is switching care from [facility]  She is mostly here because she was told that if  has very good physical therapy.  She has a longstanding relationship with her PCP at [facility]. Patient presented today via Virtual Visit d/t to her inability to move much.     Patient reports that she left AMA, after not being seen by physician for a few days post transfer to the floor from the ICU.  Since being at home patient has reported decreased mobility due to worsened physical strength, and pain in her hips and knees (chronic for her).  She also endorses lower extremities swelling that began in the ICU that have persisted posthospitalization.  While in the hospital patient was being worked up for KT last creatinine 1.03.  Her KT prevented her from getting a CTPE, and per chart review it does not seem like she was discharged with   a diuretic due to her AMA status. Currently denies SOB, CP, Abd pian. Endorsed weakness, pain in joints.     She expressed if a service like home health aide will be to come see her at her current living location.  Patient currently lives in extended stay hotel in [facility]  She expressed not wanting to live in her current home right now due to caregiver burden with her mom.  I communicated to patient that it is unclear if home health aide will be able to accommodate her in her current living space, which is essentially a hotel.  I told the patient   the order will be placed anyway with information about her current living situation.  Spouse had expressed that he was currently looking for a place that they can moved to a next few weeks.   The home health was placed with lab draws and physical therapy needs.  The patient was told that if she does not hear back from the home health services by 3/4/2025, she should represent to the hospital for readmission for possible SNF placement.    Based on patient's clinical status and physical health skilled nursing facility would be the best optimal place for her recovery.      Hospital Course of Recent Hospitalization   Rashmi Oreilly is a 69 y.o. female with past medical history significant for Cardiac arrest secondary to ventricular fibrillation  in setting of electrolyte abnormalities  s/p  [facility] placement 4/1/2010,  Hypertension, Hyperlipidemia,  Chronic hepatitis C with cirrhosis, Hypothyroidism s/p thyroidectomy for papillary thyroid cancer, Anxiety/depression, Panic disorder, Lyme disease 1995, Osteoarthritis s/p total knee arthroplasty, Drug and alcohol use. Presented with mental status change/overdose. Cardiology is consulted for AICD interrogation s/p brief cardiac arrest in ICU. Hx of cardiac arrest in 2010.    Home CV meds: aspirin 81 mg daily, Lisinopril-hydrochlorothiazide 20-12.5 mg daily      02-14-25 LHC and RHC that showed No significant CAD with minimal luminal irregularities in a right dominant system. Elevated left and right heart filling pressures. Mild pulmonary HTN. Preserved cardiac index and normal SVR. No evidence of intracardiac shunt. No evidence of aortic stenosis by gradient.     #Cardiac arrest   -While in the ICU, she experienced cardiac arrest with documented bradycardia, pulselessness, and VT, requiring CPR, shock x 2,  epinephrine, bicarbonate and amiodarone, with subsequent ROSC   -Per documentation, her AICD was NABEEL in 2018. Also with evidence of ICD lead fracture.  AICD was initially placed 2010 after VF  arrest in setting of electrolyte abnormalities.  No documentation of generator change out.   -Device was unable to be interrogated 2/12/2024 as it was EOL  -Echocardiogram 2/11/2025 showed LVEF 60-65% no wall motion abnormalities right ventricle was severely enlarged with normal RV function moderate aortic stenosis and severe tricuspid regurgitation no pericardial effusion.      Patient was subsequently  transferred to the floor.  She does have renal insufficiency and intermediate probability of the VQ scan.  CT scan of the chest pending.  Patient was seen by psychiatry and there was no need for inpatient psych admission.  It was felt that the patient had accidental overdose.     While her workup was pending patient left the hospital AGAINST MEDICAL ADVICE            PMH  - Panic disorder, with frequent panic attacks    - Cardiac arrest with ventricular fibrillation 2/2 AICD implantation (2008); [facility] clinical summary note generated 11/1/23 notes AICD may no longer be functional due to fractured lead   - Chronic HCV  - Hypertension - patient does not check her BP at home  - Hypothyroidism 2/2 thyroidectomy for malignant neoplasm of thyroid  - Lyme Disease, 1995 (treated)  - Sciatica 2/2 spondylosis, occurs in either leg        Also with reported history of hepatitis C, she says she has never received treatment for this.  She is not very interested, says she has talked to her [facility] group and she believes that unless she has actual symptoms does any treatment.    PSH  Bilateral hip arthroplasty  Total knee arthroplasty (left)    Review of Systems  No fevers no chills, no weight changes.    No URI symptoms    No cough no shortness of breath    No chest pain no palpitations    Appetite intact, no abdominal pain.    No new or unusual headaches.        Objective   There were no vitals taken for this visit.    Physical Exam this visit was virtual so no physical exam was done      Assessment/Plan   Rashmi Oreilly is a 69 y.o. female with past medical history significant for Cardiac arrest secondary to ventricular fibrillation in setting of electrolyte abnormalities s/p [facility] placement 4/1/2010, Hypertension, Hyperlipidemia, Chronic hepatitis C with cirrhosis, Hypothyroidism s/p thyroidectomy for papillary thyroid cancer, Anxiety/depression, Panic disorder, Lyme disease 1995, Osteoarthritis s/p total knee  arthroplasty, Drug and alcohol use. Presented with mental status change/overdose. Cardiology is consulted for AICD interrogation s/p brief cardiac arrest in ICU. She has a longstanding relationship with her PCP at [facility]. Patient presented today via Virtual Visit d/t to her inability to move much.       #Post Hospitalization F/Up  #Poor Physical Recovery  #Post CardiacArrest   #KT   :: look at [facility] for history  Plan:  -The home health was placed with lab draws and physical therapy needs.  The patient was told that if she does not hear back from the home health services by 3/4/2025, she should represent to the hospital for readmission for possible SNF placement.   Based on patient's clinical status and physical health skilled nursing facility would be the best optimal place for her recovery.      Issues not address during this Visit.     Chronic hip pain: Referral to physical therapy.    Chronic hepatitis C: Does not appear to be treated, she is very hesitant, strongly encouraged her to follow-up with her primary PCP at [facility] to get tested, I do not see any viral load in the past.  Based on exam no concern for cirrhosis.  She also had recent ultrasound of liver that was essentially normal.    Chronic anxiety: Had a long discussion with patient, explained to her that alprazolam is not really treatment of choice for chronic anxiety, especially with history of alcohol abuse, patient understands but says that this is the only that works for her, I have encouraged her to follow-up with her regular PCP at [facility], patient understands that I will not be giving her alprazolam.         Patient was staffed with Dr. Ivan Tiwari MD  PGY2 - IM

## 2025-03-01 ENCOUNTER — TELEPHONE (OUTPATIENT)
Dept: HOME HEALTH SERVICES | Facility: HOME HEALTH | Age: 70
End: 2025-03-01
Payer: MEDICARE

## 2025-03-01 NOTE — TELEPHONE ENCOUNTER
"Thank you for the referral on this patient.  There are two things that need to be addressed.  The Telemedicine visit needs to be identified if it was audio or audio visual . Additionally the labs put in to  be drawn need the following with the new [facility] provider     [facility] noted that lab orders need to be entered on this patient. Please confirm your order has “Resulting Agency: [facility]”. To ensure results are sent to the correct provider: Provider  needs to go to the “Orders” tab, then \"Future Outpatient”, then click \"Providers” under the  section and change the AUTHORIZING provider to who should be receiving the lab result. Thank you.    We will be able to review with this additional information   "

## 2025-03-03 ENCOUNTER — HOME HEALTH ADMISSION (OUTPATIENT)
Dept: HOME HEALTH SERVICES | Facility: HOME HEALTH | Age: 70
End: 2025-03-03
Payer: COMMERCIAL

## 2025-03-03 ENCOUNTER — DOCUMENTATION (OUTPATIENT)
Dept: HOME HEALTH SERVICES | Facility: HOME HEALTH | Age: 70
End: 2025-03-03
Payer: COMMERCIAL

## 2025-03-03 NOTE — HH CARE COORDINATION
Home Care received a Referral for Nursing, Physical Therapy, Occupational Therapy, and Home Health Aide. We have processed the referral for a Start of Care on 3/6/25.     If you have any questions or concerns, please feel free to contact us at [phone]. Follow the prompts, enter your five digit zip code, and you will be directed to your care team on [facility].

## 2025-03-04 ENCOUNTER — DOCUMENTATION (OUTPATIENT)
Dept: HOME HEALTH SERVICES | Facility: HOME HEALTH | Age: 70
End: 2025-03-04
Payer: COMMERCIAL

## 2025-03-04 NOTE — HH CARE COORDINATION
Home Care received a referral for Nursing, Physical Therapy, Occupational Therapy, and Home Health Aide. Unfortunately, we are unable to accept and process the referral at this time.    Reason:  Past behavior or safety concerns from a previous admission    Patients, please reach out to the referring provider or your PCP to assist in obtaining an alternative home care agency and/or guidance to meet your needs.    Providers, please reach out to  Home Care at [phone] with any questions regarding the declined referral.

## 2025-03-05 ENCOUNTER — TELEPHONE (OUTPATIENT)
Dept: PRIMARY CARE | Facility: HOSPITAL | Age: 70
End: 2025-03-05

## 2025-03-05 ENCOUNTER — DOCUMENTATION (OUTPATIENT)
Dept: PRIMARY CARE | Facility: HOSPITAL | Age: 70
End: 2025-03-05
Payer: COMMERCIAL

## 2025-03-05 NOTE — PROGRESS NOTES
I attempted to reach the Mrs. Oreilly to inform her that  Home Care received the referral for Nursing, Physical Therapy, Occupational Therapy, and Home Health Aide. Unfortunately,they are unable to accept and process the referral at this time. However, patient did not answer and I was not able to leave message due to mail box not being set up. Patient needs to call insurance company to find an agency that is in network and is willing to accept her.

## 2025-03-05 NOTE — TELEPHONE ENCOUNTER
Hello. This Patient I saw last week on the virtual visit. I tried to do a Home Care Order for her but she got denied due to her positive drug screen. Can you call patient to let them know of the Home Care Agency decision. Since patient can't be evaluated at her current living space. She needs to be advised to represent to the ED so that she can be set up for SNF.     This was per Dr. Tiwari.

## 2025-07-28 ENCOUNTER — TELEPHONE (OUTPATIENT)
Dept: PRIMARY CARE | Facility: CLINIC | Age: 70
End: 2025-07-28
Payer: COMMERCIAL

## 2025-07-28 NOTE — TELEPHONE ENCOUNTER
Pt left VM for refills but is not a patient here at our practice. Unable to leave her a VM to tell her she needs to call her PCP.

## 2025-08-05 ENCOUNTER — TELEPHONE (OUTPATIENT)
Dept: PRIMARY CARE | Facility: CLINIC | Age: 70
End: 2025-08-05
Payer: COMMERCIAL

## (undated) DEVICE — INTRODUCER, GLIDESHEATH SLENDER A-KIT, 5FR 10CM

## (undated) DEVICE — GUIDEWIRE, HI-TORQUE, VERSACORE, 145CM, FLOPPY

## (undated) DEVICE — TR BAND, RADIAL COMPRESSION, STANDARD, 24CM

## (undated) DEVICE — GUIDEWIRE, INQWIRE, 3MM J, .035, 260

## (undated) DEVICE — CATHETER, DIAGNOSTIC, 4 FR-JR 4

## (undated) DEVICE — GUIDEWIRE, STRAIGHT, HI-TORQUE BALANCE MIDDLEWEIGHT, 0.014 IN X 190 CM, HYDROCOAT

## (undated) DEVICE — CATHETER, DIAGNOSTIC, 4 FR-JL 3.5

## (undated) DEVICE — CATHETER, WEDGE PRESSURE, BALLOON, DOUBLE LUMEN, 5 FR, 110 CM